# Patient Record
Sex: MALE | Race: BLACK OR AFRICAN AMERICAN | NOT HISPANIC OR LATINO | Employment: OTHER | URBAN - METROPOLITAN AREA
[De-identification: names, ages, dates, MRNs, and addresses within clinical notes are randomized per-mention and may not be internally consistent; named-entity substitution may affect disease eponyms.]

---

## 2014-05-28 LAB — CRC RECOMMENDATION EXT: NORMAL

## 2017-05-18 ENCOUNTER — HISTORICAL (OUTPATIENT)
Dept: INTERNAL MEDICINE | Facility: CLINIC | Age: 52
End: 2017-05-18

## 2017-05-18 LAB
ABS NEUT (OLG): 2.26 X10(3)/MCL (ref 2.1–9.2)
ALBUMIN SERPL-MCNC: 4.2 GM/DL (ref 3.4–5)
ALBUMIN/GLOB SERPL: 1 RATIO (ref 1–2)
ALP SERPL-CCNC: 83 UNIT/L (ref 20–120)
ALT SERPL-CCNC: 30 UNIT/L
APPEARANCE, UA: CLEAR
AST SERPL-CCNC: 20 UNIT/L
BACTERIA #/AREA URNS AUTO: ABNORMAL /[HPF]
BASOPHILS # BLD AUTO: 0.02 X10(3)/MCL
BASOPHILS NFR BLD AUTO: 0 % (ref 0–1)
BILIRUB SERPL-MCNC: 0.6 MG/DL
BILIRUB UR QL STRIP: NEGATIVE
BILIRUBIN DIRECT+TOT PNL SERPL-MCNC: <0.1 MG/DL
BILIRUBIN DIRECT+TOT PNL SERPL-MCNC: >0.5 MG/DL
BUN SERPL-MCNC: 16 MG/DL (ref 7–25)
CALCIUM SERPL-MCNC: 9.1 MG/DL (ref 8.4–10.3)
CHLORIDE SERPL-SCNC: 108 MMOL/L (ref 96–110)
CHOLEST SERPL-MCNC: 166 MG/DL
CHOLEST/HDLC SERPL: 5 {RATIO} (ref 0–5)
CO2 SERPL-SCNC: 27 MMOL/L (ref 24–32)
COLOR UR: ABNORMAL
CREAT SERPL-MCNC: 0.73 MG/DL (ref 0.7–1.4)
EOSINOPHIL # BLD AUTO: 0.04 10*3/UL
EOSINOPHIL NFR BLD AUTO: 1 % (ref 0–5)
ERYTHROCYTE [DISTWIDTH] IN BLOOD BY AUTOMATED COUNT: 14.7 % (ref 11.5–14.5)
EST. AVERAGE GLUCOSE BLD GHB EST-MCNC: 123 MG/DL
GLOBULIN SER-MCNC: 3.1 GM/ML (ref 2.3–3.5)
GLUCOSE (UA): NORMAL
GLUCOSE SERPL-MCNC: 107 MG/DL (ref 65–99)
HAV IGM SERPL QL IA: NONREACTIVE
HBA1C MFR BLD: 5.9 % (ref 4.7–5.6)
HBV CORE IGM SERPL QL IA: NONREACTIVE
HBV SURFACE AG SERPL QL IA: NEGATIVE
HCT VFR BLD AUTO: 41.4 % (ref 40–51)
HCV AB SERPL QL IA: NONREACTIVE
HDLC SERPL-MCNC: 33 MG/DL
HGB BLD-MCNC: 13 GM/DL (ref 13.5–17.5)
HGB UR QL STRIP: 0.06 MG/DL
HIV 1+2 AB+HIV1 P24 AG SERPL QL IA: NONREACTIVE
HYALINE CASTS #/AREA URNS LPF: ABNORMAL /[LPF]
KETONES UR QL STRIP: NEGATIVE
LDLC SERPL CALC-MCNC: 111 MG/DL (ref 0–130)
LEUKOCYTE ESTERASE UR QL STRIP: NEGATIVE
LYMPHOCYTES # BLD AUTO: 2.45 X10(3)/MCL
LYMPHOCYTES NFR BLD AUTO: 48 % (ref 15–40)
MCH RBC QN AUTO: 23.3 PG (ref 26–34)
MCHC RBC AUTO-ENTMCNC: 31.4 GM/DL (ref 31–37)
MCV RBC AUTO: 74.1 FL (ref 80–100)
MONOCYTES # BLD AUTO: 0.38 X10(3)/MCL
MONOCYTES NFR BLD AUTO: 7 % (ref 4–12)
NEUTROPHILS # BLD AUTO: 2.26 X10(3)/MCL
NEUTROPHILS NFR BLD AUTO: 44 X10(3)/MCL
NITRITE UR QL STRIP: NEGATIVE
PH UR STRIP: 5.5 [PH] (ref 4.5–8)
PLATELET # BLD AUTO: 269 X10(3)/MCL (ref 130–400)
PMV BLD AUTO: 10.8 FL (ref 7.4–10.4)
POTASSIUM SERPL-SCNC: 3.8 MMOL/L (ref 3.6–5.2)
PROT SERPL-MCNC: 7.3 GM/DL (ref 6–8)
PROT UR QL STRIP: NEGATIVE
PSA SERPL-MCNC: 1.5 NG/ML (ref 0–4)
RBC # BLD AUTO: 5.59 X10(6)/MCL (ref 4.5–5.9)
RBC #/AREA URNS AUTO: ABNORMAL /[HPF]
SODIUM SERPL-SCNC: 143 MMOL/L (ref 135–146)
SP GR UR STRIP: 1.02 (ref 1–1.03)
SQUAMOUS #/AREA URNS LPF: ABNORMAL /[LPF]
TRIGL SERPL-MCNC: 108 MG/DL
TSH SERPL-ACNC: 2.76 MIU/L (ref 0.5–5)
UROBILINOGEN UR STRIP-ACNC: NORMAL
VLDLC SERPL CALC-MCNC: 22 MG/DL
WBC # SPEC AUTO: 5.2 X10(3)/MCL (ref 4.5–11)
WBC #/AREA URNS AUTO: ABNORMAL /HPF

## 2017-11-06 ENCOUNTER — HISTORICAL (OUTPATIENT)
Dept: INTERNAL MEDICINE | Facility: CLINIC | Age: 52
End: 2017-11-06

## 2017-11-06 LAB
EST. AVERAGE GLUCOSE BLD GHB EST-MCNC: 128 MG/DL
HBA1C MFR BLD: 6.1 % (ref 4.2–6.3)

## 2018-06-18 ENCOUNTER — HISTORICAL (OUTPATIENT)
Dept: RADIOLOGY | Facility: HOSPITAL | Age: 53
End: 2018-06-18

## 2018-06-18 LAB
ABS NEUT (OLG): 2.12 X10(3)/MCL (ref 2.1–9.2)
ALBUMIN SERPL-MCNC: 3.7 GM/DL (ref 3.4–5)
ALBUMIN/GLOB SERPL: 1 RATIO (ref 1–2)
ALP SERPL-CCNC: 87 UNIT/L (ref 45–117)
ALT SERPL-CCNC: 42 UNIT/L (ref 12–78)
APPEARANCE, UA: CLEAR
AST SERPL-CCNC: 21 UNIT/L (ref 15–37)
BACTERIA #/AREA URNS AUTO: ABNORMAL /[HPF]
BASOPHILS # BLD AUTO: 0.02 X10(3)/MCL
BASOPHILS NFR BLD AUTO: 0 %
BILIRUB SERPL-MCNC: 0.3 MG/DL (ref 0.2–1)
BILIRUB UR QL STRIP: NEGATIVE
BILIRUBIN DIRECT+TOT PNL SERPL-MCNC: <0.1 MG/DL
BILIRUBIN DIRECT+TOT PNL SERPL-MCNC: >0.2 MG/DL
BUN SERPL-MCNC: 19 MG/DL (ref 7–18)
CALCIUM SERPL-MCNC: 8.4 MG/DL (ref 8.5–10.1)
CHLORIDE SERPL-SCNC: 110 MMOL/L (ref 98–107)
CHOLEST SERPL-MCNC: 134 MG/DL
CHOLEST/HDLC SERPL: 3.7 {RATIO} (ref 0–5)
CO2 SERPL-SCNC: 26 MMOL/L (ref 21–32)
COLOR UR: ABNORMAL
CREAT SERPL-MCNC: 0.8 MG/DL (ref 0.6–1.3)
EOSINOPHIL # BLD AUTO: 0.05 10*3/UL
EOSINOPHIL NFR BLD AUTO: 1 %
ERYTHROCYTE [DISTWIDTH] IN BLOOD BY AUTOMATED COUNT: 14.8 % (ref 11.5–14.5)
EST. AVERAGE GLUCOSE BLD GHB EST-MCNC: 128 MG/DL
GLOBULIN SER-MCNC: 3.6 GM/ML (ref 2.3–3.5)
GLUCOSE (UA): NORMAL
GLUCOSE SERPL-MCNC: 86 MG/DL (ref 74–106)
HAV IGM SERPL QL IA: NONREACTIVE
HBA1C MFR BLD: 6.1 % (ref 4.2–6.3)
HBV CORE IGM SERPL QL IA: NONREACTIVE
HBV SURFACE AG SERPL QL IA: NEGATIVE
HCT VFR BLD AUTO: 39.2 % (ref 40–51)
HCV AB SERPL QL IA: NONREACTIVE
HDLC SERPL-MCNC: 36 MG/DL
HGB BLD-MCNC: 12.2 GM/DL (ref 13.5–17.5)
HGB UR QL STRIP: 0.03 MG/DL
HIV 1+2 AB+HIV1 P24 AG SERPL QL IA: NONREACTIVE
HYALINE CASTS #/AREA URNS LPF: ABNORMAL /[LPF]
IMM GRANULOCYTES # BLD AUTO: 0.01 10*3/UL
IMM GRANULOCYTES NFR BLD AUTO: 0 %
KETONES UR QL STRIP: NEGATIVE
LDLC SERPL CALC-MCNC: 81 MG/DL (ref 0–130)
LEUKOCYTE ESTERASE UR QL STRIP: NEGATIVE
LYMPHOCYTES # BLD AUTO: 2.24 X10(3)/MCL
LYMPHOCYTES NFR BLD AUTO: 46 % (ref 13–40)
MCH RBC QN AUTO: 23.8 PG (ref 26–34)
MCHC RBC AUTO-ENTMCNC: 31.1 GM/DL (ref 31–37)
MCV RBC AUTO: 76.6 FL (ref 80–100)
MONOCYTES # BLD AUTO: 0.42 X10(3)/MCL
MONOCYTES NFR BLD AUTO: 9 % (ref 4–12)
NEUTROPHILS # BLD AUTO: 2.12 X10(3)/MCL
NEUTROPHILS NFR BLD AUTO: 44 X10(3)/MCL
NITRITE UR QL STRIP: NEGATIVE
PH UR STRIP: 6.5 [PH] (ref 4.5–8)
PLATELET # BLD AUTO: 237 X10(3)/MCL (ref 130–400)
PMV BLD AUTO: 10.4 FL (ref 7.4–10.4)
POTASSIUM SERPL-SCNC: 4.2 MMOL/L (ref 3.5–5.1)
PROT SERPL-MCNC: 7.3 GM/DL (ref 6.4–8.2)
PROT UR QL STRIP: NEGATIVE
PSA SERPL-MCNC: 1.8 NG/ML
RBC # BLD AUTO: 5.12 X10(6)/MCL (ref 4.5–5.9)
RBC #/AREA URNS AUTO: ABNORMAL /[HPF]
SODIUM SERPL-SCNC: 144 MMOL/L (ref 136–145)
SP GR UR STRIP: 1.02 (ref 1–1.03)
SQUAMOUS #/AREA URNS LPF: ABNORMAL /[LPF]
TRIGL SERPL-MCNC: 84 MG/DL
TSH SERPL-ACNC: 1.03 MIU/L (ref 0.36–3.74)
UROBILINOGEN UR STRIP-ACNC: NORMAL
VLDLC SERPL CALC-MCNC: 17 MG/DL
WBC # SPEC AUTO: 4.9 X10(3)/MCL (ref 4.5–11)
WBC #/AREA URNS AUTO: ABNORMAL /HPF

## 2018-06-20 LAB — FINAL CULTURE: NO GROWTH

## 2018-09-14 ENCOUNTER — HISTORICAL (OUTPATIENT)
Dept: INTERNAL MEDICINE | Facility: CLINIC | Age: 53
End: 2018-09-14

## 2018-09-14 LAB
ABS NEUT (OLG): 2.58 X10(3)/MCL (ref 2.1–9.2)
BASOPHILS # BLD AUTO: 0.02 X10(3)/MCL
BASOPHILS NFR BLD AUTO: 0 %
EOSINOPHIL # BLD AUTO: 0.04 X10(3)/MCL
EOSINOPHIL NFR BLD AUTO: 1 %
ERYTHROCYTE [DISTWIDTH] IN BLOOD BY AUTOMATED COUNT: 14.9 % (ref 11.5–14.5)
HCT VFR BLD AUTO: 40.4 % (ref 40–51)
HGB BLD-MCNC: 12.6 GM/DL (ref 13.5–17.5)
IMM GRANULOCYTES # BLD AUTO: 0.01 10*3/UL
IMM GRANULOCYTES NFR BLD AUTO: 0 %
LYMPHOCYTES # BLD AUTO: 2.55 X10(3)/MCL
LYMPHOCYTES NFR BLD AUTO: 45 % (ref 13–40)
MCH RBC QN AUTO: 23.5 PG (ref 26–34)
MCHC RBC AUTO-ENTMCNC: 31.2 GM/DL (ref 31–37)
MCV RBC AUTO: 75.2 FL (ref 80–100)
MONOCYTES # BLD AUTO: 0.44 X10(3)/MCL
MONOCYTES NFR BLD AUTO: 8 % (ref 4–12)
NEUTROPHILS # BLD AUTO: 2.58 X10(3)/MCL
NEUTROPHILS NFR BLD AUTO: 46 X10(3)/MCL
PLATELET # BLD AUTO: 246 X10(3)/MCL (ref 130–400)
PMV BLD AUTO: 10.5 FL (ref 7.4–10.4)
RBC # BLD AUTO: 5.37 X10(6)/MCL (ref 4.5–5.9)
WBC # SPEC AUTO: 5.6 X10(3)/MCL (ref 4.5–11)

## 2019-01-21 ENCOUNTER — HISTORICAL (OUTPATIENT)
Dept: INTERNAL MEDICINE | Facility: CLINIC | Age: 54
End: 2019-01-21

## 2019-01-21 LAB
ABS NEUT (OLG): 1.98 X10(3)/MCL (ref 2.1–9.2)
APPEARANCE, UA: CLEAR
BACTERIA #/AREA URNS AUTO: ABNORMAL /[HPF]
BASOPHILS # BLD AUTO: 0.02 X10(3)/MCL
BASOPHILS NFR BLD AUTO: 0 %
BILIRUB UR QL STRIP: NEGATIVE
COLOR UR: ABNORMAL
EOSINOPHIL # BLD AUTO: 0.05 10*3/UL
EOSINOPHIL NFR BLD AUTO: 1 %
ERYTHROCYTE [DISTWIDTH] IN BLOOD BY AUTOMATED COUNT: 14.6 % (ref 11.5–14.5)
EST. AVERAGE GLUCOSE BLD GHB EST-MCNC: 128 MG/DL
GLUCOSE (UA): NORMAL
HBA1C MFR BLD: 6.1 % (ref 4.2–6.3)
HCT VFR BLD AUTO: 41.7 % (ref 40–51)
HGB BLD-MCNC: 12.8 GM/DL (ref 13.5–17.5)
HGB UR QL STRIP: 0.06 MG/DL
HYALINE CASTS #/AREA URNS LPF: ABNORMAL /[LPF]
IMM GRANULOCYTES # BLD AUTO: 0.01 10*3/UL
IMM GRANULOCYTES NFR BLD AUTO: 0 %
KETONES UR QL STRIP: NEGATIVE
LEUKOCYTE ESTERASE UR QL STRIP: NEGATIVE
LYMPHOCYTES # BLD AUTO: 2.44 X10(3)/MCL
LYMPHOCYTES NFR BLD AUTO: 49 % (ref 13–40)
MCH RBC QN AUTO: 23.3 PG (ref 26–34)
MCHC RBC AUTO-ENTMCNC: 30.7 GM/DL (ref 31–37)
MCV RBC AUTO: 76 FL (ref 80–100)
MONOCYTES # BLD AUTO: 0.48 X10(3)/MCL
MONOCYTES NFR BLD AUTO: 10 % (ref 4–12)
NEUTROPHILS # BLD AUTO: 1.98 X10(3)/MCL
NEUTROPHILS NFR BLD AUTO: 40 X10(3)/MCL
NITRITE UR QL STRIP: NEGATIVE
PH UR STRIP: 5.5 [PH] (ref 4.5–8)
PLATELET # BLD AUTO: 238 X10(3)/MCL (ref 130–400)
PMV BLD AUTO: 10.5 FL (ref 7.4–10.4)
PROT UR QL STRIP: NEGATIVE
RBC # BLD AUTO: 5.49 X10(6)/MCL (ref 4.5–5.9)
RBC #/AREA URNS AUTO: ABNORMAL /[HPF]
SP GR UR STRIP: 1.02 (ref 1–1.03)
SQUAMOUS #/AREA URNS LPF: ABNORMAL /[LPF]
UROBILINOGEN UR STRIP-ACNC: NORMAL
WBC # SPEC AUTO: 5 X10(3)/MCL (ref 4.5–11)
WBC #/AREA URNS AUTO: ABNORMAL /HPF

## 2019-07-17 ENCOUNTER — HISTORICAL (OUTPATIENT)
Dept: INTERNAL MEDICINE | Facility: CLINIC | Age: 54
End: 2019-07-17

## 2019-07-17 LAB
ABS NEUT (OLG): 2.67 X10(3)/MCL (ref 2.1–9.2)
ALBUMIN SERPL-MCNC: 3.7 GM/DL (ref 3.4–5)
ALBUMIN/GLOB SERPL: 1.1 RATIO (ref 1.1–2)
ALP SERPL-CCNC: 88 UNIT/L (ref 45–117)
ALT SERPL-CCNC: 39 UNIT/L (ref 12–78)
AST SERPL-CCNC: 16 UNIT/L (ref 15–37)
BASOPHILS # BLD AUTO: 0.02 X10(3)/MCL
BASOPHILS NFR BLD AUTO: 0 %
BILIRUB SERPL-MCNC: 0.3 MG/DL (ref 0.2–1)
BILIRUBIN DIRECT+TOT PNL SERPL-MCNC: <0.1 MG/DL
BILIRUBIN DIRECT+TOT PNL SERPL-MCNC: ABNORMAL MG/DL
BUN SERPL-MCNC: 19 MG/DL (ref 7–18)
CALCIUM SERPL-MCNC: 8.7 MG/DL (ref 8.5–10.1)
CHLORIDE SERPL-SCNC: 110 MMOL/L (ref 98–107)
CHOLEST SERPL-MCNC: 145 MG/DL
CHOLEST/HDLC SERPL: 3.6 {RATIO} (ref 0–5)
CO2 SERPL-SCNC: 27 MMOL/L (ref 21–32)
CREAT SERPL-MCNC: 0.8 MG/DL (ref 0.6–1.3)
EOSINOPHIL # BLD AUTO: 0.05 10*3/UL
EOSINOPHIL NFR BLD AUTO: 1 %
ERYTHROCYTE [DISTWIDTH] IN BLOOD BY AUTOMATED COUNT: 14.9 % (ref 11.5–14.5)
EST. AVERAGE GLUCOSE BLD GHB EST-MCNC: 131 MG/DL
GLOBULIN SER-MCNC: 3.5 GM/ML (ref 2.3–3.5)
GLUCOSE SERPL-MCNC: 93 MG/DL (ref 74–106)
HBA1C MFR BLD: 6.2 % (ref 4.2–6.3)
HCT VFR BLD AUTO: 40.5 % (ref 40–51)
HDLC SERPL-MCNC: 40 MG/DL
HGB BLD-MCNC: 12.7 GM/DL (ref 13.5–17.5)
IMM GRANULOCYTES # BLD AUTO: 0.01 10*3/UL
IMM GRANULOCYTES NFR BLD AUTO: 0 %
LDLC SERPL CALC-MCNC: 89 MG/DL (ref 0–130)
LYMPHOCYTES # BLD AUTO: 2.38 X10(3)/MCL
LYMPHOCYTES NFR BLD AUTO: 43 % (ref 13–40)
MCH RBC QN AUTO: 23.7 PG (ref 26–34)
MCHC RBC AUTO-ENTMCNC: 31.4 GM/DL (ref 31–37)
MCV RBC AUTO: 75.6 FL (ref 80–100)
MONOCYTES # BLD AUTO: 0.44 X10(3)/MCL
MONOCYTES NFR BLD AUTO: 8 % (ref 4–12)
NEUTROPHILS # BLD AUTO: 2.67 X10(3)/MCL
NEUTROPHILS NFR BLD AUTO: 48 X10(3)/MCL
PLATELET # BLD AUTO: 241 X10(3)/MCL (ref 130–400)
PMV BLD AUTO: 10.6 FL (ref 7.4–10.4)
POTASSIUM SERPL-SCNC: 3.6 MMOL/L (ref 3.5–5.1)
PROT SERPL-MCNC: 7.2 GM/DL (ref 6.4–8.2)
PSA SERPL-MCNC: 2.3 NG/ML
RBC # BLD AUTO: 5.36 X10(6)/MCL (ref 4.5–5.9)
SODIUM SERPL-SCNC: 142 MMOL/L (ref 136–145)
TRIGL SERPL-MCNC: 80 MG/DL
TSH SERPL-ACNC: 2.77 MIU/L (ref 0.36–3.74)
VLDLC SERPL CALC-MCNC: 16 MG/DL
WBC # SPEC AUTO: 5.6 X10(3)/MCL (ref 4.5–11)

## 2020-01-14 ENCOUNTER — HISTORICAL (OUTPATIENT)
Dept: INTERNAL MEDICINE | Facility: CLINIC | Age: 55
End: 2020-01-14

## 2020-01-14 LAB
ABS NEUT (OLG): 1.91 X10(3)/MCL (ref 2.1–9.2)
BASOPHILS # BLD AUTO: 0 X10(3)/MCL (ref 0–0.2)
BASOPHILS NFR BLD AUTO: 0 %
EOSINOPHIL # BLD AUTO: 0 X10(3)/MCL (ref 0–0.9)
EOSINOPHIL NFR BLD AUTO: 1 %
ERYTHROCYTE [DISTWIDTH] IN BLOOD BY AUTOMATED COUNT: 15.1 % (ref 11.5–14.5)
EST. AVERAGE GLUCOSE BLD GHB EST-MCNC: 131 MG/DL
FERRITIN SERPL-MCNC: 146.9 NG/ML (ref 26–388)
FOLATE SERPL-MCNC: 9.4 NG/ML (ref 3.1–17.5)
HBA1C MFR BLD: 6.2 % (ref 4.2–6.3)
HCT VFR BLD AUTO: 40 % (ref 40–51)
HGB BLD-MCNC: 12 GM/DL (ref 13.5–17.5)
IRON SATN MFR SERPL: 30.4 % (ref 15–50)
IRON SERPL-MCNC: 77 MCG/DL (ref 65–175)
LYMPHOCYTES # BLD AUTO: 2.1 X10(3)/MCL (ref 0.6–4.6)
LYMPHOCYTES NFR BLD AUTO: 48 %
MCH RBC QN AUTO: 23.1 PG (ref 26–34)
MCHC RBC AUTO-ENTMCNC: 30 GM/DL (ref 31–37)
MCV RBC AUTO: 76.9 FL (ref 80–100)
MONOCYTES # BLD AUTO: 0.4 X10(3)/MCL (ref 0.1–1.3)
MONOCYTES NFR BLD AUTO: 8 %
NEUTROPHILS # BLD AUTO: 1.91 X10(3)/MCL (ref 2.1–9.2)
NEUTROPHILS NFR BLD AUTO: 43 %
PLATELET # BLD AUTO: 245 X10(3)/MCL (ref 130–400)
PMV BLD AUTO: 10.5 FL (ref 7.4–10.4)
RBC # BLD AUTO: 5.2 X10(6)/MCL (ref 4.5–5.9)
TIBC SERPL-MCNC: 253 MCG/DL (ref 250–450)
TRANSFERRIN SERPL-MCNC: 209 MG/DL (ref 200–360)
WBC # SPEC AUTO: 4.4 X10(3)/MCL (ref 4.5–11)

## 2020-07-15 ENCOUNTER — HISTORICAL (OUTPATIENT)
Dept: INTERNAL MEDICINE | Facility: CLINIC | Age: 55
End: 2020-07-15

## 2020-07-15 LAB
ABS NEUT (OLG): 2.3 X10(3)/MCL (ref 2.1–9.2)
ALBUMIN SERPL-MCNC: 3.6 GM/DL (ref 3.4–5)
ALBUMIN/GLOB SERPL: 1 RATIO (ref 1.1–2)
ALP SERPL-CCNC: 89 UNIT/L (ref 45–117)
ALT SERPL-CCNC: 51 UNIT/L (ref 12–78)
APPEARANCE, UA: CLEAR
AST SERPL-CCNC: 21 UNIT/L (ref 15–37)
BACTERIA #/AREA URNS AUTO: ABNORMAL /HPF
BASOPHILS # BLD AUTO: 0 X10(3)/MCL (ref 0–0.2)
BASOPHILS NFR BLD AUTO: 0 %
BILIRUB SERPL-MCNC: 0.2 MG/DL (ref 0.2–1)
BILIRUB UR QL STRIP: NEGATIVE
BILIRUBIN DIRECT+TOT PNL SERPL-MCNC: <0.1 MG/DL (ref 0–0.2)
BILIRUBIN DIRECT+TOT PNL SERPL-MCNC: ABNORMAL MG/DL
BUN SERPL-MCNC: 21 MG/DL (ref 7–18)
CALCIUM SERPL-MCNC: 8.6 MG/DL (ref 8.5–10.1)
CHLORIDE SERPL-SCNC: 111 MMOL/L (ref 98–107)
CHOLEST SERPL-MCNC: 140 MG/DL
CHOLEST/HDLC SERPL: 3.8 {RATIO} (ref 0–5)
CO2 SERPL-SCNC: 27 MMOL/L (ref 21–32)
COLOR UR: ABNORMAL
CREAT SERPL-MCNC: 0.8 MG/DL (ref 0.6–1.3)
EOSINOPHIL # BLD AUTO: 0 X10(3)/MCL (ref 0–0.9)
EOSINOPHIL NFR BLD AUTO: 1 %
ERYTHROCYTE [DISTWIDTH] IN BLOOD BY AUTOMATED COUNT: 14.9 % (ref 11.5–14.5)
EST. AVERAGE GLUCOSE BLD GHB EST-MCNC: 126 MG/DL
GLOBULIN SER-MCNC: 3.7 GM/ML (ref 2.3–3.5)
GLUCOSE (UA): NEGATIVE
GLUCOSE SERPL-MCNC: 99 MG/DL (ref 74–106)
HBA1C MFR BLD: 6 % (ref 4.2–6.3)
HCT VFR BLD AUTO: 39.3 % (ref 40–51)
HDLC SERPL-MCNC: 37 MG/DL (ref 40–59)
HGB BLD-MCNC: 12 GM/DL (ref 13.5–17.5)
HGB UR QL STRIP: 0.06 MG/DL
HYALINE CASTS #/AREA URNS LPF: ABNORMAL /LPF
KETONES UR QL STRIP: NEGATIVE
LDLC SERPL CALC-MCNC: 89 MG/DL
LEUKOCYTE ESTERASE UR QL STRIP: NEGATIVE
LYMPHOCYTES # BLD AUTO: 2 X10(3)/MCL (ref 0.6–4.6)
LYMPHOCYTES NFR BLD AUTO: 42 %
MCH RBC QN AUTO: 23.4 PG (ref 26–34)
MCHC RBC AUTO-ENTMCNC: 30.5 GM/DL (ref 31–37)
MCV RBC AUTO: 76.6 FL (ref 80–100)
MONOCYTES # BLD AUTO: 0.4 X10(3)/MCL (ref 0.1–1.3)
MONOCYTES NFR BLD AUTO: 9 %
NEUTROPHILS # BLD AUTO: 2.3 X10(3)/MCL (ref 2.1–9.2)
NEUTROPHILS NFR BLD AUTO: 48 %
NITRITE UR QL STRIP: NEGATIVE
PH UR STRIP: 5.5 [PH] (ref 4.5–8)
PLATELET # BLD AUTO: 227 X10(3)/MCL (ref 130–400)
PMV BLD AUTO: 10.5 FL (ref 7.4–10.4)
POTASSIUM SERPL-SCNC: 3.9 MMOL/L (ref 3.5–5.1)
PROT SERPL-MCNC: 7.3 GM/DL (ref 6.4–8.2)
PROT UR QL STRIP: NEGATIVE
PSA SERPL-MCNC: 2.9 NG/ML
RBC # BLD AUTO: 5.13 X10(6)/MCL (ref 4.5–5.9)
RBC #/AREA URNS AUTO: ABNORMAL /HPF
SODIUM SERPL-SCNC: 141 MMOL/L (ref 136–145)
SP GR UR STRIP: 1.02 (ref 1–1.03)
SQUAMOUS #/AREA URNS LPF: ABNORMAL /LPF
TRIGL SERPL-MCNC: 71 MG/DL
TSH SERPL-ACNC: 2.1 MIU/L (ref 0.36–3.74)
UROBILINOGEN UR STRIP-ACNC: NORMAL
VLDLC SERPL CALC-MCNC: 14 MG/DL
WBC # SPEC AUTO: 4.8 X10(3)/MCL (ref 4.5–11)
WBC #/AREA URNS AUTO: ABNORMAL /HPF

## 2020-08-05 ENCOUNTER — HISTORICAL (OUTPATIENT)
Dept: RADIOLOGY | Facility: HOSPITAL | Age: 55
End: 2020-08-05

## 2020-10-13 ENCOUNTER — HISTORICAL (OUTPATIENT)
Dept: INTERNAL MEDICINE | Facility: CLINIC | Age: 55
End: 2020-10-13

## 2020-10-13 LAB
ABS NEUT (OLG): 2.91 X10(3)/MCL (ref 2.1–9.2)
BASOPHILS # BLD AUTO: 0 X10(3)/MCL (ref 0–0.2)
BASOPHILS NFR BLD AUTO: 0 %
BUN SERPL-MCNC: 17 MG/DL (ref 7–18)
CALCIUM SERPL-MCNC: 8.9 MG/DL (ref 8.5–10.1)
CHLORIDE SERPL-SCNC: 111 MMOL/L (ref 98–107)
CO2 SERPL-SCNC: 27 MMOL/L (ref 21–32)
CREAT SERPL-MCNC: 0.8 MG/DL (ref 0.6–1.3)
CREAT/UREA NIT SERPL: 21 MG/DL (ref 12–14)
EOSINOPHIL # BLD AUTO: 0 X10(3)/MCL (ref 0–0.9)
EOSINOPHIL NFR BLD AUTO: 1 %
ERYTHROCYTE [DISTWIDTH] IN BLOOD BY AUTOMATED COUNT: 15.3 % (ref 11.5–14.5)
EST. AVERAGE GLUCOSE BLD GHB EST-MCNC: 137 MG/DL
GLUCOSE SERPL-MCNC: 100 MG/DL (ref 74–106)
HBA1C MFR BLD: 6.4 % (ref 4.2–6.3)
HCT VFR BLD AUTO: 40.2 % (ref 40–51)
HGB BLD-MCNC: 12.3 GM/DL (ref 13.5–17.5)
IMM GRANULOCYTES # BLD AUTO: 0.01 10*3/UL
IMM GRANULOCYTES NFR BLD AUTO: 0 %
LYMPHOCYTES # BLD AUTO: 2.1 X10(3)/MCL (ref 0.6–4.6)
LYMPHOCYTES NFR BLD AUTO: 38 %
MCH RBC QN AUTO: 23.3 PG (ref 26–34)
MCHC RBC AUTO-ENTMCNC: 30.6 GM/DL (ref 31–37)
MCV RBC AUTO: 76 FL (ref 80–100)
MONOCYTES # BLD AUTO: 0.4 X10(3)/MCL (ref 0.1–1.3)
MONOCYTES NFR BLD AUTO: 8 %
NEUTROPHILS # BLD AUTO: 2.91 X10(3)/MCL (ref 2.1–9.2)
NEUTROPHILS NFR BLD AUTO: 53 %
PLATELET # BLD AUTO: 228 X10(3)/MCL (ref 130–400)
PMV BLD AUTO: 11.2 FL (ref 7.4–10.4)
POTASSIUM SERPL-SCNC: 3.9 MMOL/L (ref 3.5–5.1)
RBC # BLD AUTO: 5.29 X10(6)/MCL (ref 4.5–5.9)
SODIUM SERPL-SCNC: 142 MMOL/L (ref 136–145)
WBC # SPEC AUTO: 5.5 X10(3)/MCL (ref 4.5–11)

## 2021-01-18 ENCOUNTER — HISTORICAL (OUTPATIENT)
Dept: INTERNAL MEDICINE | Facility: CLINIC | Age: 56
End: 2021-01-18

## 2021-01-18 LAB
ABS NEUT (OLG): 2.16 X10(3)/MCL (ref 2.1–9.2)
APPEARANCE, UA: CLEAR
BACTERIA #/AREA URNS AUTO: ABNORMAL /HPF
BASOPHILS # BLD AUTO: 0 X10(3)/MCL (ref 0–0.2)
BASOPHILS NFR BLD AUTO: 0 %
BILIRUB UR QL STRIP: NEGATIVE
COLOR UR: ABNORMAL
EOSINOPHIL # BLD AUTO: 0 X10(3)/MCL (ref 0–0.9)
EOSINOPHIL NFR BLD AUTO: 1 %
ERYTHROCYTE [DISTWIDTH] IN BLOOD BY AUTOMATED COUNT: 14.9 % (ref 11.5–14.5)
EST. AVERAGE GLUCOSE BLD GHB EST-MCNC: 125.5 MG/DL
GLUCOSE (UA): NEGATIVE
HBA1C MFR BLD: 6 %
HCT VFR BLD AUTO: 39.5 % (ref 40–51)
HGB BLD-MCNC: 12.1 GM/DL (ref 13.5–17.5)
HGB UR QL STRIP: 0.06 MG/DL
HYALINE CASTS #/AREA URNS LPF: ABNORMAL /LPF
IMM GRANULOCYTES # BLD AUTO: 0.01 10*3/UL
IMM GRANULOCYTES NFR BLD AUTO: 0 %
KETONES UR QL STRIP: ABNORMAL
LEUKOCYTE ESTERASE UR QL STRIP: NEGATIVE
LYMPHOCYTES # BLD AUTO: 2 X10(3)/MCL (ref 0.6–4.6)
LYMPHOCYTES NFR BLD AUTO: 44 %
MCH RBC QN AUTO: 23.4 PG (ref 26–34)
MCHC RBC AUTO-ENTMCNC: 30.6 GM/DL (ref 31–37)
MCV RBC AUTO: 76.4 FL (ref 80–100)
MONOCYTES # BLD AUTO: 0.4 X10(3)/MCL (ref 0.1–1.3)
MONOCYTES NFR BLD AUTO: 8 %
NEUTROPHILS # BLD AUTO: 2.16 X10(3)/MCL (ref 2.1–9.2)
NEUTROPHILS NFR BLD AUTO: 47 %
NITRITE UR QL STRIP: NEGATIVE
PH UR STRIP: 5.5 [PH] (ref 4.5–8)
PLATELET # BLD AUTO: 248 X10(3)/MCL (ref 130–400)
PMV BLD AUTO: 10.7 FL (ref 7.4–10.4)
PROT UR QL STRIP: NEGATIVE
RBC # BLD AUTO: 5.17 X10(6)/MCL (ref 4.5–5.9)
RBC #/AREA URNS AUTO: ABNORMAL /HPF
SP GR UR STRIP: 1.03 (ref 1–1.03)
SQUAMOUS #/AREA URNS LPF: ABNORMAL /LPF
UROBILINOGEN UR STRIP-ACNC: NORMAL
WBC # SPEC AUTO: 4.6 X10(3)/MCL (ref 4.5–11)
WBC #/AREA URNS AUTO: ABNORMAL /HPF

## 2021-04-19 ENCOUNTER — HISTORICAL (OUTPATIENT)
Dept: INTERNAL MEDICINE | Facility: CLINIC | Age: 56
End: 2021-04-19

## 2021-04-19 LAB
ABS NEUT (OLG): 2.1 X10(3)/MCL (ref 2.1–9.2)
ALBUMIN SERPL-MCNC: 4.1 GM/DL (ref 3.5–5)
ALBUMIN/GLOB SERPL: 1.2 RATIO (ref 1.1–2)
ALP SERPL-CCNC: 103 UNIT/L (ref 40–150)
ALT SERPL-CCNC: 23 UNIT/L (ref 0–55)
AST SERPL-CCNC: 15 UNIT/L (ref 5–34)
BASOPHILS # BLD AUTO: 0 X10(3)/MCL (ref 0–0.2)
BASOPHILS NFR BLD AUTO: 0 %
BILIRUB SERPL-MCNC: 0.5 MG/DL
BILIRUBIN DIRECT+TOT PNL SERPL-MCNC: 0.2 MG/DL (ref 0–0.5)
BILIRUBIN DIRECT+TOT PNL SERPL-MCNC: 0.3 MG/DL (ref 0–0.8)
BUN SERPL-MCNC: 20.2 MG/DL (ref 8.4–25.7)
CALCIUM SERPL-MCNC: 9.5 MG/DL (ref 8.4–10.2)
CHLORIDE SERPL-SCNC: 106 MMOL/L (ref 98–107)
CHOLEST SERPL-MCNC: 164 MG/DL
CHOLEST/HDLC SERPL: 4 {RATIO} (ref 0–5)
CO2 SERPL-SCNC: 28 MMOL/L (ref 22–29)
CREAT SERPL-MCNC: 0.76 MG/DL (ref 0.73–1.18)
EOSINOPHIL # BLD AUTO: 0.1 X10(3)/MCL (ref 0–0.9)
EOSINOPHIL NFR BLD AUTO: 1 %
ERYTHROCYTE [DISTWIDTH] IN BLOOD BY AUTOMATED COUNT: 14.7 % (ref 11.5–14.5)
EST. AVERAGE GLUCOSE BLD GHB EST-MCNC: 119.8 MG/DL
GLOBULIN SER-MCNC: 3.5 GM/DL (ref 2.4–3.5)
GLUCOSE SERPL-MCNC: 99 MG/DL (ref 74–100)
HBA1C MFR BLD: 5.8 %
HCT VFR BLD AUTO: 40.6 % (ref 40–51)
HDLC SERPL-MCNC: 38 MG/DL (ref 35–60)
HGB BLD-MCNC: 12.5 GM/DL (ref 13.5–17.5)
IMM GRANULOCYTES # BLD AUTO: 0.01 10*3/UL
IMM GRANULOCYTES NFR BLD AUTO: 0 %
LDLC SERPL CALC-MCNC: 111 MG/DL (ref 50–140)
LYMPHOCYTES # BLD AUTO: 2.5 X10(3)/MCL (ref 0.6–4.6)
LYMPHOCYTES NFR BLD AUTO: 49 %
MCH RBC QN AUTO: 23.4 PG (ref 26–34)
MCHC RBC AUTO-ENTMCNC: 30.8 GM/DL (ref 31–37)
MCV RBC AUTO: 75.9 FL (ref 80–100)
MONOCYTES # BLD AUTO: 0.5 X10(3)/MCL (ref 0.1–1.3)
MONOCYTES NFR BLD AUTO: 9 %
NEUTROPHILS # BLD AUTO: 2.1 X10(3)/MCL (ref 2.1–9.2)
NEUTROPHILS NFR BLD AUTO: 40 %
PLATELET # BLD AUTO: 244 X10(3)/MCL (ref 130–400)
PMV BLD AUTO: 10.8 FL (ref 7.4–10.4)
POTASSIUM SERPL-SCNC: 3.6 MMOL/L (ref 3.5–5.1)
PROT SERPL-MCNC: 7.6 GM/DL (ref 6.4–8.3)
PSA SERPL-MCNC: 2.54 NG/ML
RBC # BLD AUTO: 5.35 X10(6)/MCL (ref 4.5–5.9)
SODIUM SERPL-SCNC: 140 MMOL/L (ref 136–145)
TRIGL SERPL-MCNC: 74 MG/DL (ref 34–140)
TSH SERPL-ACNC: 3.48 UIU/ML (ref 0.35–4.94)
VLDLC SERPL CALC-MCNC: 15 MG/DL
WBC # SPEC AUTO: 5.2 X10(3)/MCL (ref 4.5–11)

## 2021-10-14 ENCOUNTER — HISTORICAL (OUTPATIENT)
Dept: INTERNAL MEDICINE | Facility: CLINIC | Age: 56
End: 2021-10-14

## 2021-10-14 LAB
ABS NEUT (OLG): 2.19 X10(3)/MCL (ref 2.1–9.2)
BASOPHILS # BLD AUTO: 0 X10(3)/MCL (ref 0–0.2)
BASOPHILS NFR BLD AUTO: 0 %
CREAT UR-MCNC: 215.8 MG/DL (ref 58–161)
EOSINOPHIL # BLD AUTO: 0 X10(3)/MCL (ref 0–0.9)
EOSINOPHIL NFR BLD AUTO: 1 %
ERYTHROCYTE [DISTWIDTH] IN BLOOD BY AUTOMATED COUNT: 14.6 % (ref 11.5–14.5)
EST. AVERAGE GLUCOSE BLD GHB EST-MCNC: 122.6 MG/DL
HBA1C MFR BLD: 5.9 %
HCT VFR BLD AUTO: 38.8 % (ref 40–51)
HGB BLD-MCNC: 12.1 GM/DL (ref 13.5–17.5)
LYMPHOCYTES # BLD AUTO: 2.6 X10(3)/MCL (ref 0.6–4.6)
LYMPHOCYTES NFR BLD AUTO: 49 %
MCH RBC QN AUTO: 23.6 PG (ref 26–34)
MCHC RBC AUTO-ENTMCNC: 31.2 GM/DL (ref 31–37)
MCV RBC AUTO: 75.8 FL (ref 80–100)
MICROALBUMIN UR-MCNC: 11.6 MG/L
MICROALBUMIN/CREAT RATIO PNL UR: 5.4 MG/GM CR (ref 0–30)
MONOCYTES # BLD AUTO: 0.5 X10(3)/MCL (ref 0.1–1.3)
MONOCYTES NFR BLD AUTO: 9 %
NEUTROPHILS # BLD AUTO: 2.19 X10(3)/MCL (ref 2.1–9.2)
NEUTROPHILS NFR BLD AUTO: 41 %
NRBC BLD AUTO-RTO: 0 % (ref 0–0.2)
PLATELET # BLD AUTO: 235 X10(3)/MCL (ref 130–400)
PMV BLD AUTO: 10 FL (ref 7.4–10.4)
RBC # BLD AUTO: 5.12 X10(6)/MCL (ref 4.5–5.9)
WBC # SPEC AUTO: 5.3 X10(3)/MCL (ref 4.5–11)

## 2022-01-25 ENCOUNTER — HISTORICAL (OUTPATIENT)
Dept: INTERNAL MEDICINE | Facility: CLINIC | Age: 57
End: 2022-01-25

## 2022-01-25 LAB
ABS NEUT (OLG): 1.73 X10(3)/MCL (ref 2.1–9.2)
BASOPHILS # BLD AUTO: 0 X10(3)/MCL (ref 0–0.2)
BASOPHILS NFR BLD AUTO: 0 %
BUN SERPL-MCNC: 16.2 MG/DL (ref 8.4–25.7)
CALCIUM SERPL-MCNC: 8.9 MG/DL (ref 8.7–10.5)
CHLORIDE SERPL-SCNC: 113 MMOL/L (ref 98–107)
CO2 SERPL-SCNC: 26 MMOL/L (ref 22–29)
CREAT SERPL-MCNC: 0.78 MG/DL (ref 0.73–1.18)
CREAT/UREA NIT SERPL: 21
EOSINOPHIL # BLD AUTO: 0 X10(3)/MCL (ref 0–0.9)
EOSINOPHIL NFR BLD AUTO: 1 %
ERYTHROCYTE [DISTWIDTH] IN BLOOD BY AUTOMATED COUNT: 15.1 % (ref 11.5–14.5)
GLUCOSE SERPL-MCNC: 99 MG/DL (ref 74–100)
HCT VFR BLD AUTO: 39.1 % (ref 40–51)
HGB BLD-MCNC: 11.9 GM/DL (ref 13.5–17.5)
IMM GRANULOCYTES # BLD AUTO: 0.01 10*3/UL
IMM GRANULOCYTES NFR BLD AUTO: 0 %
LYMPHOCYTES # BLD AUTO: 2.1 X10(3)/MCL (ref 0.6–4.6)
LYMPHOCYTES NFR BLD AUTO: 49 %
MCH RBC QN AUTO: 23.3 PG (ref 26–34)
MCHC RBC AUTO-ENTMCNC: 30.4 GM/DL (ref 31–37)
MCV RBC AUTO: 76.7 FL (ref 80–100)
MONOCYTES # BLD AUTO: 0.4 X10(3)/MCL (ref 0.1–1.3)
MONOCYTES NFR BLD AUTO: 9 %
NEUTROPHILS # BLD AUTO: 1.73 X10(3)/MCL (ref 2.1–9.2)
NEUTROPHILS NFR BLD AUTO: 40 %
NRBC BLD AUTO-RTO: 0 % (ref 0–0.2)
PLATELET # BLD AUTO: 235 X10(3)/MCL (ref 130–400)
PMV BLD AUTO: 10.5 FL (ref 7.4–10.4)
POTASSIUM SERPL-SCNC: 4 MMOL/L (ref 3.5–5.1)
RBC # BLD AUTO: 5.1 X10(6)/MCL (ref 4.5–5.9)
SODIUM SERPL-SCNC: 142 MMOL/L (ref 136–145)
WBC # SPEC AUTO: 4.3 X10(3)/MCL (ref 4.5–11)

## 2022-03-31 ENCOUNTER — HISTORICAL (OUTPATIENT)
Dept: ADMINISTRATIVE | Facility: HOSPITAL | Age: 57
End: 2022-03-31

## 2022-04-09 ENCOUNTER — HISTORICAL (OUTPATIENT)
Dept: ADMINISTRATIVE | Facility: HOSPITAL | Age: 57
End: 2022-04-09
Payer: MEDICARE

## 2022-04-20 ENCOUNTER — HISTORICAL (OUTPATIENT)
Dept: INTERNAL MEDICINE | Facility: CLINIC | Age: 57
End: 2022-04-20
Payer: MEDICARE

## 2022-04-20 LAB
ABS NEUT (OLG): 2.1 (ref 2.1–9.2)
BASOPHILS # BLD AUTO: 0 10*3/UL (ref 0–0.2)
BASOPHILS NFR BLD AUTO: 0 %
BUN SERPL-MCNC: 17.3 MG/DL (ref 8.4–25.7)
CALCIUM SERPL-MCNC: 9.3 MG/DL (ref 8.7–10.5)
CHLORIDE SERPL-SCNC: 110 MMOL/L (ref 98–107)
CHOLEST SERPL-MCNC: 147 MG/DL
CHOLEST/HDLC SERPL: 4 {RATIO} (ref 0–5)
CO2 SERPL-SCNC: 26 MMOL/L (ref 22–29)
CREAT SERPL-MCNC: 0.74 MG/DL (ref 0.73–1.18)
CREAT/UREA NIT SERPL: 23
EOSINOPHIL # BLD AUTO: 0 10*3/UL (ref 0–0.9)
EOSINOPHIL NFR BLD AUTO: 1 %
ERYTHROCYTE [DISTWIDTH] IN BLOOD BY AUTOMATED COUNT: 14.8 % (ref 11.5–14.5)
EST. AVERAGE GLUCOSE BLD GHB EST-MCNC: 116.9 MG/DL
FLAG2 (OHS): 50
FLAG3 (OHS): 80
FLAGS (OHS): 100
GLUCOSE SERPL-MCNC: 99 MG/DL (ref 74–100)
HBA1C MFR BLD: 5.7 %
HCT VFR BLD AUTO: 40.3 % (ref 40–51)
HDLC SERPL-MCNC: 34 MG/DL (ref 35–60)
HGB BLD-MCNC: 12.2 G/DL (ref 13.5–17.5)
ICTERIC INTERF INDEX SERPL-ACNC: 0
IMM GRANULOCYTES # BLD AUTO: 0.01 10*3/UL
IMM GRANULOCYTES NFR BLD AUTO: 0 %
LDLC SERPL CALC-MCNC: 97 MG/DL (ref 50–140)
LIPEMIC INTERF INDEX SERPL-ACNC: 2
LOW EVENT # SUSPECT FLAG (OHS): 90
LYMPHOCYTES # BLD AUTO: 2.3 10*3/UL (ref 0.6–4.6)
LYMPHOCYTES NFR BLD AUTO: 47 %
MANUAL DIFF? (OHS): NO
MCH RBC QN AUTO: 22.5 PG (ref 26–34)
MCHC RBC AUTO-ENTMCNC: 30.3 G/DL (ref 31–37)
MCV RBC AUTO: 74.4 FL (ref 80–100)
MO BLASTS SUSPECT FLAG (OHS): 60
MONOCYTES # BLD AUTO: 0.4 10*3/UL (ref 0.1–1.3)
MONOCYTES NFR BLD AUTO: 8 %
NEUTROPHILS # BLD AUTO: 2.1 10*3/UL (ref 2.1–9.2)
NEUTROPHILS NFR BLD AUTO: 43 %
NRBC BLD AUTO-RTO: 0 % (ref 0–0.2)
PLATELET # BLD AUTO: 264 10*3/UL (ref 130–400)
PLATELET CLUMPS SUSPECT FLAG (OHS): 60
PMV BLD AUTO: 11.1 FL (ref 7.4–10.4)
POTASSIUM SERPL-SCNC: 4 MMOL/L (ref 3.5–5.1)
RBC # BLD AUTO: 5.42 10*6/UL (ref 4.5–5.9)
SODIUM SERPL-SCNC: 141 MMOL/L (ref 136–145)
TRIGL SERPL-MCNC: 81 MG/DL (ref 34–140)
TSH SERPL-ACNC: 1.44 M[IU]/L (ref 0.35–4.94)
VLDLC SERPL CALC-MCNC: 16 MG/DL
WBC # SPEC AUTO: 4.9 10*3/UL (ref 4.5–11)
ZZGIANT PLATELETS (OHS): 20

## 2022-04-27 VITALS
BODY MASS INDEX: 30.71 KG/M2 | WEIGHT: 184.31 LBS | DIASTOLIC BLOOD PRESSURE: 73 MMHG | HEIGHT: 65 IN | SYSTOLIC BLOOD PRESSURE: 113 MMHG

## 2022-04-27 LAB
LEFT EYE DM RETINOPATHY: NEGATIVE
RIGHT EYE DM RETINOPATHY: NEGATIVE

## 2022-08-17 ENCOUNTER — DOCUMENTATION ONLY (OUTPATIENT)
Dept: ADMINISTRATIVE | Facility: HOSPITAL | Age: 57
End: 2022-08-17
Payer: MEDICARE

## 2022-10-17 ENCOUNTER — LAB VISIT (OUTPATIENT)
Dept: LAB | Facility: HOSPITAL | Age: 57
End: 2022-10-17
Attending: NURSE PRACTITIONER
Payer: MEDICARE

## 2022-10-17 DIAGNOSIS — D64.9 ANEMIA, UNSPECIFIED TYPE: Primary | ICD-10-CM

## 2022-10-17 DIAGNOSIS — E11.9 DM (DIABETES MELLITUS): ICD-10-CM

## 2022-10-17 DIAGNOSIS — Z00.00 WELLNESS EXAMINATION: ICD-10-CM

## 2022-10-17 DIAGNOSIS — Z12.5 ENCOUNTER FOR PROSTATE CANCER SCREENING: ICD-10-CM

## 2022-10-17 DIAGNOSIS — Z12.5 SCREENING PSA (PROSTATE SPECIFIC ANTIGEN): ICD-10-CM

## 2022-10-17 DIAGNOSIS — Z12.11 SCREENING FOR COLON CANCER: ICD-10-CM

## 2022-10-17 LAB
ANION GAP SERPL CALC-SCNC: 10 MEQ/L
BASOPHILS # BLD AUTO: 0.02 X10(3)/MCL (ref 0–0.2)
BASOPHILS NFR BLD AUTO: 0.4 %
BUN SERPL-MCNC: 17.1 MG/DL (ref 8.4–25.7)
CALCIUM SERPL-MCNC: 9.2 MG/DL (ref 8.4–10.2)
CHLORIDE SERPL-SCNC: 109 MMOL/L (ref 98–107)
CO2 SERPL-SCNC: 23 MMOL/L (ref 22–29)
CREAT SERPL-MCNC: 0.78 MG/DL (ref 0.73–1.18)
CREAT/UREA NIT SERPL: 22
EOSINOPHIL # BLD AUTO: 0.04 X10(3)/MCL (ref 0–0.9)
EOSINOPHIL NFR BLD AUTO: 0.9 %
ERYTHROCYTE [DISTWIDTH] IN BLOOD BY AUTOMATED COUNT: 14.6 % (ref 11.5–17)
EST. AVERAGE GLUCOSE BLD GHB EST-MCNC: 119.8 MG/DL
GFR SERPLBLD CREATININE-BSD FMLA CKD-EPI: >60 MLS/MIN/1.73/M2
GLUCOSE SERPL-MCNC: 99 MG/DL (ref 74–100)
HBA1C MFR BLD: 5.8 %
HCT VFR BLD AUTO: 41.2 % (ref 42–52)
HGB BLD-MCNC: 12.8 GM/DL (ref 14–18)
IMM GRANULOCYTES # BLD AUTO: 0.01 X10(3)/MCL (ref 0–0.04)
IMM GRANULOCYTES NFR BLD AUTO: 0.2 %
LYMPHOCYTES # BLD AUTO: 2.18 X10(3)/MCL (ref 0.6–4.6)
LYMPHOCYTES NFR BLD AUTO: 47.1 %
MCH RBC QN AUTO: 23.5 PG (ref 27–31)
MCHC RBC AUTO-ENTMCNC: 31.1 MG/DL (ref 33–36)
MCV RBC AUTO: 75.6 FL (ref 80–94)
MONOCYTES # BLD AUTO: 0.34 X10(3)/MCL (ref 0.1–1.3)
MONOCYTES NFR BLD AUTO: 7.3 %
NEUTROPHILS # BLD AUTO: 2 X10(3)/MCL (ref 2.1–9.2)
NEUTROPHILS NFR BLD AUTO: 44.1 %
NRBC BLD AUTO-RTO: 0 %
PLATELET # BLD AUTO: 239 X10(3)/MCL (ref 130–400)
PMV BLD AUTO: 10.7 FL (ref 7.4–10.4)
POTASSIUM SERPL-SCNC: 4.2 MMOL/L (ref 3.5–5.1)
PSA SERPL-MCNC: 3.03 NG/ML
RBC # BLD AUTO: 5.45 X10(6)/MCL (ref 4.7–6.1)
SODIUM SERPL-SCNC: 142 MMOL/L (ref 136–145)
WBC # SPEC AUTO: 4.6 X10(3)/MCL (ref 4.5–11.5)

## 2022-10-17 PROCEDURE — 82985 ASSAY OF GLYCATED PROTEIN: CPT

## 2022-10-17 PROCEDURE — 84153 ASSAY OF PSA TOTAL: CPT

## 2022-10-17 PROCEDURE — 83036 HEMOGLOBIN GLYCOSYLATED A1C: CPT

## 2022-10-17 PROCEDURE — 85025 COMPLETE CBC W/AUTO DIFF WBC: CPT

## 2022-10-17 PROCEDURE — 36415 COLL VENOUS BLD VENIPUNCTURE: CPT

## 2022-10-17 PROCEDURE — 30000890 MAYO GENERIC ORDERABLE

## 2022-10-17 PROCEDURE — 80048 BASIC METABOLIC PNL TOTAL CA: CPT

## 2022-10-18 LAB — MAYO GENERIC ORDERABLE RESULT: NORMAL

## 2022-10-26 ENCOUNTER — OFFICE VISIT (OUTPATIENT)
Dept: INTERNAL MEDICINE | Facility: CLINIC | Age: 57
End: 2022-10-26
Payer: MEDICARE

## 2022-10-26 VITALS
RESPIRATION RATE: 18 BRPM | WEIGHT: 186 LBS | HEIGHT: 64 IN | HEART RATE: 54 BPM | TEMPERATURE: 98 F | BODY MASS INDEX: 31.76 KG/M2 | DIASTOLIC BLOOD PRESSURE: 74 MMHG | SYSTOLIC BLOOD PRESSURE: 115 MMHG

## 2022-10-26 DIAGNOSIS — Z98.2 VP (VENTRICULOPERITONEAL) SHUNT STATUS: ICD-10-CM

## 2022-10-26 DIAGNOSIS — Z13.6 SCREENING FOR HYPERTENSION: ICD-10-CM

## 2022-10-26 DIAGNOSIS — D64.9 ANEMIA, UNSPECIFIED TYPE: ICD-10-CM

## 2022-10-26 DIAGNOSIS — G91.9 HYDROCEPHALUS, UNSPECIFIED TYPE: ICD-10-CM

## 2022-10-26 DIAGNOSIS — E11.9 CONTROLLED TYPE 2 DIABETES MELLITUS WITHOUT COMPLICATION, WITHOUT LONG-TERM CURRENT USE OF INSULIN: ICD-10-CM

## 2022-10-26 DIAGNOSIS — Z91.81 HISTORY OF FALL: ICD-10-CM

## 2022-10-26 DIAGNOSIS — Z00.00 WELL ADULT EXAM: ICD-10-CM

## 2022-10-26 DIAGNOSIS — R31.9 HEMATURIA, UNSPECIFIED TYPE: ICD-10-CM

## 2022-10-26 PROBLEM — E66.9 OBESITY: Status: ACTIVE | Noted: 2022-10-26

## 2022-10-26 PROBLEM — R51.9 PAIN IN HEAD: Status: ACTIVE | Noted: 2022-10-26

## 2022-10-26 PROBLEM — R26.89 LOSS OF BALANCE: Status: ACTIVE | Noted: 2022-10-26

## 2022-10-26 PROCEDURE — 99213 OFFICE O/P EST LOW 20 MIN: CPT | Mod: S$PBB,,, | Performed by: NURSE PRACTITIONER

## 2022-10-26 PROCEDURE — 99213 OFFICE O/P EST LOW 20 MIN: CPT | Mod: PBBFAC | Performed by: NURSE PRACTITIONER

## 2022-10-26 PROCEDURE — 99213 PR OFFICE/OUTPT VISIT, EST, LEVL III, 20-29 MIN: ICD-10-PCS | Mod: S$PBB,,, | Performed by: NURSE PRACTITIONER

## 2022-10-26 RX ORDER — TAMSULOSIN HYDROCHLORIDE 0.4 MG/1
1 CAPSULE ORAL DAILY
COMMUNITY
Start: 2022-10-03 | End: 2023-03-01 | Stop reason: SDUPTHER

## 2022-10-26 NOTE — PROGRESS NOTES
[facility]  [facility]ULSH[facility] Butcher     Patient Name: Jude Kaur Jr.   : 1965  MRN:[id number]     Review of patient's allergies indicates:  No Known Allergies   Medication List with Changes/Refills   Current Medications    TAMSULOSIN (FLOMAX) 0.4 MG CAP    Take 1 capsule by mouth once daily.        CC:  Chief Complaint   Patient presents with    lab results        HPI  57 y/o male for f/u lab results. Pt has hx Anemia, PreDM,  shunt for hydrocephalus, BPH. Pt followed by Dr Branden López Urologist. Pt also is followed by Dr Chavez Neuro surgery for  shunt. Pt states he has pain on right side of head where shunt is when lying down on that side. Denies HA.          ROS  Review of Systems   Constitutional: Negative.    HENT: Negative.     Eyes: Negative.    Respiratory: Negative.     Cardiovascular: Negative.    Gastrointestinal: Negative.    Endocrine: Negative.    Genitourinary: Negative.    Musculoskeletal: Negative.    Integumentary:  Negative.   Allergic/Immunologic: Negative.    Neurological: Negative.    Hematological: Negative.    Psychiatric/Behavioral: Negative.        Physical Examination:  Vitals:    10/26/22 0756   BP: 115/74   Pulse: (!) 54   Resp: 18   Temp: 97.8 °F (36.6 °C)          Physical Exam  Vitals reviewed.   Constitutional:       Appearance: Normal appearance. He is normal weight.   HENT:      Head: Normocephalic.   Cardiovascular:      Rate and Rhythm: Normal rate and regular rhythm.      Pulses: Normal pulses.      Heart sounds: Normal heart sounds.   Pulmonary:      Effort: Pulmonary effort is normal.      Breath sounds: Normal breath sounds.   Abdominal:      General: Abdomen is flat.      Palpations: Abdomen is soft.   Musculoskeletal:         General: Normal range of motion.      Cervical back: Normal range of motion.   Skin:     General: Skin is warm and dry.   Neurological:      Mental Status: He is alert.   Psychiatric:         Mood and Affect: Mood  normal.          Labs/Imaging:  Reviewed    Assessment/Plan:  1. Anemia, unspecified type  CBC in 3 months.     2. Controlled type 2 diabetes mellitus without complication, without long-term current use of insulin  A1c 5.8. Fructosamine level 224 indicating A1c between 5-6. Cont ADA diet and exercise. Encouraged stricter diet and exercise and if still elevated will start on DM meds. Pt verbalized understanding. Will get A1c in 6 months. DM eye exam done 4-27-22. DM foot exam done 4-27-22. Urine microalbumin done today  .  3. Hematuria, unspecified type  UA in 3 months     4. History of fall  Encouraged fall precautions.  Education provided.     5. Hydrocephalus, unspecified type  Pt has  shunt and followed by Dr Chavez. Keep appts. CT head without contrast 8-5-20 stable compared to 2018.    6. Screening for hypertension  BP controlled. Low fat low salt diet and exercise.     7.  (ventriculoperitoneal) shunt status  Pt has  shunt and followed by Dr Chavez. Keep appts. CT head without contrast 8-5-20.    8. Well adult exam   Labs in 3 months. UTD PSA. Pt was referred to [facility] for colonoscopy and received message pt not due till May 2024.       RTC in 3 months with labs 1 week prior to appt.

## 2022-11-20 ENCOUNTER — HOSPITAL ENCOUNTER (EMERGENCY)
Facility: HOSPITAL | Age: 57
Discharge: HOME | End: 2022-11-20
Attending: STUDENT IN AN ORGANIZED HEALTH CARE EDUCATION/TRAINING PROGRAM
Payer: MEDICARE

## 2022-11-20 VITALS
HEART RATE: 62 BPM | WEIGHT: 188.19 LBS | SYSTOLIC BLOOD PRESSURE: 120 MMHG | HEIGHT: 65 IN | BODY MASS INDEX: 31.35 KG/M2 | DIASTOLIC BLOOD PRESSURE: 74 MMHG | OXYGEN SATURATION: 99 % | RESPIRATION RATE: 16 BRPM | TEMPERATURE: 97 F

## 2022-11-20 DIAGNOSIS — R30.0 DYSURIA: ICD-10-CM

## 2022-11-20 DIAGNOSIS — R35.0 URINARY FREQUENCY: Primary | ICD-10-CM

## 2022-11-20 DIAGNOSIS — R39.15 URINARY URGENCY: ICD-10-CM

## 2022-11-20 LAB
APPEARANCE UR: CLEAR
BACTERIA #/AREA URNS AUTO: ABNORMAL /HPF
BILIRUB UR QL STRIP.AUTO: NEGATIVE MG/DL
COLOR UR AUTO: ABNORMAL
GLUCOSE UR QL STRIP.AUTO: NORMAL MG/DL
HYALINE CASTS #/AREA URNS LPF: ABNORMAL /LPF
KETONES UR QL STRIP.AUTO: NEGATIVE MG/DL
LEUKOCYTE ESTERASE UR QL STRIP.AUTO: NEGATIVE UNIT/L
MUCOUS THREADS URNS QL MICRO: ABNORMAL /LPF
NITRITE UR QL STRIP.AUTO: NEGATIVE
PH UR STRIP.AUTO: 6 [PH]
PROT UR QL STRIP.AUTO: NEGATIVE MG/DL
RBC #/AREA URNS AUTO: ABNORMAL /HPF
RBC UR QL AUTO: ABNORMAL UNIT/L
SP GR UR STRIP.AUTO: 1.02
SQUAMOUS #/AREA URNS LPF: ABNORMAL /HPF
UROBILINOGEN UR STRIP-ACNC: NORMAL MG/DL
WBC #/AREA URNS AUTO: ABNORMAL /HPF

## 2022-11-20 PROCEDURE — 99282 EMERGENCY DEPT VISIT SF MDM: CPT

## 2022-11-20 PROCEDURE — 81001 URINALYSIS AUTO W/SCOPE: CPT | Performed by: PHYSICIAN ASSISTANT

## 2022-11-20 NOTE — ED PROVIDER NOTES
Encounter Date: 11/20/2022       History     Chief Complaint   Patient presents with    Urinary Frequency     Patient reports urinary frequency, dysuria, and hesitancy x 6 days. Denies hematuria.     Patient with pmhx of hydrocephalus presents today c/o increased urinary frequency, dysuria, urgency, and hesitancy for 1 week. He is concerned he is having issues with his prostate. No exacerbating or relieving factors. Denies fever, chills, abdominal pain, back/flank pain, hematuria, nausea, vomiting, weakness.     The history is provided by the patient. No  was used.   Review of patient's allergies indicates:  No Known Allergies  No past medical history on file.  Past Surgical History:   Procedure Laterality Date    COLONOSCOPY  05/28/2014    Gonsalo Gonzalez MD     No family history on file.  Social History     Tobacco Use    Smoking status: Never    Smokeless tobacco: Never     Review of Systems   Constitutional:  Negative for chills and fever.   Respiratory:  Negative for cough, chest tightness and shortness of breath.    Cardiovascular:  Negative for chest pain, palpitations and leg swelling.   Gastrointestinal:  Negative for abdominal pain, constipation, diarrhea and vomiting.   Genitourinary:  Positive for dysuria, frequency and urgency. Negative for decreased urine volume, difficulty urinating, enuresis, flank pain, genital sores, hematuria, penile discharge, penile pain, penile swelling, scrotal swelling and testicular pain.   Musculoskeletal:  Negative for arthralgias and myalgias.   Skin:  Negative for rash.   Neurological:  Negative for syncope, light-headedness and headaches.     Physical Exam     Initial Vitals [11/20/22 1110]   BP Pulse Resp Temp SpO2   124/70 65 18 97.3 °F (36.3 °C) 98 %      MAP       --         Physical Exam    Vitals reviewed.  Constitutional: He appears well-developed and well-nourished. He is not diaphoretic. No distress.   HENT:   Head: Normocephalic and  atraumatic.   Mouth/Throat: Oropharynx is clear and moist. No oropharyngeal exudate.   Eyes: Conjunctivae and EOM are normal.   Neck: Neck supple.   Normal range of motion.  Cardiovascular:  Normal rate, regular rhythm, normal heart sounds and intact distal pulses.           Pulmonary/Chest: Breath sounds normal. No respiratory distress.   Abdominal: Abdomen is soft. Bowel sounds are normal. He exhibits no distension. There is no abdominal tenderness. There is no rebound and no guarding.   Musculoskeletal:         General: No edema.      Cervical back: Normal range of motion and neck supple.     Neurological: He is alert and oriented to person, place, and time. GCS score is 15. GCS eye subscore is 4. GCS verbal subscore is 5. GCS motor subscore is 6.   Skin: Skin is warm and dry. Capillary refill takes less than 2 seconds.   Psychiatric: He has a normal mood and affect.       ED Course   Procedures  Labs Reviewed   URINALYSIS, REFLEX TO URINE CULTURE - Abnormal; Notable for the following components:       Result Value    Blood, UA Trace (*)     Squamous Epithelial Cells, UA Occ (*)     Mucous, UA Trace (*)     All other components within normal limits          Imaging Results    None          Medications - No data to display        APC / Resident Notes:   I was not physically present during the history, exam and disposition of this patient.  I was available at times for consultation. (Zmora)                Clinical Impression:   Final diagnoses:  [R35.0] Urinary frequency (Primary)  [R30.0] Dysuria  [R39.15] Urinary urgency      ED Disposition Condition    Discharge Stable          ED Prescriptions    None       Follow-up Information       Follow up With Specialties Details Why Contact Info    [facility] - Emergency Dept Emergency Medicine  If symptoms worsen return to ED immediately [address] [facility] [address] [phone]    [facility] In 2 days  [address]  [facility] [address]  [phone]               EDYTA Pollack  11/20/22 1326       Tristen Schrader MD  11/20/22 5652

## 2022-12-19 ENCOUNTER — OFFICE VISIT (OUTPATIENT)
Dept: UROLOGY | Facility: CLINIC | Age: 57
End: 2022-12-19
Payer: MEDICARE

## 2022-12-19 VITALS
BODY MASS INDEX: 31.81 KG/M2 | RESPIRATION RATE: 20 BRPM | SYSTOLIC BLOOD PRESSURE: 119 MMHG | WEIGHT: 190.94 LBS | OXYGEN SATURATION: 99 % | DIASTOLIC BLOOD PRESSURE: 78 MMHG | HEART RATE: 62 BPM | TEMPERATURE: 98 F | HEIGHT: 65 IN

## 2022-12-19 DIAGNOSIS — R35.0 URINARY FREQUENCY: ICD-10-CM

## 2022-12-19 DIAGNOSIS — R39.15 URINARY URGENCY: ICD-10-CM

## 2022-12-19 DIAGNOSIS — N13.8 BPH WITH OBSTRUCTION/LOWER URINARY TRACT SYMPTOMS: Primary | ICD-10-CM

## 2022-12-19 DIAGNOSIS — N40.1 BPH WITH OBSTRUCTION/LOWER URINARY TRACT SYMPTOMS: Primary | ICD-10-CM

## 2022-12-19 LAB — POC RESIDUAL URINE VOLUME: 83 ML (ref 0–100)

## 2022-12-19 PROCEDURE — 99204 OFFICE O/P NEW MOD 45 MIN: CPT | Mod: S$PBB,,, | Performed by: UROLOGY

## 2022-12-19 PROCEDURE — 51798 US URINE CAPACITY MEASURE: CPT | Mod: PBBFAC | Performed by: UROLOGY

## 2022-12-19 PROCEDURE — 99204 PR OFFICE/OUTPT VISIT, NEW, LEVL IV, 45-59 MIN: ICD-10-PCS | Mod: S$PBB,,, | Performed by: UROLOGY

## 2022-12-19 PROCEDURE — 99214 OFFICE O/P EST MOD 30 MIN: CPT | Mod: PBBFAC | Performed by: UROLOGY

## 2022-12-19 NOTE — PROGRESS NOTES
CC:  Urinary frequency    HPI:  Jude Kaur Jr. is a 57 y.o. male being seen in consultation for urinary frequency.  He complains of urinary frequency and nocturia three to four times.  He will urinate and then have to return a few minutes later for only a few drops of urine.  He also has urgency but no urge incontinence.   He does drink a lot of coffee throughout the day.  He saw a urologist who gave him Flomax and that is helping but the frequency and urgency are persisting.      Bladder scan residual:  83 ml    Lab Results:  PSA - 17 October 2022:  3.03.    Data Review:  Note from referring provider, Tristen Schrader MD, dated 20 November 2022. PSA.    ROS:  All systems reviewed and are negative except as documented in HPI and/or Assessment and Plan.     Patient Active Problem List:     Patient Active Problem List   Diagnosis    Anemia    Controlled type 2 diabetes mellitus without complication, without long-term current use of insulin    Hematuria    History of fall    Hydrocephalus    Loss of balance    Obesity    Pain in head    Screening for hypertension     (ventriculoperitoneal) shunt status    Well adult exam        Past Medical History:  History reviewed. No pertinent past medical history.     Past Surgical History:  Past Surgical History:   Procedure Laterality Date    COLONOSCOPY  05/28/2014    Gonsalo Gonzalez MD        Family History:  History reviewed. No pertinent family history.     Social History:  Social History     Socioeconomic History    Marital status:    Tobacco Use    Smoking status: Never     Passive exposure: Past    Smokeless tobacco: Never        Allergies:  Review of patient's allergies indicates:  No Known Allergies     Objective:  Vitals:    12/19/22 1111   BP: 119/78   Pulse: 62   Resp: 20   Temp: 97.7 °F (36.5 °C)     General:  Well developed, well nourished adult male in no acute distress  Abdomen: Soft, nontender, no masses  Extremities:  No clubbing, cyanosis, or  edema  Neurologic:  Grossly intact  Musculoskeletal:  Normal tone  Penis:  Circumcised, no lesions  Scrotum:  No hydrocele  Testicles:  Nontender, no masses  Epididymis:  Normal without masses  Prostate exam:  Done by Dr. López earlier this year.     Assessment:  1. Urinary frequency  - Ambulatory referral/consult to Urology  - mirabegron (MYRBETRIQ) 50 mg Tb24; Take 1 tablet (50 mg total) by mouth once daily.  Dispense: 90 tablet; Refill: 3    2. Urinary urgency  - Ambulatory referral/consult to Urology  - mirabegron (MYRBETRIQ) 50 mg Tb24; Take 1 tablet (50 mg total) by mouth once daily.  Dispense: 90 tablet; Refill: 3    3. BPH with obstruction/lower urinary tract symptoms     Plan:  1 and 2:  Myrbetriq 50 mg given.  I hesitate to give Oxybutynin secondary to his history of hydrocephalus.  3.  Continue Flomax.     Follow-up:  Six weeks with bladder scan.

## 2022-12-19 NOTE — PROGRESS NOTES
Placed in room. Seen by Dr. Tatum. Spoke with patient. Bladder scan done at 83 ml. RTC in 6 weeks with bladder scan.

## 2023-01-17 ENCOUNTER — LAB VISIT (OUTPATIENT)
Dept: LAB | Facility: HOSPITAL | Age: 58
End: 2023-01-17
Attending: NURSE PRACTITIONER
Payer: MEDICARE

## 2023-01-17 DIAGNOSIS — D64.9 ANEMIA, UNSPECIFIED TYPE: ICD-10-CM

## 2023-01-17 DIAGNOSIS — E11.9 CONTROLLED TYPE 2 DIABETES MELLITUS WITHOUT COMPLICATION, WITHOUT LONG-TERM CURRENT USE OF INSULIN: ICD-10-CM

## 2023-01-17 DIAGNOSIS — R31.9 HEMATURIA, UNSPECIFIED TYPE: ICD-10-CM

## 2023-01-17 LAB
ALBUMIN SERPL-MCNC: 3.8 G/DL (ref 3.5–5)
ALBUMIN/GLOB SERPL: 1.2 RATIO (ref 1.1–2)
ALP SERPL-CCNC: 92 UNIT/L (ref 40–150)
ALT SERPL-CCNC: 36 UNIT/L (ref 0–55)
APPEARANCE UR: CLEAR
AST SERPL-CCNC: 22 UNIT/L (ref 5–34)
BACTERIA #/AREA URNS AUTO: ABNORMAL /HPF
BASOPHILS # BLD AUTO: 0.02 X10(3)/MCL (ref 0–0.2)
BASOPHILS NFR BLD AUTO: 0.4 %
BILIRUB UR QL STRIP.AUTO: NEGATIVE MG/DL
BILIRUBIN DIRECT+TOT PNL SERPL-MCNC: 0.4 MG/DL
BUN SERPL-MCNC: 16 MG/DL (ref 8.4–25.7)
CALCIUM SERPL-MCNC: 9.1 MG/DL (ref 8.4–10.2)
CHLORIDE SERPL-SCNC: 111 MMOL/L (ref 98–107)
CO2 SERPL-SCNC: 27 MMOL/L (ref 22–29)
COLOR UR AUTO: COLORLESS
CREAT SERPL-MCNC: 0.8 MG/DL (ref 0.73–1.18)
EOSINOPHIL # BLD AUTO: 0.05 X10(3)/MCL (ref 0–0.9)
EOSINOPHIL NFR BLD AUTO: 1.1 %
ERYTHROCYTE [DISTWIDTH] IN BLOOD BY AUTOMATED COUNT: 14.8 % (ref 11.5–17)
GFR SERPLBLD CREATININE-BSD FMLA CKD-EPI: >60 MLS/MIN/1.73/M2
GLOBULIN SER-MCNC: 3.3 GM/DL (ref 2.4–3.5)
GLUCOSE SERPL-MCNC: 96 MG/DL (ref 74–100)
GLUCOSE UR QL STRIP.AUTO: NORMAL MG/DL
HCT VFR BLD AUTO: 40 % (ref 42–52)
HGB BLD-MCNC: 12.3 GM/DL (ref 14–18)
HYALINE CASTS #/AREA URNS LPF: ABNORMAL /LPF
IMM GRANULOCYTES # BLD AUTO: 0.02 X10(3)/MCL (ref 0–0.04)
IMM GRANULOCYTES NFR BLD AUTO: 0.4 %
KETONES UR QL STRIP.AUTO: NEGATIVE MG/DL
LEUKOCYTE ESTERASE UR QL STRIP.AUTO: NEGATIVE UNIT/L
LYMPHOCYTES # BLD AUTO: 2.54 X10(3)/MCL (ref 0.6–4.6)
LYMPHOCYTES NFR BLD AUTO: 53.8 %
MCH RBC QN AUTO: 23.3 PG
MCHC RBC AUTO-ENTMCNC: 30.8 MG/DL (ref 33–36)
MCV RBC AUTO: 75.6 FL (ref 80–94)
MONOCYTES # BLD AUTO: 0.36 X10(3)/MCL (ref 0.1–1.3)
MONOCYTES NFR BLD AUTO: 7.6 %
MUCOUS THREADS URNS QL MICRO: ABNORMAL /LPF
NEUTROPHILS # BLD AUTO: 1.73 X10(3)/MCL (ref 2.1–9.2)
NEUTROPHILS NFR BLD AUTO: 36.7 %
NITRITE UR QL STRIP.AUTO: NEGATIVE
NRBC BLD AUTO-RTO: 0 %
PH UR STRIP.AUTO: 5.5 [PH]
PLATELET # BLD AUTO: 235 X10(3)/MCL (ref 130–400)
PMV BLD AUTO: 10.6 FL (ref 7.4–10.4)
POTASSIUM SERPL-SCNC: 4 MMOL/L (ref 3.5–5.1)
PROT SERPL-MCNC: 7.1 GM/DL (ref 6.4–8.3)
PROT UR QL STRIP.AUTO: NEGATIVE MG/DL
RBC # BLD AUTO: 5.29 X10(6)/MCL (ref 4.7–6.1)
RBC #/AREA URNS AUTO: ABNORMAL /HPF
RBC UR QL AUTO: ABNORMAL UNIT/L
SODIUM SERPL-SCNC: 142 MMOL/L (ref 136–145)
SP GR UR STRIP.AUTO: 1.01
SQUAMOUS #/AREA URNS LPF: ABNORMAL /HPF
UROBILINOGEN UR STRIP-ACNC: NORMAL MG/DL
WBC # SPEC AUTO: 4.7 X10(3)/MCL (ref 4.5–11.5)
WBC #/AREA URNS AUTO: ABNORMAL /HPF

## 2023-01-17 PROCEDURE — 36415 COLL VENOUS BLD VENIPUNCTURE: CPT

## 2023-01-17 PROCEDURE — 80053 COMPREHEN METABOLIC PANEL: CPT

## 2023-01-17 PROCEDURE — 85025 COMPLETE CBC W/AUTO DIFF WBC: CPT

## 2023-01-17 PROCEDURE — 81001 URINALYSIS AUTO W/SCOPE: CPT

## 2023-01-26 ENCOUNTER — OFFICE VISIT (OUTPATIENT)
Dept: INTERNAL MEDICINE | Facility: CLINIC | Age: 58
End: 2023-01-26
Payer: MEDICARE

## 2023-01-26 VITALS
HEIGHT: 65 IN | SYSTOLIC BLOOD PRESSURE: 117 MMHG | RESPIRATION RATE: 18 BRPM | TEMPERATURE: 98 F | DIASTOLIC BLOOD PRESSURE: 76 MMHG | HEART RATE: 60 BPM | BODY MASS INDEX: 32.32 KG/M2 | WEIGHT: 194 LBS

## 2023-01-26 DIAGNOSIS — Z98.2 VP (VENTRICULOPERITONEAL) SHUNT STATUS: ICD-10-CM

## 2023-01-26 DIAGNOSIS — R42 DIZZINESS: ICD-10-CM

## 2023-01-26 DIAGNOSIS — D64.9 ANEMIA, UNSPECIFIED TYPE: Primary | ICD-10-CM

## 2023-01-26 DIAGNOSIS — Z13.6 SCREENING FOR HYPERTENSION: ICD-10-CM

## 2023-01-26 DIAGNOSIS — Z91.81 HISTORY OF FALL: ICD-10-CM

## 2023-01-26 DIAGNOSIS — G91.9 HYDROCEPHALUS, UNSPECIFIED TYPE: ICD-10-CM

## 2023-01-26 DIAGNOSIS — Z00.00 WELL ADULT EXAM: ICD-10-CM

## 2023-01-26 DIAGNOSIS — E11.9 CONTROLLED TYPE 2 DIABETES MELLITUS WITHOUT COMPLICATION, WITHOUT LONG-TERM CURRENT USE OF INSULIN: ICD-10-CM

## 2023-01-26 PROCEDURE — 99214 PR OFFICE/OUTPT VISIT, EST, LEVL IV, 30-39 MIN: ICD-10-PCS | Mod: S$PBB,,, | Performed by: NURSE PRACTITIONER

## 2023-01-26 PROCEDURE — 99214 OFFICE O/P EST MOD 30 MIN: CPT | Mod: PBBFAC | Performed by: NURSE PRACTITIONER

## 2023-01-26 PROCEDURE — 99214 OFFICE O/P EST MOD 30 MIN: CPT | Mod: S$PBB,,, | Performed by: NURSE PRACTITIONER

## 2023-01-26 NOTE — PROGRESS NOTES
Patient ID: [id number]     Chief Complaint: LAB RESULTS        HPI:     HPI      Jude Kaur Jr. is a 57 y.o. male here today for a follow up.       No past medical history on file.     Past Surgical History:   Procedure Laterality Date    COLONOSCOPY  05/28/2014    Gonsalo Gonzalez MD       Review of patient's allergies indicates:  No Known Allergies    Current Outpatient Medications   Medication Instructions    mirabegron (MYRBETRIQ) 50 mg, Oral, Daily    tamsulosin (FLOMAX) 0.4 mg Cap 1 capsule, Oral, Daily       Social History     Socioeconomic History    Marital status:    Tobacco Use    Smoking status: Never     Passive exposure: Past    Smokeless tobacco: Never        History reviewed. No pertinent family history.     Patient Care Team:  GULSHAN Canchola as PCP - General (Family Medicine)     Subjective:     Review of Systems     See HPI for details    Constitutional: Denies Change in appetite. Denies Chills. Denies Fever. Denies Night sweats.  Eye: Denies Blurred vision. Denies Discharge. Denies Eye pain.  ENT: Denies Decreased hearing. Denies Sore throat. Denies Swollen glands.  Respiratory: Denies Cough. Denies Shortness of breath. Denies Shortness of breath with exertion. Denies Wheezing.  Cardiovascular: DeniesChest pain at rest. Denies Chest pain with exertion. Denies Irregular heartbeat. Denies Palpitations. Denies Edema.  Gastrointestinal: Denies Abdominal pain. DeniesDiarrhea. Denies Nausea. Denies Vomiting. Denies Hematemesis or Hematochezia.  Genitourinary: Denies Dysuria. Denies Urinary frequency. Denies Urinary urgency. Denies Blood in urine.  Endocrine: Denies Cold intolerance. Denies Excessive thirst. Denies Heat intolerance. Denies Weight loss. Denies Weight gain.  Musculoskeletal: Denies Painful joints. Denies Weakness.  Integumentary: Denies Rash. Denies Itching. Denies Dry skin.  Neurologic: AdmitsDizziness. Denies Fainting. Denies Headache.  Psychiatric: Denies  "Depression. Denies Anxiety. Denies Suicidal/Homicidal ideations.    All Other ROS: Negative except as stated in HPI.       Objective:     Visit Vitals  /76 (BP Location: Left arm, Patient Position: Sitting, BP Method: Medium (Automatic))   Pulse 60   Temp 97.8 °F (36.6 °C) (Oral)   Resp 18   Ht 5' 5" (1.651 m)   Wt 88 kg (194 lb 0.1 oz)   BMI 32.28 kg/m²       Physical Exam    General: Alert and oriented, No acute distress.  Head: Normocephalic, Atraumatic.  Eye: Pupils are equal, round and reactive to light, Extraocular movements are intact, Sclera non-icteric.  Ears/Nose/Throat: Normal, Mucosa moist,Clear.  Neck/Thyroid: Supple, Non-tender, No carotid bruit, No lymphadenopathy, No JVD, Full range of motion.  Respiratory: Clear to auscultation bilaterally; No wheezes, rales or rhonchi,Non-labored respirations, Symmetrical chest wall expansion.  Cardiovascular: Regular rate and rhythm, S1/S2 normal, No murmurs, rubs or gallops.  Gastrointestinal: Soft, Non-tender, Non-distended, Normal bowel sounds, No palpable organomegaly.  Musculoskeletal: Normal range of motion.  Integumentary: Warm, Dry, Intact, No suspicious lesions or rashes.  Extremities: No clubbing, cyanosis or edema  Neurologic: No focal deficits, Cranial Nerves II-XII are grossly intact, Motor strength normal upper and lower extremities, Sensory exam intact.  Psychiatric: Normal interaction, Coherent speech, Euthymic mood, Appropriate affect       Labs Reviewed:     Chemistry:  Lab Results   Component Value Date     01/17/2023    K 4.0 01/17/2023    CHLORIDE 111 (H) 01/17/2023    BUN 16.0 01/17/2023    CREATININE 0.80 01/17/2023    EGFRNORACEVR >60 01/17/2023    GLUCOSE 96 01/17/2023    CALCIUM 9.1 01/17/2023    ALKPHOS 92 01/17/2023    LABPROT 7.1 01/17/2023    ALBUMIN 3.8 01/17/2023    BILIDIR 0.2 03/31/2022    IBILI 0.20 03/31/2022    AST 22 01/17/2023    ALT 36 01/17/2023    MG 2.00 03/31/2022        Lab Results   Component Value Date    " HGBA1C 5.8 10/17/2022        Hematology:  Lab Results   Component Value Date    WBC 4.7 01/17/2023    HGB 12.3 (L) 01/17/2023    HCT 40.0 (L) 01/17/2023     01/17/2023       Lipid Panel:  Lab Results   Component Value Date    CHOL 147 04/20/2022    HDL 34 04/20/2022    LDL 97.00 04/20/2022    TRIG 81 04/20/2022    TOTALCHOLEST 4 04/20/2022        Urine:  Lab Results   Component Value Date    COLORUA Colorless (A) 01/17/2023    APPEARANCEUA Clear 01/17/2023    SGUA 1.012 01/17/2023    PHUA 5.5 01/17/2023    PROTEINUA Negative 01/17/2023    GLUCOSEUA Normal 01/17/2023    KETONESUA Negative 01/17/2023    BLOODUA Trace (A) 01/17/2023    NITRITESUA Negative 01/17/2023    LEUKOCYTESUR Negative 01/17/2023    RBCUA 0-5 01/17/2023    WBCUA 0-5 01/17/2023    BACTERIA None Seen 01/17/2023    SQEPUA Trace (A) 01/17/2023    HYALINECASTS None Seen 01/17/2023    CREATRANDUR 203.3 (H) 10/26/2022        Assessment:       ICD-10-CM ICD-9-CM   1. Anemia, unspecified type  D64.9 285.9   2. Controlled type 2 diabetes mellitus without complication, without long-term current use of insulin  E11.9 250.00   3. History of fall  Z91.81 V15.88   4. Hydrocephalus, unspecified type  G91.9 331.4   5. Screening for hypertension  Z13.6 V81.1   6.  (ventriculoperitoneal) shunt status  Z98.2 V45.2   7. Well adult exam  Z00.00 V70.0        Plan:     1. Anemia, unspecified type  CBC in 3 months.     2. Controlled type 2 diabetes mellitus without complication, without long-term current use of insulin  A1c 5.8. Fructosamine level 224 indicating A1c between 5-6. Cont ADA diet and exercise. Encouraged stricter diet and exercise and if still elevated will start on DM meds. Pt verbalized understanding. Will get A1c in 3 months. DM eye exam done 4-27-22. DM foot exam done 4-27-22. Urine microalbumin done 10-26-22.    3. History of fall  Encouraged fall precautions.  Education provided.     4. Hydrocephalus, unspecified type  Pt has  shunt and  followed by Dr Chavez. Keep appts. CT head without contrast 8-5-20 stable compared to 2018.    5. Screening for hypertension  BP controlled. Low fat low salt diet and exercise. Labs in 3 months.     6.  (ventriculoperitoneal) shunt status  Pt has  shunt and followed by Dr Chavez. Keep appts. CT head without contrast 8-5-20.    7. Well adult exam   Labs in 3 months. UTD PSA. Pt was referred to [facility] for colonoscopy and received message pt not due till May 2024.          Follow up in about 3 months (around 4/26/2023) for [facility] labs 1 week prior to appt. In addition to their scheduled follow up, the patient has also been instructed to follow up on as needed basis.     Future Appointments   Date Time Provider Department Center   2/1/2023 11:00 AM Andreea Flores DO [facility]y

## 2023-01-30 ENCOUNTER — TELEPHONE (OUTPATIENT)
Dept: UROLOGY | Facility: CLINIC | Age: 58
End: 2023-01-30
Payer: MEDICARE

## 2023-01-30 PROBLEM — Z13.6 SCREENING FOR HYPERTENSION: Status: RESOLVED | Noted: 2022-10-26 | Resolved: 2023-01-30

## 2023-01-30 PROBLEM — Z00.00 WELL ADULT EXAM: Status: RESOLVED | Noted: 2022-10-26 | Resolved: 2023-01-30

## 2023-02-01 ENCOUNTER — OFFICE VISIT (OUTPATIENT)
Dept: UROLOGY | Facility: CLINIC | Age: 58
End: 2023-02-01
Payer: MEDICARE

## 2023-02-01 VITALS — TEMPERATURE: 98 F | OXYGEN SATURATION: 98 %

## 2023-02-01 DIAGNOSIS — R39.15 URINARY URGENCY: ICD-10-CM

## 2023-02-01 DIAGNOSIS — R35.0 URINARY FREQUENCY: Primary | ICD-10-CM

## 2023-02-01 DIAGNOSIS — N13.8 BPH WITH OBSTRUCTION/LOWER URINARY TRACT SYMPTOMS: ICD-10-CM

## 2023-02-01 DIAGNOSIS — N40.1 BPH WITH OBSTRUCTION/LOWER URINARY TRACT SYMPTOMS: ICD-10-CM

## 2023-02-01 LAB — POC RESIDUAL URINE VOLUME: 166 ML (ref 0–100)

## 2023-02-01 PROCEDURE — 99213 OFFICE O/P EST LOW 20 MIN: CPT | Mod: S$PBB,,, | Performed by: UROLOGY

## 2023-02-01 PROCEDURE — 99213 PR OFFICE/OUTPT VISIT, EST, LEVL III, 20-29 MIN: ICD-10-PCS | Mod: S$PBB,,, | Performed by: UROLOGY

## 2023-02-01 PROCEDURE — 51798 US URINE CAPACITY MEASURE: CPT | Mod: PBBFAC | Performed by: UROLOGY

## 2023-02-01 PROCEDURE — 99214 OFFICE O/P EST MOD 30 MIN: CPT | Mod: PBBFAC | Performed by: UROLOGY

## 2023-02-01 RX ORDER — OXYBUTYNIN CHLORIDE 10 MG/1
10 TABLET, EXTENDED RELEASE ORAL DAILY
Qty: 90 TABLET | Refills: 3 | Status: SHIPPED | OUTPATIENT
Start: 2023-02-01 | End: 2024-02-20 | Stop reason: SDUPTHER

## 2023-02-01 NOTE — PROGRESS NOTES
Patient sent by Dr. Flores. Patient instructed to RTC in 4 weeks with bladder scan. Bladder scan was done on patient today, which came out to be 166mL.

## 2023-02-01 NOTE — PROGRESS NOTES
CC:  Medication follow-up    HPI:  Jude Kaur Jr. is a 57 y.o. male seen for follow-up of medication trial.  He has complaints of urinary frequency during the day and nocturia up to five times.  He also has urgency.  I gave him Myrbetriq at the last visit but he was unable to afford the medication so he hasn't been on it.  The symptoms are persisting.  He has a history of hydrocephalus.    He is on tamsulosin for BPH and has no obstructive symptoms.      Bladder scan residual:  116 ml but it was not done immediately post void and he has urinated since.    Lab Results:  Recent Labs     01/17/23  1036   CREATININE 0.80       ROS:  All systems reviewed and are negative except as documented in HPI and/or Assessment and Plan.     Patient Active Problem List:     Patient Active Problem List   Diagnosis    Anemia    Controlled type 2 diabetes mellitus without complication, without long-term current use of insulin    Hematuria    History of fall    Hydrocephalus    Loss of balance    Obesity    Pain in head     (ventriculoperitoneal) shunt status        Past Medical History:  History reviewed. No pertinent past medical history.     Past Surgical History:  Past Surgical History:   Procedure Laterality Date    COLONOSCOPY  05/28/2014    Gonsalo Gonzalez MD        Family History:  Family History   Problem Relation Age of Onset    Diabetes Mother     Heart disease Mother     Hypertension Mother     Diabetes Father         Social History:  Social History     Socioeconomic History    Marital status:    Tobacco Use    Smoking status: Never     Passive exposure: Past    Smokeless tobacco: Never   Substance and Sexual Activity    Alcohol use: Never    Drug use: Never    Sexual activity: Yes     Birth control/protection: None        Allergies:  Review of patient's allergies indicates:  No Known Allergies     Objective:  Vitals:    02/01/23 1053   BP: (P) 123/66   Pulse: (P) 66   Resp: (P) 19   Temp: 97.9 °F (36.6 °C)      General:  Well developed, well nourished adult male in no acute distress  Abdomen: Soft, nontender, no masses  Extremities:  No clubbing, cyanosis, or edema  Neurologic:  Grossly intact  Musculoskeletal:  Normal tone    Assessment:  1. Urinary frequency  - oxybutynin (DITROPAN-XL) 10 MG 24 hr tablet; Take 1 tablet (10 mg total) by mouth once daily.  Dispense: 90 tablet; Refill: 3  - POCT Bladder Scan    2. Urinary urgency  - oxybutynin (DITROPAN-XL) 10 MG 24 hr tablet; Take 1 tablet (10 mg total) by mouth once daily.  Dispense: 90 tablet; Refill: 3  - POCT Bladder Scan    3. BPH with obstruction/lower urinary tract symptoms  - POCT Bladder Scan     Plan:  1 and 2.  Will give him Oxybutynin 10 mg.    3.  Continue tamsulosin.      Follow-up:  Four weeks for bladder scan.

## 2023-02-27 ENCOUNTER — OFFICE VISIT (OUTPATIENT)
Dept: INTERNAL MEDICINE | Facility: CLINIC | Age: 58
End: 2023-02-27
Payer: MEDICARE

## 2023-02-27 DIAGNOSIS — R73.03 PREDIABETES: ICD-10-CM

## 2023-02-27 DIAGNOSIS — G91.9 HYDROCEPHALUS, UNSPECIFIED TYPE: Primary | ICD-10-CM

## 2023-02-27 PROCEDURE — 99441 PR PHYSICIAN TELEPHONE EVALUATION 5-10 MIN: ICD-10-PCS | Mod: 95,,, | Performed by: NURSE PRACTITIONER

## 2023-02-27 PROCEDURE — 99441 PR PHYSICIAN TELEPHONE EVALUATION 5-10 MIN: CPT | Mod: 95,,, | Performed by: NURSE PRACTITIONER

## 2023-02-27 NOTE — PROGRESS NOTES
Patient ID: [id number]     Chief Complaint: CAT SCAN RESULTS      HPI:     This is a telemedicine note. Patient was treated using telemedicine, real time audio and video, according to [facility] protocols. I, Pretty CANTU, conducted the visit from the [facility]. The patient participated in the visit at a non-[facility] location selected by the patient, identified below. I am licensed in the state where the patient stated they are located. The patient stated that they understood and accepted the privacy and security risks to their information at their location. This visit is not recorded.    Patient was located at the patient's home.       Jude Kaur Jr. is a 57 y.o. male here today for a telemedicine visit.       No past medical history on file.     Past Surgical History:   Procedure Laterality Date    COLONOSCOPY  05/28/2014    Gonsalo Gonzalez MD       Review of patient's allergies indicates:  No Known Allergies    Outpatient Medications Marked as Taking for the 2/27/23 encounter (Office Visit) with GULSHAN Canchola   Medication Sig Dispense Refill    mirabegron (MYRBETRIQ) 50 mg Tb24 Take 1 tablet (50 mg total) by mouth once daily. 90 tablet 3    oxybutynin (DITROPAN-XL) 10 MG 24 hr tablet Take 1 tablet (10 mg total) by mouth once daily. 90 tablet 3    tamsulosin (FLOMAX) 0.4 mg Cap Take 1 capsule by mouth once daily.         Social History     Socioeconomic History    Marital status:    Tobacco Use    Smoking status: Never     Passive exposure: Past    Smokeless tobacco: Never   Substance and Sexual Activity    Alcohol use: Never    Drug use: Never    Sexual activity: Yes     Birth control/protection: None        Family History   Problem Relation Age of Onset    Diabetes Mother     Heart disease Mother     Hypertension Mother     Diabetes Father         Patient Care Team:  GULSHAN Canchola as PCP - General (Family Medicine)      Subjective:       Review of Systems        See HPI for details    Constitutional: Denies Change in appetite. Denies Chills. Denies Fever. Denies Night sweats.  Eye: Denies Blurred vision. Denies Discharge. Denies Eye pain.  ENT: Denies Decreased hearing. Denies Sore throat. Denies Swollen glands.  Respiratory: Denies Cough. Denies Shortness of breath. Denies Shortness of breath with exertion. Denies Wheezing.  Cardiovascular: DeniesChest pain at rest. Denies Chest pain with exertion. Denies Irregular heartbeat. Denies Palpitations. Denies Edema.  Gastrointestinal: Denies Abdominal pain. DeniesDiarrhea. Denies Nausea. Denies Vomiting. Denies Hematemesis or Hematochezia.  Genitourinary: Denies Dysuria. Denies Urinary frequency. Denies Urinary urgency. Denies Blood in urine.  Endocrine: Denies Cold intolerance. Denies Excessive thirst. Denies Heat intolerance. Denies Weight loss. Denies Weight gain.  Musculoskeletal: Denies Painful joints. Denies Weakness.  Integumentary: Denies Rash. Denies Itching. Denies Dry skin.  Neurologic: Denies Dizziness. Denies Fainting. Denies Headache.  Psychiatric: Denies Depression. Denies Anxiety. Denies Suicidal/Homicidal ideations.    All Other ROS: Negative except as stated in HPI.       Objective:     There were no vitals taken for this visit.    Physical Exam    Physical Exam: LIMITED DUE TO TELEMEDICINE RESTRICTIONS.  General: Alert and oriented, No acute distress.  Respiratory: Non-labored respirations  Psychiatric: Normal interaction, Coherent speech, Euthymic mood, Appropriate affect       Assessment:       ICD-10-CM ICD-9-CM   1. Hydrocephalus, unspecified type  G91.9 331.4   2. Prediabetes  R73.03 790.29        Plan:     1. Hydrocephalus, unspecified type   Pt had CT head done 1-30-23 showing  CT Head Without Contrast  Order: [id number]  Status: Final result     Visible to patient: Yes (not seen)     Next appt: 03/01/2023 at 11:30 AM in Urology (Andreea Villatoro, )     Dx: Dizziness; Hydrocephalus, unspecified...      0 Result Notes  Details    Reading Physician Reading Date Result Priority   Luis Blanco MD  [phone] 1/30/2023 Routine     Narrative & Impression  EXAMINATION:  CT HEAD WITHOUT CONTRAST     CLINICAL HISTORY:  57-year-old man for follow-up of hydrocephalus.     TECHNIQUE:  Thin slice noncontrast CT imaging was performed from below the skull base through the skull vertex per routine protocol and with coronal and sagittal reformatted images generated from the axial data set.  Automatic dose modulation and/or weight based mA/kv utilized to achieve as low as reasonable radiation dose.     COMPARISON:  Head CT without contrast 08/05/2020.     FINDINGS:  There are bilateral parietal approach ventriculoperitoneal shunt catheters remaining unchanged in position.  The ventricles are similar in morphology to the prior CT exam with diffusely dilated lateral ventricles and normal size 3rd and 4th ventricles.  No acute parenchymal abnormality is identified.  There is no intracranial hemorrhage or abnormal fluid collection.  No posterior fossa pathology is identified.  There is no significant extracranial soft tissue abnormality.  There is no acute osseous abnormality or osseous lesion.  Chronic thick periosteal reaction along the inner table of the left calvarium is unchanged in the interval.  The paranasal sinuses are well developed with mild, thin mucosal thickening at the bilateral ethmoid sinuses and several mucous retention cysts in the bilateral maxillary sinuses.  No paranasal sinus fluid.  The well pneumatized bilateral mastoids and middle ears are clear.     Impression:     Chronic dilation of the lateral ventricles which is unchanged since 08/05/2020 with bilateral ventriculoperitoneal shunts remaining in place and with no acute pathology at the head or significant change since 08/05/2020.     Mild chronic sinusitis at the ethmoids and maxillary sinuses.        Electronically signed by: Luis Blanco  Date:                                             01/30/2023  Time:                                           10:34           Exam Ended: 01/30/23 10:24 Last Resulted: 01/30/23 10:34           2. Prediabetes  Pt requesting to have his A1c checked again at next visit due to him being concerned concerning his sugar level was elevated on last labs. Will add A1c to be done prior to next appt in April. Pt verbalized understanding.        Orders Placed This Encounter   Procedures    Hemoglobin A1C     Standing Status:   Future     Standing Expiration Date:   4/26/2024          Follow up for Keep f/u appt 4-26-23 as scheduled with labs 1 week prior to appt.. In addition to their scheduled follow up, the patient has also been instructed to follow up on as needed basis.       Video Time Documentation:  Spent 10 minutes with patient face to face discussed health concerns. More than 50% of this time was spent in counseling and coordination of care.Established Patient - Audio Only Telehealth Visit     The patient location is: home  The chief complaint leading to consultation is: CT results  Visit type: Virtual visit with audio only (telephone)  Total time spent with patient: 10 minutes       The reason for the audio only service rather than synchronous audio and video virtual visit was related to technical difficulties or patient preference/necessity.     Each patient to whom I provide medical services by telemedicine is:  (1) informed of the relationship between the physician and patient and the respective role of any other health care provider with respect to management of the patient; and (2) notified that they may decline to receive medical services by telemedicine and may withdraw from such care at any time. Patient verbally consented to receive this service via voice-only telephone call.       HPI: see above     Assessment and plan:  see above                        This service was not originating from a related E/M service  provided within the previous 7 days nor will  to an E/M service or procedure within the next 24 hours or my soonest available appointment.  Prevailing standard of care was able to be met in this audio-only visit.

## 2023-02-28 ENCOUNTER — TELEPHONE (OUTPATIENT)
Dept: UROLOGY | Facility: CLINIC | Age: 58
End: 2023-02-28
Payer: MEDICARE

## 2023-03-01 ENCOUNTER — OFFICE VISIT (OUTPATIENT)
Dept: UROLOGY | Facility: CLINIC | Age: 58
End: 2023-03-01
Payer: MEDICARE

## 2023-03-01 VITALS
TEMPERATURE: 98 F | DIASTOLIC BLOOD PRESSURE: 78 MMHG | SYSTOLIC BLOOD PRESSURE: 120 MMHG | WEIGHT: 189 LBS | RESPIRATION RATE: 19 BRPM | OXYGEN SATURATION: 99 % | BODY MASS INDEX: 31.49 KG/M2 | HEIGHT: 65 IN | HEART RATE: 63 BPM

## 2023-03-01 DIAGNOSIS — N13.8 BPH WITH OBSTRUCTION/LOWER URINARY TRACT SYMPTOMS: Primary | ICD-10-CM

## 2023-03-01 DIAGNOSIS — R35.0 URINARY FREQUENCY: ICD-10-CM

## 2023-03-01 DIAGNOSIS — N40.1 BPH WITH OBSTRUCTION/LOWER URINARY TRACT SYMPTOMS: Primary | ICD-10-CM

## 2023-03-01 PROCEDURE — 99213 OFFICE O/P EST LOW 20 MIN: CPT | Mod: S$PBB,,, | Performed by: UROLOGY

## 2023-03-01 PROCEDURE — 99213 OFFICE O/P EST LOW 20 MIN: CPT | Mod: PBBFAC | Performed by: UROLOGY

## 2023-03-01 PROCEDURE — 99213 PR OFFICE/OUTPT VISIT, EST, LEVL III, 20-29 MIN: ICD-10-PCS | Mod: S$PBB,,, | Performed by: UROLOGY

## 2023-03-01 RX ORDER — TAMSULOSIN HYDROCHLORIDE 0.4 MG/1
1 CAPSULE ORAL DAILY
Qty: 90 CAPSULE | Refills: 3 | Status: SHIPPED | OUTPATIENT
Start: 2023-03-01 | End: 2024-03-27 | Stop reason: SDUPTHER

## 2023-03-01 NOTE — PROGRESS NOTES
Patient sent by Dr. Flores. Patient instructed to RTC in 6 months with bladder scan. Bladder scan was done on patient today, which came out to be 134 mL.

## 2023-03-01 NOTE — PROGRESS NOTES
CC:  Follow-up    HPI:  Jude Kaur Jr. is a 57 y.o. male seen for follow-up of medication trial.  He was having urinary frequency and nocturia up to five times.  I placed him on oxybutynin in since then he has had decreased daytime urinary frequency and nocturia has been one to two times usually and occasionally three times.  He is pleased with these results.  He is on tamsulosin for BPH and requests a refill today.    Bladder scan residual:  134 ml  ROS:  All systems reviewed and are negative except as documented in HPI and/or Assessment and Plan.     Patient Active Problem List:     Patient Active Problem List   Diagnosis    Anemia    Controlled type 2 diabetes mellitus without complication, without long-term current use of insulin    Hematuria    History of fall    Hydrocephalus    Loss of balance    Obesity    Pain in head     (ventriculoperitoneal) shunt status        Past Medical History:  History reviewed. No pertinent past medical history.     Past Surgical History:  Past Surgical History:   Procedure Laterality Date    COLONOSCOPY  05/28/2014    Gonsalo Gonzalez MD        Family History:  Family History   Problem Relation Age of Onset    Diabetes Mother     Heart disease Mother     Hypertension Mother     Diabetes Father         Social History:  Social History     Socioeconomic History    Marital status:    Tobacco Use    Smoking status: Never     Passive exposure: Past    Smokeless tobacco: Never   Substance and Sexual Activity    Alcohol use: Never    Drug use: Never    Sexual activity: Yes     Birth control/protection: None        Allergies:  Review of patient's allergies indicates:  No Known Allergies     Objective:  Vitals:    03/01/23 1109   BP: 120/78   Pulse: 63   Resp: 19   Temp: 97.9 °F (36.6 °C)     General:  Well developed, well nourished adult male in no acute distress  Abdomen: Soft, nontender, no masses  Extremities:  No clubbing, cyanosis, or edema  Neurologic:  Grossly  intact  Musculoskeletal:  Normal tone  ymmetrical, no nodules  Rectal:  Normal rectal tone    Assessment:  1. BPH with obstruction/lower urinary tract symptoms  - tamsulosin (FLOMAX) 0.4 mg Cap; Take 1 capsule (0.4 mg total) by mouth once daily.  Dispense: 90 capsule; Refill: 3    2. Urinary frequency     Plan:  Continue tamsulosin.  Medication was refilled today.  Continue oxybutynin.    Follow-up:  Six months for bladder scan.

## 2023-03-24 ENCOUNTER — LAB VISIT (OUTPATIENT)
Dept: LAB | Facility: HOSPITAL | Age: 58
End: 2023-03-24
Attending: NURSE PRACTITIONER
Payer: MEDICARE

## 2023-03-24 DIAGNOSIS — E11.9 CONTROLLED TYPE 2 DIABETES MELLITUS WITHOUT COMPLICATION, WITHOUT LONG-TERM CURRENT USE OF INSULIN: ICD-10-CM

## 2023-03-24 DIAGNOSIS — D64.9 ANEMIA, UNSPECIFIED TYPE: ICD-10-CM

## 2023-03-24 DIAGNOSIS — R73.03 PREDIABETES: ICD-10-CM

## 2023-03-24 LAB
ANION GAP SERPL CALC-SCNC: 5 MEQ/L
BASOPHILS # BLD AUTO: 0.03 X10(3)/MCL (ref 0–0.2)
BASOPHILS NFR BLD AUTO: 0.6 %
BUN SERPL-MCNC: 21.3 MG/DL (ref 8.4–25.7)
CALCIUM SERPL-MCNC: 8.9 MG/DL (ref 8.4–10.2)
CHLORIDE SERPL-SCNC: 114 MMOL/L (ref 98–107)
CHOLEST SERPL-MCNC: 144 MG/DL
CHOLEST/HDLC SERPL: 5 {RATIO} (ref 0–5)
CO2 SERPL-SCNC: 25 MMOL/L (ref 22–29)
CREAT SERPL-MCNC: 0.91 MG/DL (ref 0.73–1.18)
CREAT/UREA NIT SERPL: 23
EOSINOPHIL # BLD AUTO: 0.04 X10(3)/MCL (ref 0–0.9)
EOSINOPHIL NFR BLD AUTO: 0.8 %
ERYTHROCYTE [DISTWIDTH] IN BLOOD BY AUTOMATED COUNT: 14.6 % (ref 11.5–17)
EST. AVERAGE GLUCOSE BLD GHB EST-MCNC: 119.8 MG/DL
GFR SERPLBLD CREATININE-BSD FMLA CKD-EPI: >60 MLS/MIN/1.73/M2
GLUCOSE SERPL-MCNC: 110 MG/DL (ref 74–100)
HBA1C MFR BLD: 5.8 %
HCT VFR BLD AUTO: 38.8 % (ref 42–52)
HDLC SERPL-MCNC: 32 MG/DL (ref 35–60)
HGB BLD-MCNC: 11.9 G/DL (ref 14–18)
IMM GRANULOCYTES # BLD AUTO: 0.01 X10(3)/MCL (ref 0–0.04)
IMM GRANULOCYTES NFR BLD AUTO: 0.2 %
IRON SATN MFR SERPL: 34 % (ref 20–50)
IRON SERPL-MCNC: 81 UG/DL (ref 65–175)
LDLC SERPL CALC-MCNC: 92 MG/DL (ref 50–140)
LYMPHOCYTES # BLD AUTO: 2.46 X10(3)/MCL (ref 0.6–4.6)
LYMPHOCYTES NFR BLD AUTO: 48.2 %
MCH RBC QN AUTO: 23.4 PG (ref 27–31)
MCHC RBC AUTO-ENTMCNC: 30.7 G/DL (ref 33–36)
MCV RBC AUTO: 76.2 FL (ref 80–94)
MONOCYTES # BLD AUTO: 0.51 X10(3)/MCL (ref 0.1–1.3)
MONOCYTES NFR BLD AUTO: 10 %
NEUTROPHILS # BLD AUTO: 2.05 X10(3)/MCL (ref 2.1–9.2)
NEUTROPHILS NFR BLD AUTO: 40.2 %
NRBC BLD AUTO-RTO: 0 %
PLATELET # BLD AUTO: 227 X10(3)/MCL (ref 130–400)
PMV BLD AUTO: 10.3 FL (ref 7.4–10.4)
POTASSIUM SERPL-SCNC: 3.9 MMOL/L (ref 3.5–5.1)
RBC # BLD AUTO: 5.09 X10(6)/MCL (ref 4.7–6.1)
SODIUM SERPL-SCNC: 144 MMOL/L (ref 136–145)
TIBC SERPL-MCNC: 160 UG/DL (ref 69–240)
TIBC SERPL-MCNC: 241 UG/DL (ref 250–450)
TRANSFERRIN SERPL-MCNC: 196 MG/DL (ref 174–364)
TRIGL SERPL-MCNC: 99 MG/DL (ref 34–140)
TSH SERPL-ACNC: 3.25 UIU/ML (ref 0.35–4.94)
VLDLC SERPL CALC-MCNC: 20 MG/DL
WBC # SPEC AUTO: 5.1 X10(3)/MCL (ref 4.5–11.5)

## 2023-03-24 PROCEDURE — 84443 ASSAY THYROID STIM HORMONE: CPT

## 2023-03-24 PROCEDURE — 83550 IRON BINDING TEST: CPT

## 2023-03-24 PROCEDURE — 80048 BASIC METABOLIC PNL TOTAL CA: CPT

## 2023-03-24 PROCEDURE — 36415 COLL VENOUS BLD VENIPUNCTURE: CPT

## 2023-03-24 PROCEDURE — 80061 LIPID PANEL: CPT

## 2023-03-24 PROCEDURE — 85025 COMPLETE CBC W/AUTO DIFF WBC: CPT

## 2023-03-24 PROCEDURE — 83036 HEMOGLOBIN GLYCOSYLATED A1C: CPT

## 2023-04-26 ENCOUNTER — OFFICE VISIT (OUTPATIENT)
Dept: INTERNAL MEDICINE | Facility: CLINIC | Age: 58
End: 2023-04-26
Payer: MEDICARE

## 2023-04-26 VITALS
BODY MASS INDEX: 31.82 KG/M2 | HEART RATE: 62 BPM | DIASTOLIC BLOOD PRESSURE: 67 MMHG | RESPIRATION RATE: 18 BRPM | HEIGHT: 65 IN | WEIGHT: 191 LBS | TEMPERATURE: 98 F | SYSTOLIC BLOOD PRESSURE: 100 MMHG

## 2023-04-26 DIAGNOSIS — D64.9 ANEMIA, UNSPECIFIED TYPE: Primary | ICD-10-CM

## 2023-04-26 DIAGNOSIS — G91.9 HYDROCEPHALUS, UNSPECIFIED TYPE: ICD-10-CM

## 2023-04-26 DIAGNOSIS — Z91.81 HISTORY OF FALL: ICD-10-CM

## 2023-04-26 DIAGNOSIS — E11.9 CONTROLLED TYPE 2 DIABETES MELLITUS WITHOUT COMPLICATION, WITHOUT LONG-TERM CURRENT USE OF INSULIN: ICD-10-CM

## 2023-04-26 PROCEDURE — 99214 PR OFFICE/OUTPT VISIT, EST, LEVL IV, 30-39 MIN: ICD-10-PCS | Mod: S$PBB,,, | Performed by: NURSE PRACTITIONER

## 2023-04-26 PROCEDURE — 99213 OFFICE O/P EST LOW 20 MIN: CPT | Mod: PBBFAC | Performed by: NURSE PRACTITIONER

## 2023-04-26 PROCEDURE — 99214 OFFICE O/P EST MOD 30 MIN: CPT | Mod: S$PBB,,, | Performed by: NURSE PRACTITIONER

## 2023-04-26 NOTE — PROGRESS NOTES
Patient ID: [id number]     Chief Complaint: LAB RESULTS        HPI:     HPI      Jude Kaur Jr. is a 57 y.o. male here today for a follow up.         No past medical history on file.     Past Surgical History:   Procedure Laterality Date    COLONOSCOPY  05/28/2014    Gonsalo Gonzalez MD       Review of patient's allergies indicates:  No Known Allergies    Current Outpatient Medications   Medication Instructions    mirabegron (MYRBETRIQ) 50 mg, Oral, Daily    oxybutynin (DITROPAN-XL) 10 mg, Oral, Daily    tamsulosin (FLOMAX) 0.4 mg, Oral, Daily       Social History     Socioeconomic History    Marital status:    Tobacco Use    Smoking status: Never     Passive exposure: Past    Smokeless tobacco: Never   Substance and Sexual Activity    Alcohol use: Never    Drug use: Never    Sexual activity: Yes     Birth control/protection: None     Social Determinants of Health     Financial Resource Strain: Low Risk     Difficulty of Paying Living Expenses: Not hard at all   Food Insecurity: No Food Insecurity    Worried About Running Out of Food in the Last Year: Never true    Ran Out of Food in the Last Year: Never true   Transportation Needs: No Transportation Needs    Lack of Transportation (Medical): No    Lack of Transportation (Non-Medical): No   Physical Activity: Unknown    Days of Exercise per Week: 0 days   Stress: No Stress Concern Present    Feeling of Stress : Not at all   Social Connections: Socially Isolated    Frequency of Communication with Friends and Family: Once a week    Frequency of Social Gatherings with Friends and Family: Never    Attends Congregational Services: Never    Active Member of Clubs or Organizations: No    Attends Club or Organization Meetings: Never    Marital Status:    Housing Stability: Unknown    Unable to Pay for Housing in the Last Year: No    Unstable Housing in the Last Year: No        Family History   Problem Relation Age of Onset    Diabetes Mother     Heart  "disease Mother     Hypertension Mother     Diabetes Father         Patient Care Team:  GULSHAN Canchola as PCP - General (Family Medicine)     Subjective:     Review of Systems     See HPI for details    Constitutional: Denies Change in appetite. Denies Chills. Denies Fever. Denies Night sweats.  Eye: Denies Blurred vision. Denies Discharge. Denies Eye pain.  ENT: Denies Decreased hearing. Denies Sore throat. Denies Swollen glands.  Respiratory: Denies Cough. Denies Shortness of breath. Denies Shortness of breath with exertion. Denies Wheezing.  Cardiovascular: DeniesChest pain at rest. Denies Chest pain with exertion. Denies Irregular heartbeat. Denies Palpitations. Denies Edema.  Gastrointestinal: Denies Abdominal pain. DeniesDiarrhea. Denies Nausea. Denies Vomiting. Denies Hematemesis or Hematochezia.  Genitourinary: Denies Dysuria. Denies Urinary frequency. Denies Urinary urgency. Denies Blood in urine.  Endocrine: Denies Cold intolerance. Denies Excessive thirst. Denies Heat intolerance. Denies Weight loss. Denies Weight gain.  Musculoskeletal: Denies Painful joints. Denies Weakness.  Integumentary: Denies Rash. Denies Itching. Denies Dry skin.  Neurologic: Denies Dizziness. Denies Fainting. Denies Headache.  Psychiatric: Denies Depression. Denies Anxiety. Denies Suicidal/Homicidal ideations.    All Other ROS: Negative except as stated in HPI.       Objective:     Visit Vitals  /67 (BP Location: Right arm, Patient Position: Sitting, BP Method: Large (Automatic))   Pulse 62   Temp 97.7 °F (36.5 °C) (Oral)   Resp 18   Ht 5' 5" (1.651 m)   Wt 86.6 kg (191 lb)   BMI 31.78 kg/m²       Physical Exam    General: Alert and oriented, No acute distress.  Head: Normocephalic, Atraumatic.  Eye: Pupils are equal, round and reactive to light, Extraocular movements are intact, Sclera non-icteric.  Ears/Nose/Throat: Normal, Mucosa moist,Clear.  Neck/Thyroid: Supple, Non-tender, No carotid bruit, No lymphadenopathy, " No JVD, Full range of motion.  Respiratory: Clear to auscultation bilaterally; No wheezes, rales or rhonchi,Non-labored respirations, Symmetrical chest wall expansion.  Cardiovascular: Regular rate and rhythm, S1/S2 normal, No murmurs, rubs or gallops.  Gastrointestinal: Soft, Non-tender, Non-distended, Normal bowel sounds, No palpable organomegaly.  Musculoskeletal: Normal range of motion.  Integumentary: Warm, Dry, Intact, No suspicious lesions or rashes.  Extremities: No clubbing, cyanosis or edema  Neurologic: No focal deficits, Cranial Nerves II-XII are grossly intact, Motor strength normal upper and lower extremities, Sensory exam intact.  Psychiatric: Normal interaction, Coherent speech, Euthymic mood, Appropriate affect       Labs Reviewed:     Chemistry:  Lab Results   Component Value Date     03/24/2023    K 3.9 03/24/2023    CHLORIDE 114 (H) 03/24/2023    BUN 21.3 03/24/2023    CREATININE 0.91 03/24/2023    EGFRNORACEVR >60 03/24/2023    GLUCOSE 110 (H) 03/24/2023    CALCIUM 8.9 03/24/2023    ALKPHOS 92 01/17/2023    LABPROT 7.1 01/17/2023    ALBUMIN 3.8 01/17/2023    BILIDIR 0.2 03/31/2022    IBILI 0.20 03/31/2022    AST 22 01/17/2023    ALT 36 01/17/2023    MG 2.00 03/31/2022        Lab Results   Component Value Date    HGBA1C 5.8 03/24/2023        Hematology:  Lab Results   Component Value Date    WBC 5.1 03/24/2023    HGB 11.9 (L) 03/24/2023    HCT 38.8 (L) 03/24/2023     03/24/2023       Lipid Panel:  Lab Results   Component Value Date    CHOL 144 03/24/2023    HDL 32 (L) 03/24/2023    LDL 92.00 03/24/2023    TRIG 99 03/24/2023    TOTALCHOLEST 5 03/24/2023        Urine:  Lab Results   Component Value Date    COLORUA Colorless (A) 01/17/2023    APPEARANCEUA Clear 01/17/2023    SGUA 1.012 01/17/2023    PHUA 5.5 01/17/2023    PROTEINUA Negative 01/17/2023    GLUCOSEUA Normal 01/17/2023    KETONESUA Negative 01/17/2023    BLOODUA Trace (A) 01/17/2023    NITRITESUA Negative 01/17/2023     LEUKOCYTESUR Negative 01/17/2023    RBCUA 0-5 01/17/2023    WBCUA 0-5 01/17/2023    BACTERIA None Seen 01/17/2023    SQEPUA Trace (A) 01/17/2023    HYALINECASTS None Seen 01/17/2023    CREATRANDUR 203.3 (H) 10/26/2022        Assessment:       ICD-10-CM ICD-9-CM   1. Anemia, unspecified type  D64.9 285.9   2. Controlled type 2 diabetes mellitus without complication, without long-term current use of insulin  E11.9 250.00   3. History of fall  Z91.81 V15.88   4. Hydrocephalus, unspecified type  G91.9 331.4        Plan:     1. Anemia, unspecified type  CBC in 3 months.    2. Controlled type 2 diabetes mellitus without complication, without long-term current use of insulin  A1c 5.8. ADA diet and exercise. Controlled with diet and exercise. Urine microalbumin 10-26-22. DM foot done today. DM eye done today. Pt refused statin therapy.   Protective Sensation (w/ 10 gram monofilament):  Right: Intact  Left: Intact    Visual Inspection:  Normal -  Bilateral    Pedal Pulses:   Right: Present  Left: Present    Posterior Tibialis Pulses:   Right:Present  Left: Present     3. History of fall  Encouraged fall precautions. Education provided.     4. Hydrocephalus, unspecified type   Pt had CT head done 1-30-23 showing  CT Head Without Contrast  Order: [id number]  Status: Final result     Visible to patient: Yes (not seen)     Next appt: 03/01/2023 at 11:30 AM in Urology (Andreea Villatoro DO)     Dx: Dizziness; Hydrocephalus, unspecified...     0 Result Notes  Details     Reading Physician Reading Date Result Priority   Luis Blanco MD  [phone] 1/30/2023 Routine      Narrative & Impression  EXAMINATION:  CT HEAD WITHOUT CONTRAST     CLINICAL HISTORY:  57-year-old man for follow-up of hydrocephalus.     TECHNIQUE:  Thin slice noncontrast CT imaging was performed from below the skull base through the skull vertex per routine protocol and with coronal and sagittal reformatted images generated from the axial data set.  Automatic  dose modulation and/or weight based mA/kv utilized to achieve as low as reasonable radiation dose.     COMPARISON:  Head CT without contrast 08/05/2020.     FINDINGS:  There are bilateral parietal approach ventriculoperitoneal shunt catheters remaining unchanged in position.  The ventricles are similar in morphology to the prior CT exam with diffusely dilated lateral ventricles and normal size 3rd and 4th ventricles.  No acute parenchymal abnormality is identified.  There is no intracranial hemorrhage or abnormal fluid collection.  No posterior fossa pathology is identified.  There is no significant extracranial soft tissue abnormality.  There is no acute osseous abnormality or osseous lesion.  Chronic thick periosteal reaction along the inner table of the left calvarium is unchanged in the interval.  The paranasal sinuses are well developed with mild, thin mucosal thickening at the bilateral ethmoid sinuses and several mucous retention cysts in the bilateral maxillary sinuses.  No paranasal sinus fluid.  The well pneumatized bilateral mastoids and middle ears are clear.     Impression:     Chronic dilation of the lateral ventricles which is unchanged since 08/05/2020 with bilateral ventriculoperitoneal shunts remaining in place and with no acute pathology at the head or significant change since 08/05/2020.     Mild chronic sinusitis at the ethmoids and maxillary sinuses.        Electronically signed by: Luis Blanco  Date:                                            01/30/2023  Time:                                           10:34             Exam Ended: 01/30/23 10:24 Last Resulted: 01/30/23 10:34                  5. Well adult  Labs in 3 months. UTD PSA. Pt was referred to [facility] for colonoscopy and received message pt not due till May 2024.     Follow up in about 3 months (around 7/26/2023) for with labs 1 week prior to appt. . In addition to their scheduled follow up, the patient has also been  instructed to follow up on as needed basis.     Future Appointments   Date Time Provider Department Center   9/1/2023 11:45 AM Andreea Florse DO [facility] GULSHAN Silva

## 2023-06-01 DIAGNOSIS — R39.15 URINARY URGENCY: Primary | ICD-10-CM

## 2023-06-05 ENCOUNTER — LAB VISIT (OUTPATIENT)
Dept: LAB | Facility: HOSPITAL | Age: 58
End: 2023-06-05
Attending: UROLOGY
Payer: MEDICARE

## 2023-06-05 DIAGNOSIS — R39.15 URINARY URGENCY: ICD-10-CM

## 2023-06-05 PROCEDURE — 87088 URINE BACTERIA CULTURE: CPT

## 2023-06-07 ENCOUNTER — TELEPHONE (OUTPATIENT)
Dept: UROLOGY | Facility: CLINIC | Age: 58
End: 2023-06-07
Payer: MEDICARE

## 2023-06-07 LAB — BACTERIA UR CULT: NO GROWTH

## 2023-06-07 NOTE — TELEPHONE ENCOUNTER
Informed pt of results of culture. Voiced understanding. Shared with pt that if he has any additonal questions to let staff at clinic know.

## 2023-06-19 ENCOUNTER — PATIENT MESSAGE (OUTPATIENT)
Dept: ADMINISTRATIVE | Facility: HOSPITAL | Age: 58
End: 2023-06-19
Payer: MEDICARE

## 2023-10-11 ENCOUNTER — HOSPITAL ENCOUNTER (EMERGENCY)
Facility: HOSPITAL | Age: 58
Discharge: HOME | End: 2023-10-11
Attending: STUDENT IN AN ORGANIZED HEALTH CARE EDUCATION/TRAINING PROGRAM
Payer: MEDICARE

## 2023-10-11 VITALS
WEIGHT: 160 LBS | HEART RATE: 45 BPM | OXYGEN SATURATION: 98 % | SYSTOLIC BLOOD PRESSURE: 125 MMHG | DIASTOLIC BLOOD PRESSURE: 73 MMHG | BODY MASS INDEX: 26.66 KG/M2 | HEIGHT: 65 IN | TEMPERATURE: 98 F | RESPIRATION RATE: 10 BRPM

## 2023-10-11 DIAGNOSIS — R07.89 CHEST WALL PAIN: ICD-10-CM

## 2023-10-11 DIAGNOSIS — R07.89 ATYPICAL CHEST PAIN: Primary | ICD-10-CM

## 2023-10-11 LAB
ALBUMIN SERPL-MCNC: 3.7 G/DL (ref 3.5–5)
ALBUMIN/GLOB SERPL: 1.2 RATIO (ref 1.1–2)
ALP SERPL-CCNC: 81 UNIT/L (ref 40–150)
ALT SERPL-CCNC: 43 UNIT/L (ref 0–55)
AST SERPL-CCNC: 27 UNIT/L (ref 5–34)
BASOPHILS # BLD AUTO: 0.02 X10(3)/MCL
BASOPHILS NFR BLD AUTO: 0.3 %
BILIRUB SERPL-MCNC: 0.2 MG/DL
BNP BLD-MCNC: 24.6 PG/ML
BUN SERPL-MCNC: 16.7 MG/DL (ref 8.4–25.7)
CALCIUM SERPL-MCNC: 8.8 MG/DL (ref 8.4–10.2)
CHLORIDE SERPL-SCNC: 113 MMOL/L (ref 98–107)
CO2 SERPL-SCNC: 24 MMOL/L (ref 22–29)
CREAT SERPL-MCNC: 0.96 MG/DL (ref 0.73–1.18)
D DIMER PPP IA.FEU-MCNC: 2.23 UG/ML FEU (ref 0–0.5)
EOSINOPHIL # BLD AUTO: 0.06 X10(3)/MCL (ref 0–0.9)
EOSINOPHIL NFR BLD AUTO: 1 %
ERYTHROCYTE [DISTWIDTH] IN BLOOD BY AUTOMATED COUNT: 15 % (ref 11.5–17)
FLUAV AG UPPER RESP QL IA.RAPID: NOT DETECTED
FLUBV AG UPPER RESP QL IA.RAPID: NOT DETECTED
GFR SERPLBLD CREATININE-BSD FMLA CKD-EPI: >60 MLS/MIN/1.73/M2
GLOBULIN SER-MCNC: 3.1 GM/DL (ref 2.4–3.5)
GLUCOSE SERPL-MCNC: 154 MG/DL (ref 74–100)
HCT VFR BLD AUTO: 37.7 % (ref 42–52)
HGB BLD-MCNC: 11.7 G/DL (ref 14–18)
IMM GRANULOCYTES # BLD AUTO: 0.01 X10(3)/MCL (ref 0–0.04)
IMM GRANULOCYTES NFR BLD AUTO: 0.2 %
INR PPP: 1.1
LYMPHOCYTES # BLD AUTO: 3.15 X10(3)/MCL (ref 0.6–4.6)
LYMPHOCYTES NFR BLD AUTO: 52.5 %
MAGNESIUM SERPL-MCNC: 1.9 MG/DL (ref 1.6–2.6)
MCH RBC QN AUTO: 23.4 PG (ref 27–31)
MCHC RBC AUTO-ENTMCNC: 31 G/DL (ref 33–36)
MCV RBC AUTO: 75.6 FL (ref 80–94)
MONOCYTES # BLD AUTO: 0.59 X10(3)/MCL (ref 0.1–1.3)
MONOCYTES NFR BLD AUTO: 9.8 %
NEUTROPHILS # BLD AUTO: 2.17 X10(3)/MCL (ref 2.1–9.2)
NEUTROPHILS NFR BLD AUTO: 36.2 %
NRBC BLD AUTO-RTO: 0 %
PLATELET # BLD AUTO: 218 X10(3)/MCL (ref 130–400)
PMV BLD AUTO: 10.7 FL (ref 7.4–10.4)
POTASSIUM SERPL-SCNC: 3.5 MMOL/L (ref 3.5–5.1)
PROT SERPL-MCNC: 6.8 GM/DL (ref 6.4–8.3)
PROTHROMBIN TIME: 13.6 SECONDS (ref 12.5–14.5)
RBC # BLD AUTO: 4.99 X10(6)/MCL (ref 4.7–6.1)
SARS-COV-2 RNA RESP QL NAA+PROBE: NOT DETECTED
SODIUM SERPL-SCNC: 144 MMOL/L (ref 136–145)
TROPONIN I SERPL-MCNC: <0.01 NG/ML (ref 0–0.04)
TROPONIN I SERPL-MCNC: <0.01 NG/ML (ref 0–0.04)
WBC # SPEC AUTO: 6 X10(3)/MCL (ref 4.5–11.5)

## 2023-10-11 PROCEDURE — 0240U COVID/FLU A&B PCR: CPT | Performed by: STUDENT IN AN ORGANIZED HEALTH CARE EDUCATION/TRAINING PROGRAM

## 2023-10-11 PROCEDURE — 93010 EKG 12-LEAD: ICD-10-PCS | Mod: ,,, | Performed by: INTERNAL MEDICINE

## 2023-10-11 PROCEDURE — 99285 EMERGENCY DEPT VISIT HI MDM: CPT | Mod: 25

## 2023-10-11 PROCEDURE — 80053 COMPREHEN METABOLIC PANEL: CPT | Performed by: STUDENT IN AN ORGANIZED HEALTH CARE EDUCATION/TRAINING PROGRAM

## 2023-10-11 PROCEDURE — 96374 THER/PROPH/DIAG INJ IV PUSH: CPT

## 2023-10-11 PROCEDURE — 85025 COMPLETE CBC W/AUTO DIFF WBC: CPT | Performed by: STUDENT IN AN ORGANIZED HEALTH CARE EDUCATION/TRAINING PROGRAM

## 2023-10-11 PROCEDURE — 85379 FIBRIN DEGRADATION QUANT: CPT | Performed by: STUDENT IN AN ORGANIZED HEALTH CARE EDUCATION/TRAINING PROGRAM

## 2023-10-11 PROCEDURE — 93005 ELECTROCARDIOGRAM TRACING: CPT

## 2023-10-11 PROCEDURE — 83880 ASSAY OF NATRIURETIC PEPTIDE: CPT | Performed by: STUDENT IN AN ORGANIZED HEALTH CARE EDUCATION/TRAINING PROGRAM

## 2023-10-11 PROCEDURE — 84484 ASSAY OF TROPONIN QUANT: CPT | Mod: 91 | Performed by: STUDENT IN AN ORGANIZED HEALTH CARE EDUCATION/TRAINING PROGRAM

## 2023-10-11 PROCEDURE — 83735 ASSAY OF MAGNESIUM: CPT | Performed by: STUDENT IN AN ORGANIZED HEALTH CARE EDUCATION/TRAINING PROGRAM

## 2023-10-11 PROCEDURE — 63600175 PHARM REV CODE 636 W HCPCS: Performed by: STUDENT IN AN ORGANIZED HEALTH CARE EDUCATION/TRAINING PROGRAM

## 2023-10-11 PROCEDURE — 93010 ELECTROCARDIOGRAM REPORT: CPT | Mod: ,,, | Performed by: INTERNAL MEDICINE

## 2023-10-11 PROCEDURE — 85610 PROTHROMBIN TIME: CPT | Performed by: STUDENT IN AN ORGANIZED HEALTH CARE EDUCATION/TRAINING PROGRAM

## 2023-10-11 PROCEDURE — 25500020 PHARM REV CODE 255: Performed by: STUDENT IN AN ORGANIZED HEALTH CARE EDUCATION/TRAINING PROGRAM

## 2023-10-11 PROCEDURE — 84484 ASSAY OF TROPONIN QUANT: CPT | Performed by: STUDENT IN AN ORGANIZED HEALTH CARE EDUCATION/TRAINING PROGRAM

## 2023-10-11 RX ORDER — METHOCARBAMOL 500 MG/1
1000 TABLET, FILM COATED ORAL 3 TIMES DAILY
Qty: 30 TABLET | Refills: 0 | Status: SHIPPED | OUTPATIENT
Start: 2023-10-11 | End: 2023-10-16

## 2023-10-11 RX ORDER — KETOROLAC TROMETHAMINE 30 MG/ML
15 INJECTION, SOLUTION INTRAMUSCULAR; INTRAVENOUS
Status: COMPLETED | OUTPATIENT
Start: 2023-10-11 | End: 2023-10-11

## 2023-10-11 RX ORDER — ONDANSETRON 4 MG/1
4 TABLET, ORALLY DISINTEGRATING ORAL EVERY 6 HOURS PRN
Qty: 12 TABLET | Refills: 0 | Status: SHIPPED | OUTPATIENT
Start: 2023-10-11

## 2023-10-11 RX ORDER — IBUPROFEN 600 MG/1
600 TABLET ORAL EVERY 6 HOURS PRN
Qty: 20 TABLET | Refills: 0 | Status: SHIPPED | OUTPATIENT
Start: 2023-10-11

## 2023-10-11 RX ADMIN — IOPAMIDOL 100 ML: 755 INJECTION, SOLUTION INTRAVENOUS at 08:10

## 2023-10-11 RX ADMIN — SODIUM CHLORIDE, POTASSIUM CHLORIDE, SODIUM LACTATE AND CALCIUM CHLORIDE 1000 ML: 600; 310; 30; 20 INJECTION, SOLUTION INTRAVENOUS at 06:10

## 2023-10-11 RX ADMIN — KETOROLAC TROMETHAMINE 15 MG: 30 INJECTION, SOLUTION INTRAMUSCULAR; INTRAVENOUS at 06:10

## 2023-10-11 NOTE — DISCHARGE INSTRUCTIONS

## 2023-10-11 NOTE — ED PROVIDER NOTES
Encounter Date: 10/11/2023    SCRIBE #1 NOTE: I, Brandon Arenasdonaldo, am scribing for, and in the presence of,  Harsha Kohli MD. I have scribed the entire note.       History     Chief Complaint   Patient presents with    Chest Pain     C/o sudden onset sternal chest pain woke him up at midnight, c/o pain worse with breathing. But also pain just sitting in chair. Denies cardiac history. Denies sob, fever, nausea, dizziness.      57 year old male presents to the ED for chest pain. Pt states he woke up at 0000 tonight to a sternal chest pain. Pt states this pain worsened while lying flat. Pt says he felt SOB and had to go sit in his recliner to catch his breath. Pt denies any cardiac hx. Pt says he feels slightly better at this time. Pt denies any nausea or vomiting.     The history is provided by the patient. No  was used.     Review of patient's allergies indicates:  No Known Allergies  No past medical history on file.  Past Surgical History:   Procedure Laterality Date    COLONOSCOPY  05/28/2014    Gonsalo Gonzalez MD     Family History   Problem Relation Age of Onset    Diabetes Mother     Heart disease Mother     Hypertension Mother     Diabetes Father      Social History     Tobacco Use    Smoking status: Never     Passive exposure: Past    Smokeless tobacco: Never   Substance Use Topics    Alcohol use: Never    Drug use: Never     Review of Systems   Constitutional:  Negative for chills and fever.   HENT:  Negative for drooling and sore throat.    Eyes:  Negative for pain and redness.   Respiratory:  Positive for shortness of breath. Negative for wheezing and stridor.    Cardiovascular:  Positive for chest pain. Negative for palpitations and leg swelling.   Gastrointestinal:  Negative for abdominal pain, nausea and vomiting.   Genitourinary:  Negative for dysuria and hematuria.   Musculoskeletal:  Negative for back pain, neck pain and neck stiffness.   Skin:  Negative for rash and wound.    Neurological:  Negative for weakness and numbness.   Hematological:  Does not bruise/bleed easily.       Physical Exam     Initial Vitals [10/11/23 0305]   BP Pulse Resp Temp SpO2   126/80 (!) 58 13 97.8 °F (36.6 °C) 98 %      MAP       --         Physical Exam    Nursing note and vitals reviewed.  Constitutional: He appears well-developed. He is not diaphoretic. No distress.   HENT:   Head: Normocephalic and atraumatic.   Nose: Nose normal.   Mouth/Throat: Oropharynx is clear and moist.    shunt in place.    Eyes: Conjunctivae and EOM are normal. Pupils are equal, round, and reactive to light.   Strabismus    Cardiovascular:  Normal rate and regular rhythm.           No murmur heard.  Pulmonary/Chest: Breath sounds normal. No respiratory distress. He has no wheezes. He has no rales.   Reproducible sternal chest wall pain   Abdominal: Abdomen is soft. He exhibits no distension. There is no abdominal tenderness.     Neurological: He is alert and oriented to person, place, and time. He has normal strength. No cranial nerve deficit.   Skin: Skin is warm. Capillary refill takes less than 2 seconds. No rash noted.         ED Course   Procedures  Labs Reviewed   COMPREHENSIVE METABOLIC PANEL - Abnormal; Notable for the following components:       Result Value    Chloride 113 (*)     Glucose Level 154 (*)     All other components within normal limits   CBC WITH DIFFERENTIAL - Abnormal; Notable for the following components:    Hgb 11.7 (*)     Hct 37.7 (*)     MCV 75.6 (*)     MCH 23.4 (*)     MCHC 31.0 (*)     MPV 10.7 (*)     All other components within normal limits   D DIMER, QUANTITATIVE - Abnormal; Notable for the following components:    D-Dimer 2.23 (*)     All other components within normal limits   B-TYPE NATRIURETIC PEPTIDE - Normal   TROPONIN I - Normal   PROTIME-INR - Normal   MAGNESIUM - Normal   TROPONIN I - Normal   COVID/FLU A&B PCR - Normal    Narrative:     The Xpert Xpress SARS-CoV-2/FLU/RSV plus is  a rapid, multiplexed real-time PCR test intended for the simultaneous qualitative detection and differentiation of SARS-CoV-2, Influenza A, Influenza B, and respiratory syncytial virus (RSV) viral RNA in either nasopharyngeal swab or nasal swab specimens.         CBC W/ AUTO DIFFERENTIAL    Narrative:     The following orders were created for panel order CBC auto differential.  Procedure                               Abnormality         Status                     ---------                               -----------         ------                     CBC with Differential[[id number]]       Abnormal            Final result                 Please view results for these tests on the individual orders.        ECG Results              EKG 12-lead (Final result)  Result time 10/11/23 20:47:01      Final result by Interface, Lab In [facility] (10/11/23 20:47:01)                   Narrative:    Test Reason : R07.9,    Vent. Rate : 058 BPM     Atrial Rate : 058 BPM     P-R Int : 178 ms          QRS Dur : 100 ms      QT Int : 428 ms       P-R-T Axes : 066 026 032 degrees     QTc Int : 420 ms    Sinus bradycardia with Premature atrial complexes  Otherwise normal ECG  No previous ECGs available  Confirmed by Simon Lopez MD ([id number]) on 10/11/2023 8:46:57 PM    Referred By:             Confirmed By:Simon Lopez MD                                     EKG 12-LEAD (Final result)  Result time 10/19/23 12:22:29      Final result by Unknown User (10/19/23 12:22:29)                                      Imaging Results              CTA Chest Non-Coronary (PE Studies) (Final result)  Result time 10/11/23 08:57:51      Final result by Manuela Guevara MD (10/11/23 08:57:51)                   Impression:      No pulmonary embolism identified.      Electronically signed by: Manuela Guevara  Date:    10/11/2023  Time:    08:57               Narrative:    EXAMINATION:  CTA CHEST NON CORONARY (PE STUDIES)    CLINICAL HISTORY:  Pulmonary  embolism (PE) suspected, positive D-dimer;    TECHNIQUE:  Helical-acquisition CT images were obtained prior to and following the intravenous administration of iodine-based contrast media.    The post-contrast images were timed to coincide with arterial opacification for purpose of CT angiography.    Multiplanar reconstructions, to include MIP and volume-rendered (3D) images, were accomplished by the CT technologist at a separate workstation and pushed to PACS for physician review.    Total DLP (mGy-cm) 290 (value may include radiation due to concomitantly performed CT imaging)    Automated tube current modulation and/or weight-based exposure dosing is utilized, when appropriate, to reach lowest reasonably achievable exposure rate.    COMPARISON:  None    FINDINGS:  The heart is normal in size.  There is no pericardial effusion.  The RV to LV ratio is less than 1.  There is no pulmonary embolism identified.    There is no focal consolidation.  There is no pleural effusion or pneumothorax.    There are no acute findings in the upper abdomen.    There is no destructive bone lesion.                                       X-Ray Chest 1 View (Final result)  Result time 10/11/23 06:29:00      Final result by Manuela Guevara MD (10/11/23 06:29:00)                   Impression:      No acute abnormality of the chest.      Electronically signed by: Manuela Guevara  Date:    10/11/2023  Time:    06:29               Narrative:    EXAMINATION:  XR CHEST 1 VIEW    CLINICAL HISTORY:  chest pain;    TECHNIQUE:  AP chest    COMPARISON:  Chest x-ray dated 03/31/2022    FINDINGS:  Shunt catheters course over the right hemithorax.  The heart is normal in size.  There is no focal airspace consolidation.  There is no pleural effusion or visible pneumothorax.                                       Medications   ketorolac injection 15 mg (15 mg Intravenous Given 10/11/23 0616)   lactated ringers bolus 1,000 mL (1,000 mLs Intravenous  New Bag 10/11/23 0630)   iopamidoL (ISOVUE-370) injection 100 mL (100 mLs Intravenous Given 10/11/23 7320)     Medical Decision Making  Amount and/or Complexity of Data Reviewed  Radiology: ordered. Decision-making details documented in ED Course.    Risk  Prescription drug management.    Differential diagnosis (includes but is not limited to):   ACS, arrhythmia, pneumothorax, PE, musculoskeletal chest wall pain, atypical chest pain, gastritis, GERD, PUD    MDM Narrative  57-year-old male presents for evaluation some sternal chest pain that started earlier this morning.  He does have reproducible chest pain to palpation.  Labs are pending.  D-dimer pending to rule out PE, will consider CTA if elevated.  Pain and nausea control ordered.  IV fluid rehydration ordered.  Telemetry monitor independently reviewed and shows a sinus bradycardia with heart rate in the 50s.    Update:  Labs reviewed.  D-dimer elevated, CTA ordered is negative for PE or other intrathoracic pathology.  EKG reviewed.  Troponin negative x2.  Patient reports significant improvement in his symptoms with medications given in the emergency department.  He is reproducible chest wall pain has resolved.  Patient is low risk by heart score and by ED ACS score.  Patient states he feels ready to be discharged home and he will follow-up with his cardiologist and his primary care physician for further evaluation of his symptoms.  Strict return precautions discussed and patient has verbalized understanding.  Prescriptions written.    Dispo:  Discharge    My independent radiology interpretation: as above  Point of care US (independently performed and interpreted):   Decision rules/clinical scoring:     Sepsis Perfusion Assessment:     Amount and/or Complexity of Data Reviewed  Independent historian: none   Summary of history:   External data reviewed: notes from previous admissions, notes from previous ED visits, and notes from clinic visits  Summary of data  reviewed:   Risk and benefits of testing: discussed   Labs: ordered and reviewed  Radiology: ordered and independent interpretation performed (see above or ED course)  ECG/medicine tests: ordered and independent interpretation performed (see above or ED course)  Discussion of management or test interpretation with external provider(s): none   Summary of discussion:     Risk  OTC medications  Prescription drug management   Parenteral controlled substances   Drug therapy requiring intense monitoring for toxicity   Decision regarding hospitalization  Shared decision making     Critical Care  none    Data Reviewed/Counseling: I have personally reviewed the patient's vital signs, nursing notes, and other relevant tests, information, and imaging. I had a detailed discussion regarding the historical points, exam findings, and any diagnostic results supporting the discharge diagnosis. I personally performed the history, PE, MDM and procedures as documented above and agree with the scribe's documentation.    Portions of this note were dictated using voice recognition software. Although it was reviewed for accuracy, some inherent voice recognition errors may have occurred and may be present in this document.    Additional MDM:   Heart Score:    History:          Slightly suspicious.  ECG:             Normal  Age:               45-65 years  Risk factors: 1-2 risk factors  Troponin:       Less than or equal to normal limit  Heart Score = 2             Scribe Attestation:   Scribe #1: I performed the above scribed service and the documentation accurately describes the services I performed. I attest to the accuracy of the note.    Attending Attestation:           Physician Attestation for Scribe:  Physician Attestation Statement for Scribe #1: I, Harsha Kohli MD, reviewed documentation, as scribed by Brandon Hess in my presence, and it is both accurate and complete.             ED Course as of 10/23/23 1033   Wed Oct 11,     0330 EKG independently interpreted by me.  EKG: SB @ 58, no STEMI, QTc 420 []   0645 X-Ray Chest 1 View  Independently visualized/reviewed by me during the ED visit.  - No lobar consolidation, no PTX []      ED Course User Index  [] Harsha Kohli MD                    Clinical Impression:   Final diagnoses:  [R07.89] Atypical chest pain (Primary)  [R07.89] Chest wall pain        ED Disposition Condition    Discharge Stable          ED Prescriptions       Medication Sig Dispense Start Date End Date Auth. Provider    ibuprofen (ADVIL,MOTRIN) 600 MG tablet Take 1 tablet (600 mg total) by mouth every 6 (six) hours as needed for Pain. 20 tablet 10/11/2023 -- Harsha Kohli MD    ondansetron (ZOFRAN-ODT) 4 MG TbDL Take 1 tablet (4 mg total) by mouth every 6 (six) hours as needed (Nausea). 12 tablet 10/11/2023 -- Harsha Kohli MD    methocarbamoL (ROBAXIN) 500 MG Tab () Take 2 tablets (1,000 mg total) by mouth 3 (three) times daily. for 5 days 30 tablet 10/11/2023 10/16/2023 Harsha Kohli MD          Follow-up Information       Follow up With Specialties Details Why Contact Info    [facility] In 2 days  [address] [facility] [address]  [phone]      [facility] - Emergency Dept Emergency Medicine  If symptoms worsen [address] [address] [facility] [id number] [phone]             Harsha Kohli MD  10/23/23 5389

## 2023-10-23 ENCOUNTER — CLINICAL SUPPORT (OUTPATIENT)
Dept: INTERNAL MEDICINE | Facility: CLINIC | Age: 58
End: 2023-10-23
Attending: NURSE PRACTITIONER
Payer: MEDICARE

## 2023-10-23 ENCOUNTER — OFFICE VISIT (OUTPATIENT)
Dept: INTERNAL MEDICINE | Facility: CLINIC | Age: 58
End: 2023-10-23
Payer: MEDICARE

## 2023-10-23 VITALS
TEMPERATURE: 98 F | HEIGHT: 65 IN | RESPIRATION RATE: 18 BRPM | BODY MASS INDEX: 31.16 KG/M2 | SYSTOLIC BLOOD PRESSURE: 109 MMHG | WEIGHT: 187 LBS | HEART RATE: 61 BPM | DIASTOLIC BLOOD PRESSURE: 70 MMHG

## 2023-10-23 DIAGNOSIS — Z91.81 HISTORY OF FALL: ICD-10-CM

## 2023-10-23 DIAGNOSIS — D64.9 ANEMIA, UNSPECIFIED TYPE: Primary | ICD-10-CM

## 2023-10-23 DIAGNOSIS — Z12.5 PROSTATE CANCER SCREENING: ICD-10-CM

## 2023-10-23 DIAGNOSIS — E11.9 CONTROLLED TYPE 2 DIABETES MELLITUS WITHOUT COMPLICATION, WITHOUT LONG-TERM CURRENT USE OF INSULIN: ICD-10-CM

## 2023-10-23 DIAGNOSIS — Z98.2 VP (VENTRICULOPERITONEAL) SHUNT STATUS: ICD-10-CM

## 2023-10-23 DIAGNOSIS — G91.9 HYDROCEPHALUS, UNSPECIFIED TYPE: ICD-10-CM

## 2023-10-23 DIAGNOSIS — E11.9 CONTROLLED TYPE 2 DIABETES MELLITUS WITHOUT COMPLICATION, WITHOUT LONG-TERM CURRENT USE OF INSULIN: Primary | ICD-10-CM

## 2023-10-23 LAB
LEFT EYE DM RETINOPATHY: NEGATIVE
RIGHT EYE DM RETINOPATHY: NEGATIVE

## 2023-10-23 PROCEDURE — 99213 OFFICE O/P EST LOW 20 MIN: CPT | Mod: S$PBB,,, | Performed by: NURSE PRACTITIONER

## 2023-10-23 PROCEDURE — 92228 IMG RTA DETC/MNTR DS PHY/QHP: CPT | Mod: PBBFAC | Performed by: NURSE PRACTITIONER

## 2023-10-23 PROCEDURE — 99214 OFFICE O/P EST MOD 30 MIN: CPT | Mod: PBBFAC | Performed by: NURSE PRACTITIONER

## 2023-10-23 PROCEDURE — 99213 PR OFFICE/OUTPT VISIT, EST, LEVL III, 20-29 MIN: ICD-10-PCS | Mod: S$PBB,,, | Performed by: NURSE PRACTITIONER

## 2023-10-23 NOTE — PROGRESS NOTES
Patient ID: [id number]     Chief Complaint: LAB RESULTS        HPI:     HPI      Jude Kaur Jr. is a 57 y.o. male here today for a follow up.         Immunizations:   Immunization History   Administered Date(s) Administered    COVID-19, MRNA, LN-S, PF (Pfizer) (Purple Cap) 07/24/2021, 08/14/2021        No past medical history on file.     Past Surgical History:   Procedure Laterality Date    COLONOSCOPY  05/28/2014    Gonsalo Gonzalez MD       Review of patient's allergies indicates:  No Known Allergies    Current Outpatient Medications   Medication Instructions    ibuprofen (ADVIL,MOTRIN) 600 mg, Oral, Every 6 hours PRN    mirabegron (MYRBETRIQ) 50 mg, Oral, Daily    ondansetron (ZOFRAN-ODT) 4 mg, Oral, Every 6 hours PRN    oxybutynin (DITROPAN-XL) 10 mg, Oral, Daily    tamsulosin (FLOMAX) 0.4 mg, Oral, Daily       Social History     Socioeconomic History    Marital status:    Tobacco Use    Smoking status: Never     Passive exposure: Past    Smokeless tobacco: Never   Substance and Sexual Activity    Alcohol use: Never    Drug use: Never    Sexual activity: Yes     Birth control/protection: None     Social Determinants of Health     Financial Resource Strain: Low Risk  (4/26/2023)    Overall Financial Resource Strain (CARDIA)     Difficulty of Paying Living Expenses: Not hard at all   Food Insecurity: No Food Insecurity (4/26/2023)    Hunger Vital Sign     Worried About Running Out of Food in the Last Year: Never true     Ran Out of Food in the Last Year: Never true   Transportation Needs: No Transportation Needs (4/26/2023)    PRAPARE - Transportation     Lack of Transportation (Medical): No     Lack of Transportation (Non-Medical): No   Physical Activity: Inactive (10/23/2023)    Exercise Vital Sign     Days of Exercise per Week: 0 days     Minutes of Exercise per Session: 0 min   Stress: No Stress Concern Present (4/26/2023)    [place] [employer] - Occupational Stress  Questionnaire     Feeling of Stress : Not at all   Social Connections: Socially Isolated (4/26/2023)    Social Connection and Isolation Panel [NHANES]     Frequency of Communication with Friends and Family: Once a week     Frequency of Social Gatherings with Friends and Family: Never     Attends Adventism Services: Never     Active Member of Clubs or Organizations: No     Attends Club or Organization Meetings: Never     Marital Status:    Housing Stability: Low Risk  (10/23/2023)    Housing Stability Vital Sign     Unable to Pay for Housing in the Last Year: No     Number of Places Lived in the Last Year: 1     Unstable Housing in the Last Year: No        Family History   Problem Relation Age of Onset    Diabetes Mother     Heart disease Mother     Hypertension Mother     Diabetes Father         Patient Care Team:  Pretty Flores FNP as PCP - General (Family Medicine)     Subjective:     Review of Systems     See HPI for details    Constitutional: Denies Change in appetite. Denies Chills. Denies Fever. Denies Night sweats.  Eye: Denies Blurred vision. Denies Discharge. Denies Eye pain.  ENT: Denies Decreased hearing. Denies Sore throat. Denies Swollen glands.  Respiratory: Denies Cough. Denies Shortness of breath. Denies Shortness of breath with exertion. Denies Wheezing.  Cardiovascular: DeniesChest pain at rest. Denies Chest pain with exertion. Denies Irregular heartbeat. Denies Palpitations. Denies Edema.  Gastrointestinal: Denies Abdominal pain. DeniesDiarrhea. Denies Nausea. Denies Vomiting. Denies Hematemesis or Hematochezia.  Genitourinary: Denies Dysuria. Denies Urinary frequency. Denies Urinary urgency. Denies Blood in urine.  Endocrine: Denies Cold intolerance. Denies Excessive thirst. Denies Heat intolerance. Denies Weight loss. Denies Weight gain.  Musculoskeletal: Denies Painful joints. Denies Weakness.  Integumentary: Denies Rash. Denies Itching. Denies Dry skin.  Neurologic: Denies  "Dizziness. Denies Fainting. Denies Headache.  Psychiatric: Denies Depression. Denies Anxiety. Denies Suicidal/Homicidal ideations.    All Other ROS: Negative except as stated in HPI.       Objective:     Visit Vitals  /70 (BP Location: Right arm, Patient Position: Sitting, BP Method: Large (Automatic))   Pulse 61   Temp 97.7 °F (36.5 °C) (Oral)   Resp 18   Ht 5' 5" (1.651 m)   Wt 84.8 kg (187 lb)   BMI 31.12 kg/m²       Physical Exam    General: Alert and oriented, No acute distress.  Head: Normocephalic, Atraumatic.  Eye: Pupils are equal, round and reactive to light, Extraocular movements are intact, Sclera non-icteric.  Ears/Nose/Throat: Normal, Mucosa moist,Clear.  Neck/Thyroid: Supple, Non-tender, No carotid bruit, No lymphadenopathy, No JVD, Full range of motion.  Respiratory: Clear to auscultation bilaterally; No wheezes, rales or rhonchi,Non-labored respirations, Symmetrical chest wall expansion.  Cardiovascular: Regular rate and rhythm, S1/S2 normal, No murmurs, rubs or gallops.  Gastrointestinal: Soft, Non-tender, Non-distended, Normal bowel sounds, No palpable organomegaly.  Musculoskeletal: Normal range of motion.  Integumentary: Warm, Dry, Intact, No suspicious lesions or rashes.  Extremities: No clubbing, cyanosis or edema  Neurologic: No focal deficits, Cranial Nerves II-XII are grossly intact, Motor strength normal upper and lower extremities, Sensory exam intact.  Psychiatric: Normal interaction, Coherent speech, Euthymic mood, Appropriate affect       Labs Reviewed:     Chemistry:  Lab Results   Component Value Date     10/11/2023    K 3.5 10/11/2023    CHLORIDE 113 (H) 10/11/2023    BUN 16.7 10/11/2023    CREATININE 0.96 10/11/2023    EGFRNORACEVR >60 10/11/2023    GLUCOSE 154 (H) 10/11/2023    CALCIUM 8.8 10/11/2023    ALKPHOS 81 10/11/2023    LABPROT 6.8 10/11/2023    ALBUMIN 3.7 10/11/2023    BILIDIR 0.2 03/31/2022    IBILI 0.20 03/31/2022    AST 27 10/11/2023    ALT 43 10/11/2023 "    MG 1.90 10/11/2023        Lab Results   Component Value Date    HGBA1C 5.7 10/23/2023        Hematology:  Lab Results   Component Value Date    WBC 5.16 10/23/2023    HGB 11.6 (L) 10/23/2023    HCT 37.5 (L) 10/23/2023     10/23/2023       Lipid Panel:  Lab Results   Component Value Date    CHOL 144 03/24/2023    HDL 32 (L) 03/24/2023    LDL 92.00 03/24/2023    TRIG 99 03/24/2023    TOTALCHOLEST 5 03/24/2023        Urine:  Lab Results   Component Value Date    COLORUA Colorless (A) 01/17/2023    APPEARANCEUA Clear 01/17/2023    SGUA 1.012 01/17/2023    PHUA 5.5 01/17/2023    PROTEINUA Negative 01/17/2023    GLUCOSEUA Normal 01/17/2023    KETONESUA Negative 01/17/2023    BLOODUA Trace (A) 01/17/2023    NITRITESUA Negative 01/17/2023    LEUKOCYTESUR Negative 01/17/2023    RBCUA 0-5 01/17/2023    WBCUA 0-5 01/17/2023    BACTERIA None Seen 01/17/2023    SQEPUA Trace (A) 01/17/2023    HYALINECASTS None Seen 01/17/2023    CREATRANDUR 203.3 (H) 10/26/2022        Assessment:       ICD-10-CM ICD-9-CM   1. Anemia, unspecified type  D64.9 285.9   2. Controlled type 2 diabetes mellitus without complication, without long-term current use of insulin  E11.9 250.00   3. History of fall  Z91.81 V15.88   4.  (ventriculoperitoneal) shunt status  Z98.2 V45.2   5. Hydrocephalus, unspecified type  G91.9 331.4   6. Prostate cancer screening  Z12.5 V76.44        Plan:     1. Anemia, unspecified type  CBC in 3 months.    2. Controlled type 2 diabetes mellitus without complication, without long-term current use of insulin  A1c 5.7. ADA diet and exercise. Controlled with diet and exercise. Urine microalbumin 10-26-22. DM foot done today. DM eye done today. Pt refused statin therapy.     3. History of fall  Encouraged fall precautions. Education provided.     4.  (ventriculoperitoneal) shunt status   Pt had CT head done 1-30-23 showing  CT Head Without Contrast  Order: [id number]  Status: Final result     Visible to patient: Yes  (not seen)     Next appt: 03/01/2023 at 11:30 AM in Urology (Andreea Villatoro, )     Dx: Dizziness; Hydrocephalus, unspecified...     0 Result Notes  Details     Reading Physician Reading Date Result Priority   Luis Blanco MD  [phone] 1/30/2023 Routine      Narrative & Impression  EXAMINATION:  CT HEAD WITHOUT CONTRAST     CLINICAL HISTORY:  57-year-old man for follow-up of hydrocephalus.     TECHNIQUE:  Thin slice noncontrast CT imaging was performed from below the skull base through the skull vertex per routine protocol and with coronal and sagittal reformatted images generated from the axial data set.  Automatic dose modulation and/or weight based mA/kv utilized to achieve as low as reasonable radiation dose.     COMPARISON:  Head CT without contrast 08/05/2020.     FINDINGS:  There are bilateral parietal approach ventriculoperitoneal shunt catheters remaining unchanged in position.  The ventricles are similar in morphology to the prior CT exam with diffusely dilated lateral ventricles and normal size 3rd and 4th ventricles.  No acute parenchymal abnormality is identified.  There is no intracranial hemorrhage or abnormal fluid collection.  No posterior fossa pathology is identified.  There is no significant extracranial soft tissue abnormality.  There is no acute osseous abnormality or osseous lesion.  Chronic thick periosteal reaction along the inner table of the left calvarium is unchanged in the interval.  The paranasal sinuses are well developed with mild, thin mucosal thickening at the bilateral ethmoid sinuses and several mucous retention cysts in the bilateral maxillary sinuses.  No paranasal sinus fluid.  The well pneumatized bilateral mastoids and middle ears are clear.     Impression:     Chronic dilation of the lateral ventricles which is unchanged since 08/05/2020 with bilateral ventriculoperitoneal shunts remaining in place and with no acute pathology at the head or significant change since  08/05/2020.     Mild chronic sinusitis at the ethmoids and maxillary sinuses.        Electronically signed by: Luis Blanco  Date:                                            01/30/2023  Time:                                           10:34             Exam Ended: 01/30/23 10:24 Last Resulted: 01/30/23 10:34                5. Hydrocephalus, unspecified type   Pt had CT head done 1-30-23 showing  CT Head Without Contrast  Order: [id number]  Status: Final result     Visible to patient: Yes (not seen)     Next appt: 03/01/2023 at 11:30 AM in Urology (Andreea Villatoro, )     Dx: Dizziness; Hydrocephalus, unspecified...     0 Result Notes  Details     Reading Physician Reading Date Result Priority   Luis Blanco MD  [phone] 1/30/2023 Routine      Narrative & Impression  EXAMINATION:  CT HEAD WITHOUT CONTRAST     CLINICAL HISTORY:  57-year-old man for follow-up of hydrocephalus.     TECHNIQUE:  Thin slice noncontrast CT imaging was performed from below the skull base through the skull vertex per routine protocol and with coronal and sagittal reformatted images generated from the axial data set.  Automatic dose modulation and/or weight based mA/kv utilized to achieve as low as reasonable radiation dose.     COMPARISON:  Head CT without contrast 08/05/2020.     FINDINGS:  There are bilateral parietal approach ventriculoperitoneal shunt catheters remaining unchanged in position.  The ventricles are similar in morphology to the prior CT exam with diffusely dilated lateral ventricles and normal size 3rd and 4th ventricles.  No acute parenchymal abnormality is identified.  There is no intracranial hemorrhage or abnormal fluid collection.  No posterior fossa pathology is identified.  There is no significant extracranial soft tissue abnormality.  There is no acute osseous abnormality or osseous lesion.  Chronic thick periosteal reaction along the inner table of the left calvarium is unchanged in the interval.  The paranasal  sinuses are well developed with mild, thin mucosal thickening at the bilateral ethmoid sinuses and several mucous retention cysts in the bilateral maxillary sinuses.  No paranasal sinus fluid.  The well pneumatized bilateral mastoids and middle ears are clear.     Impression:     Chronic dilation of the lateral ventricles which is unchanged since 08/05/2020 with bilateral ventriculoperitoneal shunts remaining in place and with no acute pathology at the head or significant change since 08/05/2020.     Mild chronic sinusitis at the ethmoids and maxillary sinuses.        Electronically signed by: Luis Blanco  Date:                                            01/30/2023  Time:                                           10:34             Exam Ended: 01/30/23 10:24 Last Resulted: 01/30/23 10:34                6. Prostate cancer screening  PSA in 3 months.          Follow up in about 3 months (around 1/23/2024) for with labs 1 week prior to appt. . In addition to their scheduled follow up, the patient has also been instructed to follow up on as needed basis.     Future Appointments   Date Time Provider Department Center   11/2/2023  2:45 PM Andreea Flores DO [facility] GULSHAN Silva

## 2023-10-24 NOTE — PROGRESS NOTES
Jude Kaur JrMya is a 57 y.o. male here for a diabetic eye screening with non-dilated fundus photos per GULSHAN Sutherland.    Patient cooperative?: Yes  Small pupils?: No  Last eye exam: unknown    For exam results, see Encounter Report.

## 2023-11-02 ENCOUNTER — OFFICE VISIT (OUTPATIENT)
Dept: UROLOGY | Facility: CLINIC | Age: 58
End: 2023-11-02
Payer: MEDICARE

## 2023-11-02 ENCOUNTER — LAB VISIT (OUTPATIENT)
Dept: LAB | Facility: HOSPITAL | Age: 58
End: 2023-11-02
Attending: UROLOGY
Payer: MEDICARE

## 2023-11-02 VITALS
BODY MASS INDEX: 31.22 KG/M2 | SYSTOLIC BLOOD PRESSURE: 105 MMHG | WEIGHT: 187.38 LBS | OXYGEN SATURATION: 98 % | HEART RATE: 63 BPM | DIASTOLIC BLOOD PRESSURE: 69 MMHG | HEIGHT: 65 IN | TEMPERATURE: 98 F | RESPIRATION RATE: 20 BRPM

## 2023-11-02 DIAGNOSIS — N13.8 BPH WITH OBSTRUCTION/LOWER URINARY TRACT SYMPTOMS: Primary | ICD-10-CM

## 2023-11-02 DIAGNOSIS — R35.0 URINARY FREQUENCY: ICD-10-CM

## 2023-11-02 DIAGNOSIS — N13.8 BPH WITH OBSTRUCTION/LOWER URINARY TRACT SYMPTOMS: ICD-10-CM

## 2023-11-02 DIAGNOSIS — N40.1 BPH WITH OBSTRUCTION/LOWER URINARY TRACT SYMPTOMS: ICD-10-CM

## 2023-11-02 DIAGNOSIS — N40.1 BPH WITH OBSTRUCTION/LOWER URINARY TRACT SYMPTOMS: Primary | ICD-10-CM

## 2023-11-02 LAB — PSA SERPL-MCNC: 3.16 NG/ML

## 2023-11-02 PROCEDURE — 99214 PR OFFICE/OUTPT VISIT, EST, LEVL IV, 30-39 MIN: ICD-10-PCS | Mod: S$PBB,,, | Performed by: UROLOGY

## 2023-11-02 PROCEDURE — 84153 ASSAY OF PSA TOTAL: CPT

## 2023-11-02 PROCEDURE — 99214 OFFICE O/P EST MOD 30 MIN: CPT | Mod: PBBFAC | Performed by: UROLOGY

## 2023-11-02 PROCEDURE — 99214 OFFICE O/P EST MOD 30 MIN: CPT | Mod: S$PBB,,, | Performed by: UROLOGY

## 2023-11-02 PROCEDURE — 36415 COLL VENOUS BLD VENIPUNCTURE: CPT

## 2023-11-02 NOTE — PROGRESS NOTES
CC:  Follow-up    HPI:  Jude Kaur Jr. is a 57 y.o. male seen for follow-up.  He was having urinary frequency and nocturia up to five times.  I placed him on oxybutynin in since then he has had decreased daytime urinary frequency and nocturia has been one to two times usually and occasionally three times.  Recently the nocturia has increased up to five times on occasion but he states that he does drink a lot of water and drinks right up to bedtime.  He is on tamsulosin for BPH.    Bladder scan residual:  116 ml    Urinalysis:  Unable to give a sample for urinalysis today.    Lab Results:  PSA History:     Latest Reference Range & Units 05/18/17 10:50 06/18/18 09:25 07/17/19 10:05 07/15/20 09:39 04/19/21 08:28 10/17/22 09:36   Prostate Specific Antigen <=4.00 ng/mL 1.5 1.8 2.3 2.9 2.54 3.03     Recent Labs     10/11/23  0319   CREATININE 0.96      ROS:  All systems reviewed and are negative except as documented in HPI and/or Assessment and Plan.     Patient Active Problem List:     Patient Active Problem List   Diagnosis    Anemia    Controlled type 2 diabetes mellitus without complication, without long-term current use of insulin    Hematuria    History of fall    Hydrocephalus    Loss of balance    Obesity    Pain in head     (ventriculoperitoneal) shunt status        Past Medical History:  History reviewed. No pertinent past medical history.     Past Surgical History:  Past Surgical History:   Procedure Laterality Date    COLONOSCOPY  05/28/2014    Gonsalo Gonzalez MD        Family History:  Family History   Problem Relation Age of Onset    Diabetes Mother     Heart disease Mother     Hypertension Mother     Diabetes Father         Social History:  Social History     Socioeconomic History    Marital status:    Tobacco Use    Smoking status: Never     Passive exposure: Past    Smokeless tobacco: Never   Substance and Sexual Activity    Alcohol use: Never    Drug use: Never    Sexual activity: Yes      Partners: Female     Birth control/protection: None     Social Determinants of Health     Financial Resource Strain: Low Risk  (4/26/2023)    Overall Financial Resource Strain (CARDIA)     Difficulty of Paying Living Expenses: Not hard at all   Food Insecurity: No Food Insecurity (4/26/2023)    Hunger Vital Sign     Worried About Running Out of Food in the Last Year: Never true     Ran Out of Food in the Last Year: Never true   Transportation Needs: No Transportation Needs (4/26/2023)    PRAPARE - Transportation     Lack of Transportation (Medical): No     Lack of Transportation (Non-Medical): No   Physical Activity: Inactive (10/23/2023)    Exercise Vital Sign     Days of Exercise per Week: 0 days     Minutes of Exercise per Session: 0 min   Stress: No Stress Concern Present (4/26/2023)    [employer] [place] [employer] - Occupational Stress Questionnaire     Feeling of Stress : Not at all   Social Connections: Socially Isolated (4/26/2023)    Social Connection and Isolation Panel [NHANES]     Frequency of Communication with Friends and Family: Once a week     Frequency of Social Gatherings with Friends and Family: Never     Attends Tenriism Services: Never     Active Member of Clubs or Organizations: No     Attends Club or Organization Meetings: Never     Marital Status:    Housing Stability: Low Risk  (10/23/2023)    Housing Stability Vital Sign     Unable to Pay for Housing in the Last Year: No     Number of Places Lived in the Last Year: 1     Unstable Housing in the Last Year: No        Allergies:  Review of patient's allergies indicates:  No Known Allergies     Objective:  Vitals:    11/02/23 1454   BP: 105/69   Pulse: 63   Resp: 20   Temp: 97.6 °F (36.4 °C)     General:  Well developed, well nourished adult male in no acute distress  Abdomen: Soft, nontender, no masses  Extremities:  No clubbing, cyanosis, or edema  Neurologic:  Grossly intact  Musculoskeletal:  Normal tone  Penis:   Circumcised, no lesions  Scrotum:  No hydrocele  Testicles:  Nontender, no masses  Epididymis:  Normal without masses  Prostate exam:  Nontender, symmetrical, no nodules  Rectal:  Normal rectal tone      Assessment:  1. BPH with obstruction/lower urinary tract symptoms  - POCT Bladder Scan  - PSA, Total (Diagnostic); Future    2. Urinary frequency  - POCT Bladder Scan     Plan:  Continue tamsulosin.  PSA today.  Call with those results.  Continue oxybutynin.  Try to reduce fluid intake one to two hours prior to bedtime.    Follow-up:  Six months for bladder scan.

## 2023-11-03 ENCOUNTER — TELEPHONE (OUTPATIENT)
Dept: UROLOGY | Facility: CLINIC | Age: 58
End: 2023-11-03
Payer: MEDICARE

## 2024-01-19 ENCOUNTER — TELEPHONE (OUTPATIENT)
Dept: UROLOGY | Facility: CLINIC | Age: 59
End: 2024-01-19
Payer: MEDICARE

## 2024-01-19 NOTE — TELEPHONE ENCOUNTER
Called pt who stated he has been having some increased frequency since Tuesday & last night he starting having burning w/urination. Asked pt to walk in to clinic to submit a urine sample for testing & pt stated he will come in Monday morning.    ----- Message from [employer] sent at 1/19/2024  9:43 AM CST -----  Regarding: urinary frequency  Pt called and complained about urinary frequency . Please contact pt soon [phone]

## 2024-01-22 ENCOUNTER — CLINICAL SUPPORT (OUTPATIENT)
Dept: UROLOGY | Facility: CLINIC | Age: 59
End: 2024-01-22
Payer: MEDICARE

## 2024-01-22 DIAGNOSIS — N40.1 BPH WITH OBSTRUCTION/LOWER URINARY TRACT SYMPTOMS: Primary | ICD-10-CM

## 2024-01-22 DIAGNOSIS — N13.8 BPH WITH OBSTRUCTION/LOWER URINARY TRACT SYMPTOMS: Primary | ICD-10-CM

## 2024-01-22 LAB
BILIRUB SERPL-MCNC: NORMAL MG/DL
BLOOD URINE, POC: NORMAL
COLOR, POC UA: YELLOW
GLUCOSE UR QL STRIP: NORMAL
KETONES UR QL STRIP: NORMAL
LEUKOCYTE ESTERASE URINE, POC: NORMAL
NITRITE, POC UA: NORMAL
PH, POC UA: 6
PROTEIN, POC: NORMAL
SPECIFIC GRAVITY, POC UA: 1.02
UROBILINOGEN, POC UA: 0.2

## 2024-01-22 PROCEDURE — 81001 URINALYSIS AUTO W/SCOPE: CPT | Mod: PBBFAC

## 2024-01-22 NOTE — PROGRESS NOTES
Presented to [facility] - Urine collection for follow up UA  As per Andreea Flores MD  Urine collected for UA  Pt notified  he will receive a phone call w/ results.  Pt voiced understanding.

## 2024-01-29 ENCOUNTER — HOSPITAL ENCOUNTER (EMERGENCY)
Facility: HOSPITAL | Age: 59
Discharge: HOME | End: 2024-01-29
Attending: STUDENT IN AN ORGANIZED HEALTH CARE EDUCATION/TRAINING PROGRAM
Payer: MEDICARE

## 2024-01-29 VITALS
HEART RATE: 74 BPM | SYSTOLIC BLOOD PRESSURE: 118 MMHG | TEMPERATURE: 99 F | BODY MASS INDEX: 29.99 KG/M2 | HEIGHT: 65 IN | DIASTOLIC BLOOD PRESSURE: 79 MMHG | WEIGHT: 180 LBS | RESPIRATION RATE: 18 BRPM | OXYGEN SATURATION: 97 %

## 2024-01-29 DIAGNOSIS — H69.91 DYSFUNCTION OF RIGHT EUSTACHIAN TUBE: ICD-10-CM

## 2024-01-29 DIAGNOSIS — U07.1 COVID-19: Primary | ICD-10-CM

## 2024-01-29 LAB
ALBUMIN SERPL-MCNC: 3.8 G/DL (ref 3.4–4.8)
ALBUMIN/GLOB SERPL: 1.1 RATIO (ref 1.1–2)
ALP SERPL-CCNC: 93 UNIT/L (ref 40–150)
ALT SERPL-CCNC: 35 UNIT/L (ref 0–55)
APPEARANCE UR: CLEAR
AST SERPL-CCNC: 20 UNIT/L (ref 5–34)
BACTERIA #/AREA URNS AUTO: NORMAL /HPF
BASOPHILS # BLD AUTO: 0.01 X10(3)/MCL
BASOPHILS NFR BLD AUTO: 0.1 %
BILIRUB SERPL-MCNC: 0.3 MG/DL
BILIRUB UR QL STRIP.AUTO: NEGATIVE
BUN SERPL-MCNC: 15.5 MG/DL (ref 8.4–25.7)
CALCIUM SERPL-MCNC: 8.9 MG/DL (ref 8.8–10)
CHLORIDE SERPL-SCNC: 113 MMOL/L (ref 98–107)
CO2 SERPL-SCNC: 23 MMOL/L (ref 23–31)
COLOR UR AUTO: ABNORMAL
CREAT SERPL-MCNC: 0.82 MG/DL (ref 0.73–1.18)
EOSINOPHIL # BLD AUTO: 0.03 X10(3)/MCL (ref 0–0.9)
EOSINOPHIL NFR BLD AUTO: 0.4 %
ERYTHROCYTE [DISTWIDTH] IN BLOOD BY AUTOMATED COUNT: 15.1 % (ref 11.5–17)
FLUAV AG UPPER RESP QL IA.RAPID: NOT DETECTED
FLUBV AG UPPER RESP QL IA.RAPID: NOT DETECTED
GFR SERPLBLD CREATININE-BSD FMLA CKD-EPI: >60 MLS/MIN/1.73/M2
GLOBULIN SER-MCNC: 3.4 GM/DL (ref 2.4–3.5)
GLUCOSE SERPL-MCNC: 118 MG/DL (ref 82–115)
GLUCOSE UR QL STRIP.AUTO: NEGATIVE
HCT VFR BLD AUTO: 39.4 % (ref 42–52)
HGB BLD-MCNC: 12.2 G/DL (ref 14–18)
IMM GRANULOCYTES # BLD AUTO: 0.02 X10(3)/MCL (ref 0–0.04)
IMM GRANULOCYTES NFR BLD AUTO: 0.3 %
KETONES UR QL STRIP.AUTO: NEGATIVE
LEUKOCYTE ESTERASE UR QL STRIP.AUTO: NEGATIVE
LYMPHOCYTES # BLD AUTO: 0.85 X10(3)/MCL (ref 0.6–4.6)
LYMPHOCYTES NFR BLD AUTO: 11.5 %
MCH RBC QN AUTO: 23.8 PG (ref 27–31)
MCHC RBC AUTO-ENTMCNC: 31 G/DL (ref 33–36)
MCV RBC AUTO: 77 FL (ref 80–94)
MONOCYTES # BLD AUTO: 0.63 X10(3)/MCL (ref 0.1–1.3)
MONOCYTES NFR BLD AUTO: 8.5 %
NEUTROPHILS # BLD AUTO: 5.88 X10(3)/MCL (ref 2.1–9.2)
NEUTROPHILS NFR BLD AUTO: 79.2 %
NITRITE UR QL STRIP.AUTO: NEGATIVE
NRBC BLD AUTO-RTO: 0 %
PH UR STRIP.AUTO: 6 [PH]
PLATELET # BLD AUTO: 218 X10(3)/MCL (ref 130–400)
PMV BLD AUTO: 10.7 FL (ref 7.4–10.4)
POTASSIUM SERPL-SCNC: 3.8 MMOL/L (ref 3.5–5.1)
PROT SERPL-MCNC: 7.2 GM/DL (ref 5.8–7.6)
PROT UR QL STRIP.AUTO: NEGATIVE
RBC # BLD AUTO: 5.12 X10(6)/MCL (ref 4.7–6.1)
RBC #/AREA URNS AUTO: NORMAL /HPF
RBC UR QL AUTO: ABNORMAL
SARS-COV-2 RNA RESP QL NAA+PROBE: DETECTED
SODIUM SERPL-SCNC: 142 MMOL/L (ref 136–145)
SP GR UR STRIP.AUTO: 1.02 (ref 1–1.03)
SQUAMOUS #/AREA URNS AUTO: NORMAL /HPF
STREP A PCR (OHS): NOT DETECTED
UROBILINOGEN UR STRIP-ACNC: 0.2
WBC # SPEC AUTO: 7.42 X10(3)/MCL (ref 4.5–11.5)
WBC #/AREA URNS AUTO: NORMAL /HPF

## 2024-01-29 PROCEDURE — 0240U COVID/FLU A&B PCR: CPT | Performed by: STUDENT IN AN ORGANIZED HEALTH CARE EDUCATION/TRAINING PROGRAM

## 2024-01-29 PROCEDURE — 99284 EMERGENCY DEPT VISIT MOD MDM: CPT | Mod: 25

## 2024-01-29 PROCEDURE — 81001 URINALYSIS AUTO W/SCOPE: CPT | Performed by: STUDENT IN AN ORGANIZED HEALTH CARE EDUCATION/TRAINING PROGRAM

## 2024-01-29 PROCEDURE — 85025 COMPLETE CBC W/AUTO DIFF WBC: CPT | Performed by: STUDENT IN AN ORGANIZED HEALTH CARE EDUCATION/TRAINING PROGRAM

## 2024-01-29 PROCEDURE — 87651 STREP A DNA AMP PROBE: CPT | Performed by: STUDENT IN AN ORGANIZED HEALTH CARE EDUCATION/TRAINING PROGRAM

## 2024-01-29 PROCEDURE — 80053 COMPREHEN METABOLIC PANEL: CPT | Performed by: STUDENT IN AN ORGANIZED HEALTH CARE EDUCATION/TRAINING PROGRAM

## 2024-01-29 RX ORDER — FLUTICASONE PROPIONATE 50 MCG
1 SPRAY, SUSPENSION (ML) NASAL DAILY
Qty: 15 G | Refills: 0 | Status: SHIPPED | OUTPATIENT
Start: 2024-01-29

## 2024-01-29 NOTE — ED PROVIDER NOTES
"Encounter Date: 1/29/2024       History     Chief Complaint   Patient presents with    Otalgia     Pt complaint of "ringing" to ear x 6 days (2) sore throat (3) frequency of urination relating hx of prostate problems treated with Rx Tamsulin and oxybutynin with family relating that he has a " shunt"     HPI    58-year-old male with a past medical history of  shunt that he has had since he was a small child as well as BPH presents emergency department for multiple complaints today.    1. Ringing of his ear.  Patient states he has been having ringing in his ears for last 6 days.  States that he is also having some decreased hearing.  States that his ear feels full.  Denies any dizziness.  States that he is also having some nasal congestion.  No headache.  No changes in vision.    2. Sore throat.  States he has been having a sore throat for last 2-3 days.  Has not take any medication.  No fevers.  States he does have a dry cough.      3. Urinary frequency.  Patient states he has been having urinary frequency for a while now.  States that he sees a urologist in his on oxybutynin and Flomax.  States that he is persistent.  He wants to make sure he does not have urinary tract infection.      Review of patient's allergies indicates:  No Known Allergies  No past medical history on file.  Past Surgical History:   Procedure Laterality Date    COLONOSCOPY  05/28/2014    Gonsalo Gonzalez MD     Family History   Problem Relation Age of Onset    Diabetes Mother     Heart disease Mother     Hypertension Mother     Diabetes Father      Social History     Tobacco Use    Smoking status: Never     Passive exposure: Past    Smokeless tobacco: Never   Substance Use Topics    Alcohol use: Never    Drug use: Never     Review of Systems   Constitutional:  Negative for fever.   HENT:  Positive for congestion, ear pain, rhinorrhea, sinus pressure and sore throat.    Eyes:  Negative for photophobia and visual disturbance.   Respiratory:  " Positive for cough.    Cardiovascular:  Negative for chest pain.   Gastrointestinal:  Negative for abdominal pain, constipation, diarrhea, nausea and vomiting.   Genitourinary:  Positive for frequency.   Neurological:  Negative for dizziness and headaches.   All other systems reviewed and are negative.      Physical Exam     Initial Vitals [01/29/24 0844]   BP Pulse Resp Temp SpO2   112/80 72 18 99 °F (37.2 °C) 97 %      MAP       --         Physical Exam    Nursing note and vitals reviewed.  Constitutional: He appears well-developed and well-nourished. No distress.   HENT:   Mouth/Throat: No oropharyngeal exudate.   Right TM with bulging with no erythema   Cardiovascular:  Normal rate and regular rhythm.           Pulmonary/Chest: Breath sounds normal. No respiratory distress.   Abdominal: Abdomen is soft. There is no abdominal tenderness.   Musculoskeletal:         General: No tenderness. Normal range of motion.     Neurological: He is alert and oriented to person, place, and time.   Skin: Skin is warm. Capillary refill takes less than 2 seconds.         ED Course   Procedures  Labs Reviewed   COVID/FLU A&B PCR - Abnormal; Notable for the following components:       Result Value    SARS-CoV-2 PCR Detected (*)     All other components within normal limits    Narrative:     The Xpert Xpress SARS-CoV-2/FLU/RSV plus is a rapid, multiplexed real-time PCR test intended for the simultaneous qualitative detection and differentiation of SARS-CoV-2, Influenza A, Influenza B, and respiratory syncytial virus (RSV) viral RNA in either nasopharyngeal swab or nasal swab specimens.         COMPREHENSIVE METABOLIC PANEL - Abnormal; Notable for the following components:    Chloride 113 (*)     Glucose Level 118 (*)     All other components within normal limits   URINALYSIS, REFLEX TO URINE CULTURE - Abnormal; Notable for the following components:    Blood, UA Trace (*)     All other components within normal limits   CBC WITH  DIFFERENTIAL - Abnormal; Notable for the following components:    Hgb 12.2 (*)     Hct 39.4 (*)     MCV 77.0 (*)     MCH 23.8 (*)     MCHC 31.0 (*)     MPV 10.7 (*)     All other components within normal limits   URINALYSIS, MICROSCOPIC - Normal   CBC W/ AUTO DIFFERENTIAL    Narrative:     The following orders were created for panel order CBC auto differential.  Procedure                               Abnormality         Status                     ---------                               -----------         ------                     CBC with Differential[[id number]]       Abnormal            Final result                 Please view results for these tests on the individual orders.   STREP GROUP A BY PCR          Imaging Results              CT Head Without Contrast (Final result)  Result time 01/29/24 09:50:09      Final result by Jorje Rodriguez MD (01/29/24 09:50:09)                   Narrative:    EXAMINATION  CT HEAD WITHOUT CONTRAST    CLINICAL HISTORY  Dizziness, persistent/recurrent, cardiac or vascular cause suspected;History of  shunt;    TECHNIQUE  Axial non-contrast CT images of the head were acquired and multiplanar reconstructions accomplished by a CT technologist at a separate workstation, pushed to PACS for physician review.    COMPARISON  30 January 2023    FINDINGS  Images were reviewed in subdural, brain, soft tissue, and bone windows.    Exam quality: Motion/streak artifact limits assessment of the posterior fossa.    Hemorrhage: No evidence of acute hyperattenuating blood products.    Parenchyma: No discrete mass, localized mass-effect, or CT evidence of an acute territorial cortical-based ischemic insult. Gray-white differentiation is preserved.    Midline shift: None.    CSF spaces: Bilateral parietal approach  shunt tubing again appreciated, unremarkable positioning.  There is grossly similar appearance of diffuse ventricular system dilatation.  No extra-axial masses or expansile fluid  collections.    Vasculature: No hyperdense artery identified. No abnormal densities within the dural sinuses.    Other findings: No abnormalities of the scalp or subjacent osseous structures. Mastoids are well aerated. No focal abnormality of the sella. The included facial structures are unremarkable.    IMPRESSION  1. No convincing acute intracranial abnormality.  2. Additional secondary details discussed above, relatively unchanged from the 30 January 2023 appearance.    RADIATION DOSE  Automated tube current modulation, weight-based exposure dosing, and/or iterative reconstruction technique utilized to reach lowest reasonably achievable exposure rate.    DLP: 1191 mGy*cm      Electronically signed by: Jorje Rodriguez  Date:    01/29/2024  Time:    09:50                                     Medications - No data to display  Medical Decision Making  differential diagnosis  Viral syndrome, Eustachian tube dysfunction, strep,  shunt abnormality, electrolyte abnormality,  as well as multiple other possible etiologies      Secondary to  shunt with no follow-up we will obtain a CT to make sure there is no secondary causes although highly likely of viral syndrome versus eustachian tube dysfunction    Problems Addressed:  COVID-19: acute illness or injury  Dysfunction of right eustachian tube: self-limited or minor problem    Amount and/or Complexity of Data Reviewed  Labs: ordered. Decision-making details documented in ED Course.  Radiology: ordered.               ED Course as of 01/29/24 0956   Mon Jan 29, 2024   0920 Leukocytes, UA: Negative [BS]   0920 NITRITE UA: Negative [BS]   0920 WBC: 7.42 [BS]   0920 Hemoglobin(!): 12.2 [BS]   0920 Hematocrit(!): 39.4 [BS]   0920 Platelet Count: 218 [BS]   0941 Sodium: 142 [BS]   0941 Potassium: 3.8 [BS]   0941 Chloride(!): 113 [BS]   0941 CO2: 23 [BS]   0941 Glucose(!): 118 [BS]   0941 BUN: 15.5 [BS]   0941 Creatinine: 0.82 [BS]   0955 SARS-CoV2 (COVID-19) Qualitative PCR(!):  Detected [BS]   0955 Influenza B, Molecular: Not Detected [BS]   0955 Influenza A, Molecular: Not Detected [BS]      ED Course User Index  [BS] Ranjeet Thomas MD                           Clinical Impression:  Final diagnoses:  [U07.1] COVID-19 (Primary)  [H69.91] Dysfunction of right eustachian tube          ED Disposition Condition    Discharge Stable          ED Prescriptions       Medication Sig Dispense Start Date End Date Auth. Provider    fluticasone propionate (FLONASE) 50 mcg/actuation nasal spray 1 spray (50 mcg total) by Each Nostril route once daily. 15 g 1/29/2024 -- Ranjeet Thomas MD          Follow-up Information       Follow up With Specialties Details Why Contact Info    Pretty Flores, P Family Medicine Schedule an appointment as soon as possible for a visit   [address] [facility] [address]  [phone]      [facility] - Emergency Dept Emergency Medicine Go to  If symptoms worsen [id number] [facility] [id number] [phone]             Ranjeet Thomas MD  01/29/24 0964

## 2024-01-29 NOTE — ED TRIAGE NOTES
"  Pt complaint of "ringing" to ear x 6 days (2) sore throat (3) frequency of urination relating hx of prostate problems treated with Rx Tamsulin and oxybutynin with family relating that he has a " shunt"  "

## 2024-02-07 ENCOUNTER — OFFICE VISIT (OUTPATIENT)
Dept: INTERNAL MEDICINE | Facility: CLINIC | Age: 59
End: 2024-02-07
Payer: MEDICARE

## 2024-02-07 VITALS
WEIGHT: 190 LBS | BODY MASS INDEX: 31.65 KG/M2 | HEIGHT: 65 IN | TEMPERATURE: 98 F | RESPIRATION RATE: 18 BRPM | DIASTOLIC BLOOD PRESSURE: 72 MMHG | HEART RATE: 60 BPM | SYSTOLIC BLOOD PRESSURE: 121 MMHG

## 2024-02-07 DIAGNOSIS — D64.9 ANEMIA, UNSPECIFIED TYPE: Primary | ICD-10-CM

## 2024-02-07 DIAGNOSIS — G91.9 HYDROCEPHALUS, UNSPECIFIED TYPE: ICD-10-CM

## 2024-02-07 DIAGNOSIS — Z91.81 HISTORY OF FALL: ICD-10-CM

## 2024-02-07 DIAGNOSIS — Z00.00 WELL ADULT EXAM: ICD-10-CM

## 2024-02-07 DIAGNOSIS — Z12.5 PROSTATE CANCER SCREENING: ICD-10-CM

## 2024-02-07 DIAGNOSIS — U07.1 COVID: ICD-10-CM

## 2024-02-07 DIAGNOSIS — Z98.2 VP (VENTRICULOPERITONEAL) SHUNT STATUS: ICD-10-CM

## 2024-02-07 DIAGNOSIS — E11.9 CONTROLLED TYPE 2 DIABETES MELLITUS WITHOUT COMPLICATION, WITHOUT LONG-TERM CURRENT USE OF INSULIN: ICD-10-CM

## 2024-02-07 PROCEDURE — 99214 OFFICE O/P EST MOD 30 MIN: CPT | Mod: PBBFAC | Performed by: NURSE PRACTITIONER

## 2024-02-07 PROCEDURE — 99214 OFFICE O/P EST MOD 30 MIN: CPT | Mod: S$PBB,,, | Performed by: NURSE PRACTITIONER

## 2024-02-07 NOTE — PROGRESS NOTES
Patient ID: [id number]     Chief Complaint: ER - FOLLOW UP / COVID        HPI:     HPI      Jude Kaur Jr. is a 58 y.o. male here today for a follow up. Pt states was diagnosed with COVID 1-29-24 but states is feeling much better.         Immunizations:   Immunization History   Administered Date(s) Administered    COVID-19, MRNA, LN-S, PF (Pfizer) (Purple Cap) 07/24/2021, 08/14/2021        No past medical history on file.     Past Surgical History:   Procedure Laterality Date    COLONOSCOPY  05/28/2014    Gonsalo Gonzalez MD       Review of patient's allergies indicates:  No Known Allergies    Current Outpatient Medications   Medication Instructions    fluticasone propionate (FLONASE) 50 mcg, Each Nostril, Daily    ibuprofen (ADVIL,MOTRIN) 600 mg, Oral, Every 6 hours PRN    mirabegron (MYRBETRIQ) 50 mg, Oral, Daily    ondansetron (ZOFRAN-ODT) 4 mg, Oral, Every 6 hours PRN    oxybutynin (DITROPAN-XL) 10 mg, Oral, Daily    tamsulosin (FLOMAX) 0.4 mg, Oral, Daily       Social History     Socioeconomic History    Marital status:    Tobacco Use    Smoking status: Never     Passive exposure: Past    Smokeless tobacco: Never   Substance and Sexual Activity    Alcohol use: Never    Drug use: Never    Sexual activity: Yes     Partners: Female     Birth control/protection: None     Social Determinants of Health     Financial Resource Strain: Low Risk  (1/31/2024)    Overall Financial Resource Strain (CARDIA)     Difficulty of Paying Living Expenses: Not very hard   Food Insecurity: No Food Insecurity (1/31/2024)    Hunger Vital Sign     Worried About Running Out of Food in the Last Year: Never true     Ran Out of Food in the Last Year: Never true   Transportation Needs: No Transportation Needs (1/31/2024)    PRAPARE - Transportation     Lack of Transportation (Medical): No     Lack of Transportation (Non-Medical): No   Physical Activity: Inactive (1/31/2024)    Exercise Vital Sign     Days of Exercise per Week:  2 days     Minutes of Exercise per Session: 0 min   Stress: No Stress Concern Present (1/31/2024)    [employer] - Occupational Stress Questionnaire     Feeling of Stress : Not at all   Social Connections: Moderately Isolated (1/31/2024)    Social Connection and Isolation Panel [NHANES]     Frequency of Communication with Friends and Family: Three times a week     Frequency of Social Gatherings with Friends and Family: Once a week     Attends Rastafarian Services: Never     Active Member of Clubs or Organizations: No     Attends Club or Organization Meetings: Never     Marital Status:    Housing Stability: Low Risk  (1/31/2024)    Housing Stability Vital Sign     Unable to Pay for Housing in the Last Year: No     Number of Places Lived in the Last Year: 1     Unstable Housing in the Last Year: No        Family History   Problem Relation Age of Onset    Diabetes Mother     Heart disease Mother     Hypertension Mother     Diabetes Father         Patient Care Team:  Pretty Flores FNP as PCP - General (Family Medicine)     Subjective:     Review of Systems     See HPI for details    Constitutional: Denies Change in appetite. Denies Chills. Denies Fever. Denies Night sweats.  Eye: Denies Blurred vision. Denies Discharge. Denies Eye pain.  ENT: Denies Decreased hearing. Denies Sore throat. Denies Swollen glands.  Respiratory: Denies Cough. Denies Shortness of breath. Denies Shortness of breath with exertion. Denies Wheezing.  Cardiovascular: DeniesChest pain at rest. Denies Chest pain with exertion. Denies Irregular heartbeat. Denies Palpitations. Denies Edema.  Gastrointestinal: Denies Abdominal pain. DeniesDiarrhea. Denies Nausea. Denies Vomiting. Denies Hematemesis or Hematochezia.  Genitourinary: Denies Dysuria. Denies Urinary frequency. Denies Urinary urgency. Denies Blood in urine.  Endocrine: Denies Cold intolerance. Denies Excessive thirst. Denies Heat intolerance. Denies  "Weight loss. Denies Weight gain.  Musculoskeletal: Denies Painful joints. Denies Weakness.  Integumentary: Denies Rash. Denies Itching. Denies Dry skin.  Neurologic: Denies Dizziness. Denies Fainting. Denies Headache.  Psychiatric: Denies Depression. Denies Anxiety. Denies Suicidal/Homicidal ideations.    All Other ROS: Negative except as stated in HPI.       Objective:     Visit Vitals  /72 (BP Location: Right arm, Patient Position: Sitting, BP Method: Large (Automatic))   Pulse 60   Temp 98.3 °F (36.8 °C) (Oral)   Resp 18   Ht 5' 5" (1.651 m)   Wt 86.2 kg (190 lb)   BMI 31.62 kg/m²       Physical Exam    General: Alert and oriented, No acute distress.  Head: Normocephalic, Atraumatic.  Eye: Pupils are equal, round and reactive to light, Extraocular movements are intact, Sclera non-icteric.  Ears/Nose/Throat: Normal, Mucosa moist,Clear.  Neck/Thyroid: Supple, Non-tender, No carotid bruit, No lymphadenopathy, No JVD, Full range of motion.  Respiratory: Clear to auscultation bilaterally; No wheezes, rales or rhonchi,Non-labored respirations, Symmetrical chest wall expansion.  Cardiovascular: Regular rate and rhythm, S1/S2 normal, No murmurs, rubs or gallops.  Gastrointestinal: Soft, Non-tender, Non-distended, Normal bowel sounds, No palpable organomegaly.  Musculoskeletal: Normal range of motion.  Integumentary: Warm, Dry, Intact, No suspicious lesions or rashes.  Extremities: No clubbing, cyanosis or edema  Neurologic: No focal deficits, Cranial Nerves II-XII are grossly intact, Motor strength normal upper and lower extremities, Sensory exam intact.  Psychiatric: Normal interaction, Coherent speech, Euthymic mood, Appropriate affect       Labs Reviewed:     Chemistry:  Lab Results   Component Value Date     01/29/2024    K 3.8 01/29/2024    CHLORIDE 113 (H) 01/29/2024    BUN 15.5 01/29/2024    CREATININE 0.82 01/29/2024    EGFRNORACEVR >60 01/29/2024    GLUCOSE 118 (H) 01/29/2024    CALCIUM 8.9 " 01/29/2024    ALKPHOS 93 01/29/2024    LABPROT 7.2 01/29/2024    ALBUMIN 3.8 01/29/2024    BILIDIR 0.2 03/31/2022    IBILI 0.20 03/31/2022    AST 20 01/29/2024    ALT 35 01/29/2024    MG 1.90 10/11/2023        Lab Results   Component Value Date    HGBA1C 5.7 10/23/2023        Hematology:  Lab Results   Component Value Date    WBC 7.42 01/29/2024    HGB 12.2 (L) 01/29/2024    HCT 39.4 (L) 01/29/2024     01/29/2024       Lipid Panel:  Lab Results   Component Value Date    CHOL 144 03/24/2023    HDL 32 (L) 03/24/2023    LDL 92.00 03/24/2023    TRIG 99 03/24/2023    TOTALCHOLEST 5 03/24/2023        Urine:  Lab Results   Component Value Date    COLORUA Straw 01/29/2024    APPEARANCEUA Clear 01/29/2024    SGUA 1.025 01/29/2024    PHUA 6.0 01/29/2024    PROTEINUA Negative 01/29/2024    GLUCOSEUA Negative 01/29/2024    KETONESUA Negative 01/29/2024    BLOODUA Trace (A) 01/29/2024    NITRITESUA Negative 01/29/2024    LEUKOCYTESUR Negative 01/29/2024    RBCUA 0-2 01/29/2024    WBCUA 0-2 01/29/2024    BACTERIA None Seen 01/29/2024    SQEPUA Trace (A) 01/17/2023    HYALINECASTS None Seen 01/17/2023    CREATRANDUR 57.6 (L) 10/23/2023        Assessment:       ICD-10-CM ICD-9-CM   1. Anemia, unspecified type  D64.9 285.9   2. Controlled type 2 diabetes mellitus without complication, without long-term current use of insulin  E11.9 250.00   3. History of fall  Z91.81 V15.88   4.  (ventriculoperitoneal) shunt status  Z98.2 V45.2   5. Hydrocephalus, unspecified type  G91.9 331.4   6. Prostate cancer screening  Z12.5 V76.44   7. Well adult exam  Z00.00 V70.0   8. COVID  U07.1 079.89        Plan:     1. Anemia, unspecified type  CBC in 6 months.    2. Controlled type 2 diabetes mellitus without complication, without long-term current use of insulin  A1c 5.7. ADA diet and exercise. Controlled with diet and exercise. Urine microalbumin 10-23-23. DM foot done 4-26-23. DM eye done 10-23-23. Pt refused statin therapy.      3.  History of fall  Encouraged fall precautions. Education provided.     4.  (ventriculoperitoneal) shunt status   Pt had CT head done 1-30-23 showing  CT Head Without Contrast  Order: [id number]  Status: Final result     Visible to patient: Yes (not seen)     Next appt: 03/01/2023 at 11:30 AM in Urology (Andreea Shauna, )     Dx: Dizziness; Hydrocephalus, unspecified...     0 Result Notes  Details     Reading Physician Reading Date Result Priority   Luis Blanco MD  [phone] 1/30/2023 Routine      Narrative & Impression  EXAMINATION:  CT HEAD WITHOUT CONTRAST     CLINICAL HISTORY:  57-year-old man for follow-up of hydrocephalus.     TECHNIQUE:  Thin slice noncontrast CT imaging was performed from below the skull base through the skull vertex per routine protocol and with coronal and sagittal reformatted images generated from the axial data set.  Automatic dose modulation and/or weight based mA/kv utilized to achieve as low as reasonable radiation dose.     COMPARISON:  Head CT without contrast 08/05/2020.     FINDINGS:  There are bilateral parietal approach ventriculoperitoneal shunt catheters remaining unchanged in position.  The ventricles are similar in morphology to the prior CT exam with diffusely dilated lateral ventricles and normal size 3rd and 4th ventricles.  No acute parenchymal abnormality is identified.  There is no intracranial hemorrhage or abnormal fluid collection.  No posterior fossa pathology is identified.  There is no significant extracranial soft tissue abnormality.  There is no acute osseous abnormality or osseous lesion.  Chronic thick periosteal reaction along the inner table of the left calvarium is unchanged in the interval.  The paranasal sinuses are well developed with mild, thin mucosal thickening at the bilateral ethmoid sinuses and several mucous retention cysts in the bilateral maxillary sinuses.  No paranasal sinus fluid.  The well pneumatized bilateral mastoids and middle  ears are clear.     Impression:     Chronic dilation of the lateral ventricles which is unchanged since 08/05/2020 with bilateral ventriculoperitoneal shunts remaining in place and with no acute pathology at the head or significant change since 08/05/2020.     Mild chronic sinusitis at the ethmoids and maxillary sinuses.        Electronically signed by: Luis Blanco  Date:                                            01/30/2023  Time:                                           10:34             Exam Ended: 01/30/23 10:24 Last Resulted: 01/30/23 10:34              5. Hydrocephalus, unspecified type   Pt had CT head done 1-30-23 showing  CT Head Without Contrast  Order: [id number]  Status: Final result     Visible to patient: Yes (not seen)     Next appt: 03/01/2023 at 11:30 AM in Urology (Andreea Villatoro DO)     Dx: Dizziness; Hydrocephalus, unspecified...     0 Result Notes  Details     Reading Physician Reading Date Result Priority   Luis Blanco MD  [phone] 1/30/2023 Routine      Narrative & Impression  EXAMINATION:  CT HEAD WITHOUT CONTRAST     CLINICAL HISTORY:  57-year-old man for follow-up of hydrocephalus.     TECHNIQUE:  Thin slice noncontrast CT imaging was performed from below the skull base through the skull vertex per routine protocol and with coronal and sagittal reformatted images generated from the axial data set.  Automatic dose modulation and/or weight based mA/kv utilized to achieve as low as reasonable radiation dose.     COMPARISON:  Head CT without contrast 08/05/2020.     FINDINGS:  There are bilateral parietal approach ventriculoperitoneal shunt catheters remaining unchanged in position.  The ventricles are similar in morphology to the prior CT exam with diffusely dilated lateral ventricles and normal size 3rd and 4th ventricles.  No acute parenchymal abnormality is identified.  There is no intracranial hemorrhage or abnormal fluid collection.  No posterior fossa pathology is identified.   There is no significant extracranial soft tissue abnormality.  There is no acute osseous abnormality or osseous lesion.  Chronic thick periosteal reaction along the inner table of the left calvarium is unchanged in the interval.  The paranasal sinuses are well developed with mild, thin mucosal thickening at the bilateral ethmoid sinuses and several mucous retention cysts in the bilateral maxillary sinuses.  No paranasal sinus fluid.  The well pneumatized bilateral mastoids and middle ears are clear.     Impression:     Chronic dilation of the lateral ventricles which is unchanged since 08/05/2020 with bilateral ventriculoperitoneal shunts remaining in place and with no acute pathology at the head or significant change since 08/05/2020.     Mild chronic sinusitis at the ethmoids and maxillary sinuses.        Electronically signed by: Luis Blanco  Date:                                            01/30/2023  Time:                                           10:34             Exam Ended: 01/30/23 10:24 Last Resulted: 01/30/23 10:34              6. Prostate cancer screening  UTD PSA.    7. Well adult exam  Labs in 6 months. UTD PSA. Colonoscopy done with [facility] May 2024- will re-address on next visit.      8. Covid  Pt was diagnosed with COVID 1-29-24. Pt states doing much better. Encouraged social distancing and good handwashing to prevent spread of infection.    Follow up in about 6 months (around 8/7/2024) for with labs 1 week prior to appt. . In addition to their scheduled follow up, the patient has also been instructed to follow up on as needed basis.     Future Appointments   Date Time Provider Department Center   5/2/2024  2:45 PM Andreea Flores DO [facility] [place] [facility] Boudreaux, FNP

## 2024-02-20 ENCOUNTER — TELEPHONE (OUTPATIENT)
Dept: UROLOGY | Facility: HOSPITAL | Age: 59
End: 2024-02-20
Payer: MEDICARE

## 2024-02-20 DIAGNOSIS — R35.0 URINARY FREQUENCY: ICD-10-CM

## 2024-02-20 DIAGNOSIS — R39.15 URINARY URGENCY: ICD-10-CM

## 2024-02-20 RX ORDER — OXYBUTYNIN CHLORIDE 10 MG/1
10 TABLET, EXTENDED RELEASE ORAL DAILY
Qty: 90 TABLET | Refills: 3 | Status: SHIPPED | OUTPATIENT
Start: 2024-02-20 | End: 2025-02-19

## 2024-02-20 NOTE — TELEPHONE ENCOUNTER
----- Message from Jessenia Yañez RN sent at 2/19/2024 10:47 AM CST -----  Regarding: FW: PATIENT CALLED  Pt needs refill on Oxybutynin. Thanks!  ----- Message -----  From: Torrie Pate  Sent: 2/19/2024  10:43 AM CST  To: Jessenia Yañez RN  Subject: PATIENT CALLED                                   Fide Ruelas,    Mr. Kaur's wife called this morning stating he has been out of his Oxybutynin for two weeks now, and he is in pain. If a prescription can be written, please let me know, and I don't mind calling his wife back to let her know it has been ordered.      Thanks,  Torrie

## 2024-03-09 ENCOUNTER — OFFICE VISIT (OUTPATIENT)
Dept: URGENT CARE | Facility: CLINIC | Age: 59
End: 2024-03-09
Payer: MEDICARE

## 2024-03-09 VITALS
HEART RATE: 61 BPM | TEMPERATURE: 98 F | DIASTOLIC BLOOD PRESSURE: 73 MMHG | HEIGHT: 65 IN | SYSTOLIC BLOOD PRESSURE: 111 MMHG | RESPIRATION RATE: 20 BRPM | OXYGEN SATURATION: 98 % | BODY MASS INDEX: 29.99 KG/M2 | WEIGHT: 180 LBS

## 2024-03-09 DIAGNOSIS — M25.561 ACUTE PAIN OF RIGHT KNEE: Primary | ICD-10-CM

## 2024-03-09 PROCEDURE — 99213 OFFICE O/P EST LOW 20 MIN: CPT | Mod: 25,,,

## 2024-03-09 PROCEDURE — 96372 THER/PROPH/DIAG INJ SC/IM: CPT | Mod: ,,,

## 2024-03-09 RX ORDER — DEXAMETHASONE SODIUM PHOSPHATE 100 MG/10ML
8 INJECTION INTRAMUSCULAR; INTRAVENOUS
Status: COMPLETED | OUTPATIENT
Start: 2024-03-09 | End: 2024-03-09

## 2024-03-09 RX ADMIN — DEXAMETHASONE SODIUM PHOSPHATE 8 MG: 100 INJECTION INTRAMUSCULAR; INTRAVENOUS at 09:03

## 2024-03-09 NOTE — PROGRESS NOTES
"Subjective:      Patient ID: Jude Kaur Jr. is a 58 y.o. male.    Vitals:  height is 5' 5" (1.651 m) and weight is 81.6 kg (180 lb). His oral temperature is 97.7 °F (36.5 °C). His blood pressure is 111/73 and his pulse is 61. His respiration is 20 and oxygen saturation is 98%.     Chief Complaint: Knee Pain (Right knee pain and swelling radiating down calf x 3 weeks. No known injury. No relief from Advil. )    A 57 y/o male with a pmh of hydrocephalus,  shunt, HTN, and pre-diabetes presents to the clinic with c/o right knee pain and swelling x 3 weeks. He denies any known injury, fall, or trauma. He denies any redness, warmth, calf pain, lower leg swelling, difficulty walking, weakness of the lower extremity, sensation, changes, temperature changes, sob, cp, n/v/d, abdominal complaints, rash, difficulty swallowing, neck stiffness, or changes in intake or output. He denies any recent hospitalizations, surgeries, or decrease in activity recently.       Knee Pain         Constitution: Negative for fever.   HENT: Negative.     Neck: neck negative.   Eyes: Negative.    Respiratory: Negative.     Gastrointestinal: Negative.    Genitourinary: Negative.    Musculoskeletal:  Positive for pain, joint pain and joint swelling. Negative for trauma and abnormal ROM of joint.   Skin:  Negative for erythema.      Objective:     Physical Exam   Constitutional: He is oriented to person, place, and time. He appears well-developed. He is cooperative.  Non-toxic appearance. He does not appear ill. No distress.   HENT:   Head: Normocephalic and atraumatic.   Ears:   Right Ear: Hearing and external ear normal.   Left Ear: Hearing and external ear normal.   Mouth/Throat: Mucous membranes are normal. Mucous membranes are moist. Oropharynx is clear.   Eyes: Conjunctivae and lids are normal.   Neck: Trachea normal and phonation normal. Neck supple. No edema present. No erythema present. No neck rigidity present.   Cardiovascular: " Normal rate, regular rhythm and normal heart sounds.   Pulmonary/Chest: Effort normal and breath sounds normal. No stridor. No respiratory distress. He has no decreased breath sounds. He has no wheezes. He has no rhonchi. He has no rales.   Abdominal: Normal appearance.   Musculoskeletal:      Right knee: He exhibits swelling (mild patellar effusion noted; no warmth, or notible erythema to the knee, calf or shin) and effusion. He exhibits normal range of motion, no ecchymosis, no deformity, no erythema, normal patellar mobility and no bony tenderness. Tenderness found. Patellar tendon tenderness noted.      Left knee: Normal.      Comments: Negative homans to the right and left      Neurological: He is alert and oriented to person, place, and time. He exhibits normal muscle tone.   Skin: Skin is warm, intact and not diaphoretic. Capillary refill takes less than 2 seconds. No bruising and No erythema   Psychiatric: His speech is normal and behavior is normal. Mood normal.   Nursing note and vitals reviewed.      Assessment:     1. Acute pain of right knee        Plan:       Acute pain of right knee  -     XR KNEE 3 VIEW RIGHT    Other orders  -     dexAMETHasone injection 8 mg    Preliminary knee xray negative; will call with any changes in the official report  Wells criteria -2 points for DVT    Patient is pre-diabetic and not on any medications, last documented CBG was 118 and A1C was 5.7; risks of steroids discussed today, pt and fly v/u    Drink plenty of fluids and get plenty of rest.  Take medication with food (motrin)   Avoid strenuous lifting, use proper body mechanics.  Apply heating pad, Ice pack, Biofreeze or Epsom salt baths as needed.  Follow-up with your primary care doctor as needed. If pain continues over the next 7-10 days, call the clinic and we can send an orthopedic referral   Present to the Emergency Department with any significant change or worsening symptoms including calf pain, redness or  warmth, sudden increase in swelling or pain, fever, or difficulty walking.

## 2024-03-18 LAB
LEFT EYE DM RETINOPATHY: NEGATIVE
RIGHT EYE DM RETINOPATHY: NEGATIVE

## 2024-03-27 DIAGNOSIS — N40.1 BPH WITH OBSTRUCTION/LOWER URINARY TRACT SYMPTOMS: ICD-10-CM

## 2024-03-27 DIAGNOSIS — N13.8 BPH WITH OBSTRUCTION/LOWER URINARY TRACT SYMPTOMS: ICD-10-CM

## 2024-03-27 RX ORDER — TAMSULOSIN HYDROCHLORIDE 0.4 MG/1
1 CAPSULE ORAL DAILY
Qty: 90 CAPSULE | Refills: 3 | Status: SHIPPED | OUTPATIENT
Start: 2024-03-27 | End: 2024-06-05 | Stop reason: SDUPTHER

## 2024-04-17 ENCOUNTER — CLINICAL SUPPORT (OUTPATIENT)
Dept: URGENT CARE | Facility: CLINIC | Age: 59
End: 2024-04-17
Payer: MEDICARE

## 2024-04-17 VITALS
SYSTOLIC BLOOD PRESSURE: 113 MMHG | OXYGEN SATURATION: 99 % | HEIGHT: 65 IN | BODY MASS INDEX: 29.99 KG/M2 | WEIGHT: 180 LBS | DIASTOLIC BLOOD PRESSURE: 69 MMHG | RESPIRATION RATE: 18 BRPM | TEMPERATURE: 99 F | HEART RATE: 60 BPM

## 2024-04-17 DIAGNOSIS — M54.42 ACUTE LEFT-SIDED LOW BACK PAIN WITH LEFT-SIDED SCIATICA: Primary | ICD-10-CM

## 2024-04-17 PROCEDURE — 96372 THER/PROPH/DIAG INJ SC/IM: CPT | Mod: ,,, | Performed by: NURSE PRACTITIONER

## 2024-04-17 PROCEDURE — 99213 OFFICE O/P EST LOW 20 MIN: CPT | Mod: 25,,, | Performed by: NURSE PRACTITIONER

## 2024-04-17 RX ORDER — CYCLOBENZAPRINE HCL 10 MG
10 TABLET ORAL 3 TIMES DAILY PRN
Qty: 30 TABLET | Refills: 0 | Status: SHIPPED | OUTPATIENT
Start: 2024-04-17 | End: 2024-04-27

## 2024-04-17 RX ORDER — KETOROLAC TROMETHAMINE 30 MG/ML
30 INJECTION, SOLUTION INTRAMUSCULAR; INTRAVENOUS
Status: COMPLETED | OUTPATIENT
Start: 2024-04-17 | End: 2024-04-17

## 2024-04-17 RX ADMIN — KETOROLAC TROMETHAMINE 30 MG: 30 INJECTION, SOLUTION INTRAMUSCULAR; INTRAVENOUS at 05:04

## 2024-04-17 NOTE — PROGRESS NOTES
"Subjective:      Patient ID: Jude Kaur Jr. is a 58 y.o. male.    Vitals:  height is 5' 5" (1.651 m) and weight is 81.6 kg (180 lb). His temperature is 98.5 °F (36.9 °C). His blood pressure is 113/69 and his pulse is 60. His respiration is 18 and oxygen saturation is 99%.     Chief Complaint: Pain    58 y.o. male presents to clinic with c/o left lower back pain radiating down left leg starting Monday evening.  did do work on a ladder while washing his truck on Monday, pain started that evening.  Pain reproducible with movement.  No recent falls or trauma      Musculoskeletal:  Positive for back pain.      Objective:     Physical Exam   Constitutional: He is oriented to person, place, and time. He appears well-developed. He is cooperative. No distress.   HENT:   Head: Normocephalic and atraumatic.   Nose: Nose normal.   Mouth/Throat: Oropharynx is clear and moist and mucous membranes are normal.   Eyes: Conjunctivae and lids are normal.   Neck: Trachea normal and phonation normal. Neck supple.   Cardiovascular: Normal rate, regular rhythm, normal heart sounds and normal pulses.   Pulmonary/Chest: Effort normal and breath sounds normal.   Abdominal: Normal appearance and bowel sounds are normal. He exhibits no mass. Soft.   Musculoskeletal:         General: No deformity.   Neurological: He is alert and oriented to person, place, and time. He has normal strength and normal reflexes. No sensory deficit.   Skin: Skin is warm, dry, intact and not diaphoretic.        Psychiatric: His speech is normal and behavior is normal. Judgment and thought content normal.   Nursing note and vitals reviewed.      Assessment:     1. Acute left-sided low back pain with left-sided sciatica        Plan:   Modify activity and gentle stretching  Take prescription medication as directed Flexeril or  Tylenol OTC as directed for pain  May take ibuprofen in 2 days if symptoms continue or worsen  Follow-up with your primary care provider if " symptoms worsen or persist as instructed     Acute left-sided low back pain with left-sided sciatica  -     ketorolac injection 30 mg  -     cyclobenzaprine (FLEXERIL) 10 MG tablet; Take 1 tablet (10 mg total) by mouth 3 (three) times daily as needed for Muscle spasms.  Dispense: 30 tablet; Refill: 0

## 2024-04-17 NOTE — PATIENT INSTRUCTIONS
Modify activity and gentle stretching  Take prescription medication as directed Flexeril or  Tylenol OTC as directed for pain  May take ibuprofen in 2 days if symptoms continue or worsen  Follow-up with your primary care provider if symptoms worsen or persist as instructed

## 2024-05-02 ENCOUNTER — OFFICE VISIT (OUTPATIENT)
Dept: UROLOGY | Facility: CLINIC | Age: 59
End: 2024-05-02
Payer: MEDICARE

## 2024-05-02 VITALS
RESPIRATION RATE: 20 BRPM | DIASTOLIC BLOOD PRESSURE: 67 MMHG | SYSTOLIC BLOOD PRESSURE: 110 MMHG | TEMPERATURE: 98 F | HEIGHT: 65 IN | HEART RATE: 60 BPM | BODY MASS INDEX: 31.13 KG/M2 | OXYGEN SATURATION: 100 % | WEIGHT: 186.81 LBS

## 2024-05-02 DIAGNOSIS — R35.0 URINARY FREQUENCY: ICD-10-CM

## 2024-05-02 DIAGNOSIS — N13.8 BPH WITH OBSTRUCTION/LOWER URINARY TRACT SYMPTOMS: Primary | ICD-10-CM

## 2024-05-02 DIAGNOSIS — N40.1 BPH WITH OBSTRUCTION/LOWER URINARY TRACT SYMPTOMS: Primary | ICD-10-CM

## 2024-05-02 DIAGNOSIS — N52.8 OTHER MALE ERECTILE DYSFUNCTION: ICD-10-CM

## 2024-05-02 LAB
BILIRUB SERPL-MCNC: NEGATIVE MG/DL
BLOOD URINE, POC: NORMAL
COLOR, POC UA: YELLOW
GLUCOSE UR QL STRIP: NEGATIVE
KETONES UR QL STRIP: NEGATIVE
LEUKOCYTE ESTERASE URINE, POC: NEGATIVE
NITRITE, POC UA: NEGATIVE
PH, POC UA: 5.5
POC RESIDUAL URINE VOLUME: 142 ML (ref 0–100)
PROTEIN, POC: NEGATIVE
SPECIFIC GRAVITY, POC UA: 1.02
UROBILINOGEN, POC UA: 0.2

## 2024-05-02 PROCEDURE — 81001 URINALYSIS AUTO W/SCOPE: CPT | Mod: PBBFAC | Performed by: UROLOGY

## 2024-05-02 PROCEDURE — 99214 OFFICE O/P EST MOD 30 MIN: CPT | Mod: PBBFAC | Performed by: UROLOGY

## 2024-05-02 PROCEDURE — 51798 US URINE CAPACITY MEASURE: CPT | Mod: PBBFAC | Performed by: UROLOGY

## 2024-05-02 PROCEDURE — 99214 OFFICE O/P EST MOD 30 MIN: CPT | Mod: S$PBB,,, | Performed by: UROLOGY

## 2024-05-02 RX ORDER — TADALAFIL 20 MG/1
20 TABLET ORAL DAILY PRN
Qty: 30 TABLET | Refills: 11 | Status: SHIPPED | OUTPATIENT
Start: 2024-05-02 | End: 2025-05-02

## 2024-05-02 NOTE — PROGRESS NOTES
Placed in room. Seen by Dr. Tatum. Spoke with patient. Bladder scan at 142 ml. RTC in 6 months with a PSA.

## 2024-05-02 NOTE — PROGRESS NOTES
CC:  Six month    HPI:  Jude Kaur Jr. is a 58 y.o. male seen for six month follow-up.  He was having urinary frequency and nocturia up to five times.  I placed him on oxybutynin in since then he has had decreased daytime urinary frequency and nocturia has been one to two times usually and occasionally three times.  Recently the nocturia has increased up to five times on occasion but he states that he does drink a lot of water and drinks right up to bedtime.  He is on tamsulosin for BPH.  He complains of erectile dysfunction.  He states that this occurred about a month ago.  He says he was having normal erections and then over the last month he stopped having any erections at all.      Bladder scan residual:  142 ml    Urinalysis:  Results for orders placed or performed in visit on 01/22/24   POCT URINE DIPSTICK WITH MICROSCOPE, AUTOMATED   Result Value Ref Range    Color, UA Yellow     Spec Grav UA 1.025     pH, UA 6.0     WBC, UA neg     Nitrite, UA neg     Protein, POC neg     Glucose, UA neg     Ketones, UA neg     Urobilinogen, UA 0.2     Bilirubin, POC neg     Blood, UA trace-intact      Microscopic Urinalysis:  WBC:   None per HPF     RBC:    0-1 per HPF     Bacteria:    None per HPF     Squamous epithelial cells:    None per HPF      Crystals:   None    Lab Results:  PSA History:    05/18/17 10:50 06/18/18 09:25 07/17/19 10:05 07/15/20 09:39 04/19/21 08:28 10/17/22 09:36 11/02/23 15:41   1.5 1.8 2.3 2.9 2.54 3.03 3.16     ROS:  All systems reviewed and are negative except as documented in HPI and/or Assessment and Plan.     Patient Active Problem List:     Patient Active Problem List   Diagnosis    Anemia    Controlled type 2 diabetes mellitus without complication, without long-term current use of insulin    Hematuria    History of fall    Hydrocephalus    Loss of balance    Obesity    Pain in head     (ventriculoperitoneal) shunt status    Prostate cancer screening    Well adult exam    COVID     "Acute left-sided low back pain with left-sided sciatica        Past Medical History:  Past Medical History:   Diagnosis Date    BPH (benign prostatic hyperplasia)     No known health problems         Past Surgical History:  Past Surgical History:   Procedure Laterality Date    COLONOSCOPY  05/28/2014    Gonsalo Gonzalez MD    INSERTION OF SHUNT      "shunt in head as a baby"        Family History:  Family History   Problem Relation Name Age of Onset    Diabetes Mother Maria C garcia     Heart disease Mother Maria C garcia     Hypertension Mother Maria C garcia     Diabetes Father Jude garcia sr         Social History:  Social History     Socioeconomic History    Marital status:    Tobacco Use    Smoking status: Never     Passive exposure: Never    Smokeless tobacco: Never   Substance and Sexual Activity    Alcohol use: Never    Drug use: Never    Sexual activity: Yes     Partners: Female     Birth control/protection: None     Social Determinants of Health     Financial Resource Strain: Low Risk  (1/31/2024)    Overall Financial Resource Strain (CARDIA)     Difficulty of Paying Living Expenses: Not very hard   Food Insecurity: No Food Insecurity (1/31/2024)    Hunger Vital Sign     Worried About Running Out of Food in the Last Year: Never true     Ran Out of Food in the Last Year: Never true   Transportation Needs: No Transportation Needs (1/31/2024)    PRAPARE - Transportation     Lack of Transportation (Medical): No     Lack of Transportation (Non-Medical): No   Physical Activity: Inactive (1/31/2024)    Exercise Vital Sign     Days of Exercise per Week: 2 days     Minutes of Exercise per Session: 0 min   Stress: No Stress Concern Present (1/31/2024)    [employer] [place] - Occupational Stress Questionnaire     Feeling of Stress : Not at all   Housing Stability: Low Risk  (1/31/2024)    Housing Stability Vital Sign     Unable to Pay for Housing in the Last Year: No     Number of " Places Lived in the Last Year: 1     Unstable Housing in the Last Year: No        Allergies:  Review of patient's allergies indicates:  No Known Allergies     Objective:  Vitals:    05/02/24 1514   BP: 110/67   Pulse: 60   Resp: 20   Temp: 97.9 °F (36.6 °C)     General:  Well developed, well nourished adult male in no acute distress  Abdomen: Soft, nontender, no masses  Extremities:  No clubbing, cyanosis, or edema  Neurologic:  Grossly intact  Musculoskeletal:  Normal tone    Last BLANCA:  November 2023    Assessment:  1. BPH with obstruction/lower urinary tract symptoms  - POCT URINE DIPSTICK WITH MICROSCOPE, AUTOMATED  - POCT Bladder Scan    2. Urinary frequency    3. Other male erectile dysfunction     Plan:  Continue tamsulosin.  Continue Oxybutynin.  Decrease fluid intake prior to bedtime.   Given Tadalafil.      Follow-up:  PSA in six months and follow-up after.

## 2024-05-13 PROBLEM — Z00.00 WELL ADULT EXAM: Status: RESOLVED | Noted: 2024-02-07 | Resolved: 2024-05-13

## 2024-05-15 ENCOUNTER — HOSPITAL ENCOUNTER (EMERGENCY)
Facility: HOSPITAL | Age: 59
Discharge: HOME | End: 2024-05-15
Attending: INTERNAL MEDICINE
Payer: MEDICARE

## 2024-05-15 VITALS
SYSTOLIC BLOOD PRESSURE: 157 MMHG | DIASTOLIC BLOOD PRESSURE: 74 MMHG | RESPIRATION RATE: 18 BRPM | BODY MASS INDEX: 30.49 KG/M2 | HEART RATE: 57 BPM | HEIGHT: 65 IN | WEIGHT: 183 LBS | TEMPERATURE: 98 F | OXYGEN SATURATION: 100 %

## 2024-05-15 DIAGNOSIS — R33.8 URINARY RETENTION DUE TO BENIGN PROSTATIC HYPERPLASIA: Primary | ICD-10-CM

## 2024-05-15 DIAGNOSIS — N40.1 URINARY RETENTION DUE TO BENIGN PROSTATIC HYPERPLASIA: Primary | ICD-10-CM

## 2024-05-15 LAB
BACTERIA #/AREA URNS AUTO: ABNORMAL /HPF
BILIRUB UR QL STRIP.AUTO: NEGATIVE
CLARITY UR: CLEAR
COLOR UR AUTO: ABNORMAL
GLUCOSE UR QL STRIP: NORMAL
HGB UR QL STRIP: NEGATIVE
HYALINE CASTS #/AREA URNS LPF: ABNORMAL /LPF
KETONES UR QL STRIP: NEGATIVE
LEUKOCYTE ESTERASE UR QL STRIP: NEGATIVE
MUCOUS THREADS URNS QL MICRO: ABNORMAL /LPF
NITRITE UR QL STRIP: NEGATIVE
PH UR STRIP: 7 [PH]
PROT UR QL STRIP: NEGATIVE
RBC #/AREA URNS AUTO: ABNORMAL /HPF
SP GR UR STRIP.AUTO: 1.02 (ref 1–1.03)
SQUAMOUS #/AREA URNS LPF: ABNORMAL /HPF
UROBILINOGEN UR STRIP-ACNC: NORMAL
WBC #/AREA URNS AUTO: ABNORMAL /HPF

## 2024-05-15 PROCEDURE — 81001 URINALYSIS AUTO W/SCOPE: CPT | Performed by: PHYSICIAN ASSISTANT

## 2024-05-15 PROCEDURE — 99282 EMERGENCY DEPT VISIT SF MDM: CPT

## 2024-05-15 NOTE — ED PROVIDER NOTES
"Encounter Date: 5/15/2024       History     Chief Complaint   Patient presents with    Urinary Frequency     URINARY FREQUENCY WITH BURNING OVER THE LAST FEW MONTHS. ALSO STATES DRIBBLING WITH CESSATION OF URINATING. RATES PAIN 8/10 AT THIS TIME. ANJUMS. NADN     58 y.o. male with a PMH  shunt, obstructive BPH, HTN, presents to [facility] with a chief c/o of urinary frequency and dysuria. Patient has had these symptoms for a few months, the dysuria is described as burning. He conveys bowel movements at times are painful which is new over the last month. Patient saw urologist 5/13/24, plan included continuing his medications and decreasing fluid intake at night. Patient reports his symptoms have not improved, urinary frequency and nocturia 5-6 times a night. Patient is currently on oxybutynin and tamsulosin. He denies fever, abdominal pain, nausea, vomiting, flank pain, diarrhea, incontinence of bowel and bladder. No other complaints at this time.     Bladder scan 300    The history is provided by the patient. No  was used.     Review of patient's allergies indicates:  No Known Allergies  Past Medical History:   Diagnosis Date    BPH (benign prostatic hyperplasia)     No known health problems      Past Surgical History:   Procedure Laterality Date    COLONOSCOPY  05/28/2014    Gonsalo Gonzalez MD    INSERTION OF SHUNT      "shunt in head as a baby"     Family History   Problem Relation Name Age of Onset    Diabetes Mother Maria C garcia     Heart disease Mother Maria C garcia     Hypertension Mother Maria C garcia     Diabetes Father Jude garcia sr      Social History     Tobacco Use    Smoking status: Never     Passive exposure: Never    Smokeless tobacco: Never   Substance Use Topics    Alcohol use: Never    Drug use: Never     Review of Systems   Constitutional: Negative.    HENT: Negative.     Eyes: Negative.    Respiratory: Negative.     Cardiovascular: Negative.    Gastrointestinal: Negative.  " Negative for abdominal distention, abdominal pain, blood in stool, constipation, diarrhea, nausea, rectal pain and vomiting.   Genitourinary:  Positive for difficulty urinating, dysuria and frequency. Negative for decreased urine volume, enuresis, flank pain, hematuria, penile discharge, penile pain, penile swelling, scrotal swelling, testicular pain and urgency.   Musculoskeletal: Negative.    Skin: Negative.    Neurological: Negative.        Physical Exam     Initial Vitals [05/15/24 1212]   BP Pulse Resp Temp SpO2   (!) 127/93 64 18 98 °F (36.7 °C) 99 %      MAP       --         Physical Exam    Constitutional: He appears well-developed and well-nourished.  Non-toxic appearance. He does not have a sickly appearance. He does not appear ill. No distress.   HENT:   Head: Normocephalic and atraumatic.   Eyes: Pupils are equal, round, and reactive to light.   Neck: Trachea normal and phonation normal. Neck supple.   Normal range of motion.  Cardiovascular:  Normal rate, regular rhythm, intact distal pulses and normal pulses.    PMI is not displaced.        Pulmonary/Chest: Effort normal and breath sounds normal. No respiratory distress. He has no wheezes. He has no rales.   Abdominal: Abdomen is soft. Bowel sounds are normal. There is no abdominal tenderness.   Abdomen is soft, nontender, no peritoneal signs. Tolerating PO fluids.      No right CVA tenderness.  No left CVA tenderness. There is no rebound, no guarding, no tenderness at McBurney's point and negative Kennedy's sign. negative Rovsing's sign  Genitourinary:    Penis normal.   No phimosis, paraphimosis, hypospadias, penile erythema or penile tenderness. No discharge found.   Musculoskeletal:         General: Normal range of motion.      Cervical back: Normal range of motion and neck supple.     Neurological: He is alert and oriented to person, place, and time. He has normal strength. No cranial nerve deficit or sensory deficit. GCS score is 15. GCS eye  subscore is 4. GCS verbal subscore is 5. GCS motor subscore is 6.   Skin: Skin is warm and dry. Capillary refill takes less than 2 seconds.   Psychiatric: He has a normal mood and affect.         ED Course   Procedures  Labs Reviewed   URINALYSIS, REFLEX TO URINE CULTURE - Abnormal; Notable for the following components:       Result Value    Squamous Epithelial Cells, UA Trace (*)     Mucous, UA Trace (*)     All other components within normal limits          Imaging Results    None          Medications - No data to display  Medical Decision Making  58 y.o. male with a PMH  shunt, obstructive BPH, HTN, presents to [facility] with a chief c/o of urinary frequency and dysuria. Patient has had these symptoms for a few months, the dysuria is described as burning. He conveys bowel movements at times are painful which is new over the last month. Patient saw urologist 5/13/24, plan included continuing his medications and decreasing fluid intake at night. Patient reports his symptoms have not improved, urinary frequency and nocturia 5-6 times a night. Patient is currently on oxybutynin and tamsulosin. He denies fever, abdominal pain, nausea, vomiting, flank pain, diarrhea, incontinence of bowel and bladder. No other complaints at this time.     Bladder scan 300    Urethritis, cystitis, pyelonephritis, herpes, candidiasis, among others      Amount and/or Complexity of Data Reviewed  Labs:  Decision-making details documented in ED Course.               ED Course as of 05/15/24 1618   Wed May 15, 2024   1355 Urinalysis, Reflex to Urine Culture(!) [RQ]   1356 Squamous Epithelial Cells, UA(!): Trace  Squamous Epithelial Cells, UA Trace  Mucous, UA Trace     [RQ]   1505 Discussed with the patient urinary catheter, risks and benefits. Patient has doubled his urine retention in the last 48 hours, his symptoms are worsening. Patient is agreeable to urinary catheter and follow up with his urologist. He is nontoxic appearing in no acute  distress.  [RQ]   1540 Second discussion with patient and  his wife Shirley about the recommendation to place a urinary catheter. Patient wife states she called the urology clinic today and the patient has an appointment on 5/16/24 with the urologist. Both agreed to move forward with the urinary catheter and leg bag.  [RQ]      ED Course User Index  [RQ] Alisson Kaplan FNP                           Clinical Impression:  Final diagnoses:  [N40.1, R33.8] Urinary retention due to benign prostatic hyperplasia (Primary)          ED Disposition Condition    Discharge Stable          ED Prescriptions    None       Follow-up Information       Follow up With Specialties Details Why Contact Info    Pretty Flores FNP Family Medicine In 3 days  [address] [facility] [address]  [phone]      [facility] Emergency Medicine  If symptoms worsen [address] [facility] [id number] [phone]             Alisson Kaplan FNP  05/15/24 9261       Alisson Kaplan FNP  05/15/24 [id number]

## 2024-05-16 ENCOUNTER — OFFICE VISIT (OUTPATIENT)
Dept: UROLOGY | Facility: CLINIC | Age: 59
End: 2024-05-16
Payer: MEDICARE

## 2024-05-16 VITALS
OXYGEN SATURATION: 98 % | BODY MASS INDEX: 30.72 KG/M2 | HEART RATE: 51 BPM | DIASTOLIC BLOOD PRESSURE: 84 MMHG | HEIGHT: 65 IN | WEIGHT: 184.38 LBS | SYSTOLIC BLOOD PRESSURE: 129 MMHG | TEMPERATURE: 98 F | RESPIRATION RATE: 20 BRPM

## 2024-05-16 DIAGNOSIS — Z97.8 INDWELLING FOLEY CATHETER PRESENT: ICD-10-CM

## 2024-05-16 DIAGNOSIS — R35.0 URINARY FREQUENCY: Primary | ICD-10-CM

## 2024-05-16 PROCEDURE — 99214 OFFICE O/P EST MOD 30 MIN: CPT | Mod: S$PBB,,, | Performed by: UROLOGY

## 2024-05-16 PROCEDURE — 99214 OFFICE O/P EST MOD 30 MIN: CPT | Mod: PBBFAC | Performed by: UROLOGY

## 2024-05-16 NOTE — PROGRESS NOTES
Pt seen by Dr. Flores; Pt instructed to return to clinic on Monday for nurse visit for voiding trial; Discharge paperwork given w/pt verbalizing understanding

## 2024-05-16 NOTE — PROGRESS NOTES
"CC:  Catheter management    HPI:  Jude Kaur Jr. is a 58 y.o. male seen for follow-up of ER visit.  He began having problems with constant urinary frequency.  He went to the emergency room on 15 May 2024 and they put an indwelling Velázquez catheter in.  Bladder scan showed 300 cc.  He is on tamsulosin 0.4 mg and oxybutynin.    Data Review:  ER note from 15 April 2024 by Xavi Eldridge MD.  Urinalysis.     ROS:  All systems reviewed and are negative except as documented in HPI and/or Assessment and Plan.     Patient Active Problem List:     Patient Active Problem List   Diagnosis    Anemia    Controlled type 2 diabetes mellitus without complication, without long-term current use of insulin    Hematuria    History of fall    Hydrocephalus    Loss of balance    Obesity    Pain in head     (ventriculoperitoneal) shunt status    Prostate cancer screening    COVID    Acute left-sided low back pain with left-sided sciatica        Past Medical History:  Past Medical History:   Diagnosis Date    BPH (benign prostatic hyperplasia)     No known health problems         Past Surgical History:  Past Surgical History:   Procedure Laterality Date    COLONOSCOPY  05/28/2014    Gonsalo Gonzalez MD    INSERTION OF SHUNT      "shunt in head as a baby"        Family History:  Family History   Problem Relation Name Age of Onset    Diabetes Mother Maria C kaur     Heart disease Mother Maria C kaur     Hypertension Mother Maria C kaur     Diabetes Father Jude kaur sr         Social History:  Social History     Socioeconomic History    Marital status:    Tobacco Use    Smoking status: Never     Passive exposure: Never    Smokeless tobacco: Never   Substance and Sexual Activity    Alcohol use: Never    Drug use: Never    Sexual activity: Yes     Partners: Female     Birth control/protection: None     Social Determinants of Health     Financial Resource Strain: Low Risk  (1/31/2024)    Overall Financial Resource " Strain (CARDIA)     Difficulty of Paying Living Expenses: Not very hard   Food Insecurity: No Food Insecurity (1/31/2024)    Hunger Vital Sign     Worried About Running Out of Food in the Last Year: Never true     Ran Out of Food in the Last Year: Never true   Transportation Needs: No Transportation Needs (1/31/2024)    PRAPARE - Transportation     Lack of Transportation (Medical): No     Lack of Transportation (Non-Medical): No   Physical Activity: Inactive (1/31/2024)    Exercise Vital Sign     Days of Exercise per Week: 2 days     Minutes of Exercise per Session: 0 min   Stress: No Stress Concern Present (1/31/2024)    [employer] - Occupational Stress Questionnaire     Feeling of Stress : Not at all   Housing Stability: Low Risk  (1/31/2024)    Housing Stability Vital Sign     Unable to Pay for Housing in the Last Year: No     Number of Places Lived in the Last Year: 1     Unstable Housing in the Last Year: No        Allergies:  Review of patient's allergies indicates:  No Known Allergies     Objective:  Vitals:    05/16/24 0902   BP: 129/84   Pulse: (!) 51   Resp: 20   Temp: 97.8 °F (36.6 °C)     General:  Well developed, well nourished adult male in no acute distress  Abdomen: Soft, nontender, no masses  Extremities:  No clubbing, cyanosis, or edema  Neurologic:  Grossly intact  Musculoskeletal:  Normal tone    Assessment:  1. Urinary frequency    2. Indwelling Velázquez catheter present     Plan:  1 and 2.  We will have him stop the oxybutynin as this may be contributing to incomplete emptying of the bladder.  Increase tamsulosin to twice a day.  Plan for a voiding trial on Monday.    Follow-up:  Return in four days for voiding trial.

## 2024-05-18 ENCOUNTER — HOSPITAL ENCOUNTER (EMERGENCY)
Facility: HOSPITAL | Age: 59
Discharge: HOME | End: 2024-05-18
Attending: FAMILY MEDICINE
Payer: MEDICARE

## 2024-05-18 ENCOUNTER — HOSPITAL ENCOUNTER (EMERGENCY)
Facility: HOSPITAL | Age: 59
Discharge: HOME | End: 2024-05-18
Attending: INTERNAL MEDICINE
Payer: MEDICARE

## 2024-05-18 VITALS
HEART RATE: 60 BPM | OXYGEN SATURATION: 100 % | RESPIRATION RATE: 18 BRPM | BODY MASS INDEX: 28.34 KG/M2 | SYSTOLIC BLOOD PRESSURE: 122 MMHG | WEIGHT: 176.38 LBS | DIASTOLIC BLOOD PRESSURE: 74 MMHG | HEIGHT: 66 IN | TEMPERATURE: 98 F

## 2024-05-18 VITALS
TEMPERATURE: 98 F | OXYGEN SATURATION: 98 % | HEIGHT: 65 IN | DIASTOLIC BLOOD PRESSURE: 87 MMHG | SYSTOLIC BLOOD PRESSURE: 122 MMHG | WEIGHT: 183 LBS | HEART RATE: 88 BPM | RESPIRATION RATE: 18 BRPM | BODY MASS INDEX: 30.49 KG/M2

## 2024-05-18 DIAGNOSIS — N40.1 URINARY RETENTION DUE TO BENIGN PROSTATIC HYPERPLASIA: Primary | ICD-10-CM

## 2024-05-18 DIAGNOSIS — T83.9XXA FOLEY CATHETER PROBLEM, INITIAL ENCOUNTER: Primary | ICD-10-CM

## 2024-05-18 DIAGNOSIS — R33.8 URINARY RETENTION DUE TO BENIGN PROSTATIC HYPERPLASIA: Primary | ICD-10-CM

## 2024-05-18 DIAGNOSIS — R31.0 HEMATURIA, GROSS: ICD-10-CM

## 2024-05-18 DIAGNOSIS — N39.0 COMPLICATED UTI (URINARY TRACT INFECTION): ICD-10-CM

## 2024-05-18 LAB
ALBUMIN SERPL-MCNC: 3.7 G/DL (ref 3.4–4.8)
ALBUMIN/GLOB SERPL: 1 RATIO (ref 1.1–2)
ALP SERPL-CCNC: 86 UNIT/L (ref 40–150)
ALT SERPL-CCNC: 44 UNIT/L (ref 0–55)
ANION GAP SERPL CALC-SCNC: 8 MEQ/L
AST SERPL-CCNC: 17 UNIT/L (ref 5–34)
BACTERIA #/AREA URNS AUTO: ABNORMAL /HPF
BASOPHILS # BLD AUTO: 0.02 X10(3)/MCL
BASOPHILS NFR BLD AUTO: 0.3 %
BILIRUB SERPL-MCNC: 0.3 MG/DL
BILIRUB UR QL STRIP.AUTO: NEGATIVE
BUN SERPL-MCNC: 11.1 MG/DL (ref 8.4–25.7)
CALCIUM SERPL-MCNC: 9.3 MG/DL (ref 8.8–10)
CHLORIDE SERPL-SCNC: 110 MMOL/L (ref 98–107)
CLARITY UR: ABNORMAL
CO2 SERPL-SCNC: 25 MMOL/L (ref 23–31)
COLOR UR AUTO: ABNORMAL
CREAT SERPL-MCNC: 0.79 MG/DL (ref 0.73–1.18)
CREAT/UREA NIT SERPL: 14
EOSINOPHIL # BLD AUTO: 0.08 X10(3)/MCL (ref 0–0.9)
EOSINOPHIL NFR BLD AUTO: 1.3 %
ERYTHROCYTE [DISTWIDTH] IN BLOOD BY AUTOMATED COUNT: 15.5 % (ref 11.5–17)
GFR SERPLBLD CREATININE-BSD FMLA CKD-EPI: >60 ML/MIN/1.73/M2
GLOBULIN SER-MCNC: 3.7 GM/DL (ref 2.4–3.5)
GLUCOSE SERPL-MCNC: 101 MG/DL (ref 82–115)
GLUCOSE UR QL STRIP: NORMAL
HCT VFR BLD AUTO: 39.9 % (ref 42–52)
HGB BLD-MCNC: 12.2 G/DL (ref 14–18)
HGB UR QL STRIP: ABNORMAL
HOLD SPECIMEN: NORMAL
HYALINE CASTS #/AREA URNS LPF: ABNORMAL /LPF
IMM GRANULOCYTES # BLD AUTO: 0.01 X10(3)/MCL (ref 0–0.04)
IMM GRANULOCYTES NFR BLD AUTO: 0.2 %
KETONES UR QL STRIP: ABNORMAL
LEUKOCYTE ESTERASE UR QL STRIP: 250
LYMPHOCYTES # BLD AUTO: 2.32 X10(3)/MCL (ref 0.6–4.6)
LYMPHOCYTES NFR BLD AUTO: 37.6 %
MCH RBC QN AUTO: 23.2 PG (ref 27–31)
MCHC RBC AUTO-ENTMCNC: 30.6 G/DL (ref 33–36)
MCV RBC AUTO: 76 FL (ref 80–94)
MONOCYTES # BLD AUTO: 0.4 X10(3)/MCL (ref 0.1–1.3)
MONOCYTES NFR BLD AUTO: 6.5 %
NEUTROPHILS # BLD AUTO: 3.34 X10(3)/MCL (ref 2.1–9.2)
NEUTROPHILS NFR BLD AUTO: 54.1 %
NITRITE UR QL STRIP: NEGATIVE
NRBC BLD AUTO-RTO: 0 %
PH UR STRIP: 5.5 [PH]
PLATELET # BLD AUTO: 233 X10(3)/MCL (ref 130–400)
PMV BLD AUTO: 10 FL (ref 7.4–10.4)
POTASSIUM SERPL-SCNC: 4 MMOL/L (ref 3.5–5.1)
PROT SERPL-MCNC: 7.4 GM/DL (ref 5.8–7.6)
PROT UR QL STRIP: ABNORMAL
RBC # BLD AUTO: 5.25 X10(6)/MCL (ref 4.7–6.1)
RBC #/AREA URNS AUTO: >100 /HPF
SODIUM SERPL-SCNC: 143 MMOL/L (ref 136–145)
SP GR UR STRIP.AUTO: 1.02 (ref 1–1.03)
SQUAMOUS #/AREA URNS LPF: ABNORMAL /HPF
UROBILINOGEN UR STRIP-ACNC: NORMAL
WBC # SPEC AUTO: 6.17 X10(3)/MCL (ref 4.5–11.5)
WBC #/AREA URNS AUTO: ABNORMAL /HPF

## 2024-05-18 PROCEDURE — 81001 URINALYSIS AUTO W/SCOPE: CPT | Performed by: FAMILY MEDICINE

## 2024-05-18 PROCEDURE — 96360 HYDRATION IV INFUSION INIT: CPT

## 2024-05-18 PROCEDURE — 87086 URINE CULTURE/COLONY COUNT: CPT | Performed by: FAMILY MEDICINE

## 2024-05-18 PROCEDURE — 25000003 PHARM REV CODE 250: Performed by: INTERNAL MEDICINE

## 2024-05-18 PROCEDURE — 85025 COMPLETE CBC W/AUTO DIFF WBC: CPT | Performed by: INTERNAL MEDICINE

## 2024-05-18 PROCEDURE — 99283 EMERGENCY DEPT VISIT LOW MDM: CPT | Mod: 25,27

## 2024-05-18 PROCEDURE — 80053 COMPREHEN METABOLIC PANEL: CPT | Performed by: INTERNAL MEDICINE

## 2024-05-18 PROCEDURE — 25000003 PHARM REV CODE 250: Performed by: FAMILY MEDICINE

## 2024-05-18 PROCEDURE — 51702 INSERT TEMP BLADDER CATH: CPT

## 2024-05-18 PROCEDURE — 99284 EMERGENCY DEPT VISIT MOD MDM: CPT | Mod: 25

## 2024-05-18 RX ORDER — CIPROFLOXACIN 500 MG/1
500 TABLET ORAL 2 TIMES DAILY
Qty: 20 TABLET | Refills: 0 | Status: SHIPPED | OUTPATIENT
Start: 2024-05-18 | End: 2024-05-28

## 2024-05-18 RX ORDER — LIDOCAINE HYDROCHLORIDE 20 MG/ML
JELLY TOPICAL
Status: DISCONTINUED | OUTPATIENT
Start: 2024-05-18 | End: 2024-05-18 | Stop reason: HOSPADM

## 2024-05-18 RX ORDER — HYDROCODONE BITARTRATE AND ACETAMINOPHEN 7.5; 325 MG/1; MG/1
1 TABLET ORAL
Status: COMPLETED | OUTPATIENT
Start: 2024-05-18 | End: 2024-05-18

## 2024-05-18 RX ORDER — LIDOCAINE HYDROCHLORIDE 20 MG/ML
JELLY TOPICAL
Status: COMPLETED | OUTPATIENT
Start: 2024-05-18 | End: 2024-05-18

## 2024-05-18 RX ORDER — HYDROCODONE BITARTRATE AND ACETAMINOPHEN 7.5; 325 MG/1; MG/1
1 TABLET ORAL ONCE
Status: COMPLETED | OUTPATIENT
Start: 2024-05-18 | End: 2024-05-18

## 2024-05-18 RX ADMIN — SODIUM CHLORIDE 1000 ML: 9 INJECTION, SOLUTION INTRAVENOUS at 10:05

## 2024-05-18 RX ADMIN — LIDOCAINE HYDROCHLORIDE 11 ML: 20 JELLY TOPICAL at 05:05

## 2024-05-18 RX ADMIN — HYDROCODONE BITARTRATE AND ACETAMINOPHEN 1 TABLET: 7.5; 325 TABLET ORAL at 05:05

## 2024-05-18 RX ADMIN — HYDROCODONE BITARTRATE AND ACETAMINOPHEN 1 TABLET: 7.5; 325 TABLET ORAL at 10:05

## 2024-05-18 NOTE — ED PROVIDER NOTES
"Encounter Date: 5/18/2024       History     Chief Complaint   Patient presents with    Catheter Issue     Pt. States he has been urinating around catheter x2 days. Initially placed for urinary retention d/t BPH     58-year-old gentleman presents emergency room with complaints of Velázquez catheter problems.  Patient reports that he has been urinating around the Velázquez catheter for the last 2 days, having increasing discomfort.  Patient denies fever chills.  Denies nausea or vomiting.  History of urinary retention.    The history is provided by the patient.     Review of patient's allergies indicates:  No Known Allergies  Past Medical History:   Diagnosis Date    BPH (benign prostatic hyperplasia)     No known health problems      Past Surgical History:   Procedure Laterality Date    COLONOSCOPY  05/28/2014    Gonsalo Gonzalez MD    INSERTION OF SHUNT      "shunt in head as a baby"     Family History   Problem Relation Name Age of Onset    Diabetes Mother Maria C garcia     Heart disease Mother Maria C garcia     Hypertension Mother Maria C garcia     Diabetes Father Jude garcia sr      Social History     Tobacco Use    Smoking status: Never     Passive exposure: Never    Smokeless tobacco: Never   Substance Use Topics    Alcohol use: Never    Drug use: Never     Review of Systems   Constitutional:  Negative for chills, fatigue and fever.   HENT:  Negative for ear pain, rhinorrhea and sore throat.    Eyes:  Negative for photophobia and pain.   Respiratory:  Negative for cough, shortness of breath and wheezing.    Cardiovascular:  Negative for chest pain.   Gastrointestinal:  Negative for abdominal pain, diarrhea, nausea and vomiting.   Genitourinary:  Negative for dysuria.   Neurological:  Negative for dizziness, weakness and headaches.   All other systems reviewed and are negative.      Physical Exam     Initial Vitals [05/18/24 0524]   BP Pulse Resp Temp SpO2   120/69 84 17 97.7 °F (36.5 °C) 98 %      MAP       --  "        Physical Exam    Nursing note and vitals reviewed.  Constitutional: He appears well-developed and well-nourished.   HENT:   Head: Normocephalic and atraumatic.   Eyes: EOM are normal. Pupils are equal, round, and reactive to light.   Neck: Neck supple.   Normal range of motion.  Cardiovascular:  Normal rate, regular rhythm and normal heart sounds.     Exam reveals no gallop and no friction rub.       No murmur heard.  Pulmonary/Chest: Breath sounds normal. No respiratory distress.   Abdominal: Abdomen is soft. Bowel sounds are normal. He exhibits no distension. There is no abdominal tenderness.   Musculoskeletal:         General: Normal range of motion.      Cervical back: Normal range of motion and neck supple.     Neurological: He is alert and oriented to person, place, and time. He has normal strength.   Skin: Skin is warm and dry. Capillary refill takes less than 2 seconds.   Psychiatric: He has a normal mood and affect. His behavior is normal. Judgment and thought content normal.         ED Course   Procedures  Labs Reviewed   URINALYSIS, REFLEX TO URINE CULTURE - Abnormal; Notable for the following components:       Result Value    Color, UA Pink-red (*)     Appearance, UA Cloudy (*)     Protein, UA 1+ (*)     Ketones, UA Trace (*)     Blood, UA 3+ (*)     Leukocyte Esterase,  (*)     WBC, UA 11-20 (*)     RBC, UA >100 (*)     All other components within normal limits   CULTURE, URINE          Imaging Results    None          Medications   LIDOcaine HCl 2% urojet (11 mLs Mucous Membrane Given 5/18/24 9450)   HYDROcodone-acetaminophen 7.5-325 mg per tablet 1 tablet (1 tablet Oral Given 5/18/24 2331)     Medical Decision Making  58-year-old gentleman presents emergency room complaints of urinary retention.  Recently had Velázquez catheter placed due to acute urinary retention with greater than 300 mL within the bladder, and also has a urology appointment 2 days prior.  Patient had leaking around the  catheter, and went back to urology clinic where he was reassessed per patient and discharged with the same catheter.  Patient reports since that time, that he was had diminished draining into the Youssef catheter bag, and his pants stay wet constantly due to leaking around the catheter site.  Bedside ultrasound performed which shows that patient's bladder this fairly decompressed without having acute urinary retention at present.  Discussed with the patient regarding flushing versus changing, and will proceed with changing Youssef catheter.    Will also obtain a urinalysis to ensure no underlying infection.  Urine will be obtained from the newly placed Youssef catheter.    Differential diagnosis:  Acute urinary retention, acute cystitis, Youssef catheter problem    Amount and/or Complexity of Data Reviewed  External Data Reviewed: notes.     Details: -Reviewed ED visit from Wednesday 05/15/24 (3 days prior).  Urinary retention, youssef catheter placed.  -Reviewed Urology visit from Thursday 05/16/24 (2 days prior).  Plan: Stop the oxybutynin as this may be contributing to incomplete emptying of the bladder.  Increase tamsulosin to twice a day.  Plan for a voiding trial on Monday. (Schedule to have voiding trial in 2 days).      Risk  Prescription drug management.               ED Course as of 05/18/24 0736   Sat May 18, 2024   0653 Called lab to check on status of UA - will be about 10 minutes.  Will transition patient to Dr. Eldridge.  [MW]      ED Course User Index  [MW] Sharif Amaya MD                           Clinical Impression:  Final diagnoses:  [T83.9XXA] Youssef catheter problem, initial encounter (Primary)  [N39.0] Complicated UTI (urinary tract infection)          ED Disposition Condition    Discharge Stable          ED Prescriptions       Medication Sig Dispense Start Date End Date Auth. Provider    ciprofloxacin HCl (CIPRO) 500 MG tablet Take 1 tablet (500 mg total) by mouth 2 (two) times daily. for 10  days 20 tablet 5/18/2024 5/28/2024 Xavi Dillard MD          Follow-up Information       Follow up With Specialties Details Why Contact Info    [facility] Emergency Medicine  As needed, If symptoms worsen [address] [facility] [id number] [phone]    [facility] Urology Schedule an appointment as soon as possible for a visit in 2 weeks  [address] [facility] [id number] [phone]    Pretty Flores, [facility] Family Medicine Schedule an appointment as soon as possible for a visit in 2 weeks  [address] [facility] [address]  [phone]               Xavi Dillard MD  05/18/24 0736

## 2024-05-18 NOTE — ED PROVIDER NOTES
"Encounter Date: 5/18/2024       History     Chief Complaint   Patient presents with    Hematuria     Pt reports urine leaks around the catheter and some esequiel red blood reported.      Presents with hematuria and pain after indwelling youssef change today, around 2 hrs ago. Hx of BPH with urinary retention, been F/U at Urology Clinic    The history is provided by the patient.     Review of patient's allergies indicates:  No Known Allergies  Past Medical History:   Diagnosis Date    BPH (benign prostatic hyperplasia)     No known health problems      Past Surgical History:   Procedure Laterality Date    COLONOSCOPY  05/28/2014    Gonsalo Gonzalez MD    INSERTION OF SHUNT      "shunt in head as a baby"     Family History   Problem Relation Name Age of Onset    Diabetes Mother Maria C garcia     Heart disease Mother Maria C garcia     Hypertension Mother Maria C garcia     Diabetes Father Jude garcia sr      Social History     Tobacco Use    Smoking status: Never     Passive exposure: Never    Smokeless tobacco: Never   Substance Use Topics    Alcohol use: Never    Drug use: Never     Review of Systems   Genitourinary:  Positive for difficulty urinating and hematuria.       Physical Exam     Initial Vitals [05/18/24 0848]   BP Pulse Resp Temp SpO2   122/74 60 18 98.2 °F (36.8 °C) 100 %      MAP       --         Physical Exam    Nursing note and vitals reviewed.  Constitutional: He appears well-developed. He appears distressed (MIld, pain).   HENT:   Head: Normocephalic and atraumatic.   Mouth/Throat: Oropharynx is clear and moist.   Eyes: Conjunctivae are normal. Pupils are equal, round, and reactive to light.   Neck: Neck supple. No JVD present.   Normal range of motion.  Cardiovascular:  Regular rhythm, normal heart sounds and intact distal pulses.           Pulmonary/Chest: Breath sounds normal. No respiratory distress.   Abdominal: Abdomen is soft. Bowel sounds are normal. He exhibits no distension. There is no " abdominal tenderness. There is no rebound and no guarding.   Genitourinary:    Genitourinary Comments: Indwelling youssef in place with hematuria and blood leaking     Musculoskeletal:         General: No edema. Normal range of motion.      Cervical back: Normal range of motion and neck supple.     Neurological: He is alert and oriented to person, place, and time. He has normal strength. GCS score is 15. GCS eye subscore is 4. GCS verbal subscore is 5. GCS motor subscore is 6.   Skin: Skin is warm and dry. No rash noted.   Psychiatric: His behavior is normal.         ED Course   Procedures  Labs Reviewed   COMPREHENSIVE METABOLIC PANEL - Abnormal; Notable for the following components:       Result Value    Chloride 110 (*)     Globulin 3.7 (*)     Albumin/Globulin Ratio 1.0 (*)     All other components within normal limits   CBC WITH DIFFERENTIAL - Abnormal; Notable for the following components:    Hgb 12.2 (*)     Hct 39.9 (*)     MCV 76.0 (*)     MCH 23.2 (*)     MCHC 30.6 (*)     All other components within normal limits   CBC W/ AUTO DIFFERENTIAL    Narrative:     The following orders were created for panel order CBC auto differential.  Procedure                               Abnormality         Status                     ---------                               -----------         ------                     CBC with Differential[[id number]]       Abnormal            Final result                 Please view results for these tests on the individual orders.   EXTRA TUBES    Narrative:     The following orders were created for panel order EXTRA TUBES.  Procedure                               Abnormality         Status                     ---------                               -----------         ------                     Light Blue Top Hold[[id number]]                             Final result               Red Top Hold[[id number]]                                    Final result               Pink Top  Hold[[id number]]                                   Final result                 Please view results for these tests on the individual orders.   LIGHT BLUE TOP HOLD   RED TOP HOLD   PINK TOP HOLD          Imaging Results    None          Medications   LIDOcaine HCl 2% urojet (has no administration in time range)   sodium chloride 0.9% bolus 1,000 mL 1,000 mL (1,000 mLs Intravenous New Bag 5/18/24 1053)   HYDROcodone-acetaminophen 7.5-325 mg per tablet 1 tablet (1 tablet Oral Given 5/18/24 1019)     Medical Decision Making  Amount and/or Complexity of Data Reviewed  External Data Reviewed: labs.     Details: 05/18/24 06:33  Color, UA: Pink-red !  Appearance, UA: Cloudy !  Specific Gravity,UA: 1.020  pH, UA: 5.5  Protein, UA: 1+ !  Glucose, UA: Normal  Ketones, UA: Trace !  Blood, UA: 3+ !  NITRITE UA: Negative  Bilirubin, UA: Negative  Urobilinogen, UA: Normal  Leukocyte Esterase, UA: 250 !  RBC, UA: >100 !  WBC, UA: 11-20 !  Bacteria, UA: Rare  Squamous Epithelial Cells, UA: Trace  Hyaline Casts, UA: None Seen      !: Data is abnormal  Labs: ordered. Decision-making details documented in ED Course.    Risk  Prescription drug management.      Additional MDM:   Differential Diagnosis:   Other: The following diagnoses were also considered and will be evaluated: Instrumentation urethral injury, Hemorrhagic cystitis and hemorrhagic prostatitis.                        11:40 AM    After youssef change for a code #18 Fr, urine clearing up, pain improved, bladder scanner w/o retention. Will D/H           Clinical Impression:  Final diagnoses:  [N40.1, R33.8] Urinary retention due to benign prostatic hyperplasia (Primary)  [R31.0] Hematuria, gross          ED Disposition Condition    Discharge Stable          ED Prescriptions    None       Follow-up Information       Follow up With Specialties Details Why Contact Info    Pretty Flores FNP Family Medicine Schedule an appointment as soon as possible for a visit in 2 weeks   [address] [facility] [address]  [phone]      [facility] Emergency Medicine  If symptoms worsen [address] [facility] [id number] [phone]    [facility] Urology Schedule an appointment as soon as possible for a visit in 1 week  [address] [facility] [id number] [phone]             Xavi Dillard MD  05/18/24 1142

## 2024-05-18 NOTE — ED NOTES
Pt and family given discharge instructions. Instructed to follow up with pcp and return to er if any complications. Pt given youssef cath instructions. Pt stated he has a follow up on Monday with urology pt instructed to keep follow up.

## 2024-05-20 ENCOUNTER — CLINICAL SUPPORT (OUTPATIENT)
Dept: UROLOGY | Facility: CLINIC | Age: 59
End: 2024-05-20
Payer: MEDICARE

## 2024-05-20 DIAGNOSIS — R33.9 URINARY RETENTION: Primary | ICD-10-CM

## 2024-05-20 PROCEDURE — 99212 OFFICE O/P EST SF 10 MIN: CPT | Mod: PBBFAC

## 2024-05-20 PROCEDURE — 51700 IRRIGATION OF BLADDER: CPT | Mod: PBBFAC | Performed by: UROLOGY

## 2024-05-20 PROCEDURE — 51700 IRRIGATION OF BLADDER: CPT | Mod: S$PBB,,, | Performed by: UROLOGY

## 2024-05-20 NOTE — PROGRESS NOTES
Bladder trial performed in clinic today.  150 mL instilled into bladder and pt voided 150 mL at the end of trial. Provider notified of results.

## 2024-05-23 LAB
BACTERIA UR CULT: ABNORMAL
BACTERIA UR CULT: ABNORMAL

## 2024-06-05 ENCOUNTER — OFFICE VISIT (OUTPATIENT)
Dept: UROLOGY | Facility: CLINIC | Age: 59
End: 2024-06-05
Payer: MEDICARE

## 2024-06-05 VITALS
RESPIRATION RATE: 20 BRPM | OXYGEN SATURATION: 98 % | HEART RATE: 59 BPM | WEIGHT: 185 LBS | HEIGHT: 66 IN | SYSTOLIC BLOOD PRESSURE: 105 MMHG | BODY MASS INDEX: 29.73 KG/M2 | TEMPERATURE: 98 F | DIASTOLIC BLOOD PRESSURE: 72 MMHG

## 2024-06-05 DIAGNOSIS — N40.1 BPH WITH OBSTRUCTION/LOWER URINARY TRACT SYMPTOMS: ICD-10-CM

## 2024-06-05 DIAGNOSIS — N13.8 BPH WITH OBSTRUCTION/LOWER URINARY TRACT SYMPTOMS: ICD-10-CM

## 2024-06-05 DIAGNOSIS — R39.15 URINARY URGENCY: ICD-10-CM

## 2024-06-05 DIAGNOSIS — Z87.898 HISTORY OF URINARY RETENTION: Primary | ICD-10-CM

## 2024-06-05 LAB
BILIRUB SERPL-MCNC: NORMAL MG/DL
BLOOD URINE, POC: NORMAL
COLOR, POC UA: YELLOW
GLUCOSE UR QL STRIP: NORMAL
KETONES UR QL STRIP: NORMAL
LEUKOCYTE ESTERASE URINE, POC: NORMAL
NITRITE, POC UA: NORMAL
PH, POC UA: 6
POC RESIDUAL URINE VOLUME: 127 ML (ref 0–100)
PROTEIN, POC: NORMAL
SPECIFIC GRAVITY, POC UA: 1.02
UROBILINOGEN, POC UA: 0.2

## 2024-06-05 PROCEDURE — 99214 OFFICE O/P EST MOD 30 MIN: CPT | Mod: S$PBB,,, | Performed by: UROLOGY

## 2024-06-05 PROCEDURE — 51798 US URINE CAPACITY MEASURE: CPT | Mod: PBBFAC | Performed by: UROLOGY

## 2024-06-05 PROCEDURE — 99214 OFFICE O/P EST MOD 30 MIN: CPT | Mod: PBBFAC | Performed by: UROLOGY

## 2024-06-05 PROCEDURE — 81001 URINALYSIS AUTO W/SCOPE: CPT | Mod: PBBFAC | Performed by: UROLOGY

## 2024-06-05 RX ORDER — TAMSULOSIN HYDROCHLORIDE 0.4 MG/1
0.8 CAPSULE ORAL DAILY
Qty: 90 CAPSULE | Refills: 3 | Status: SHIPPED | OUTPATIENT
Start: 2024-06-05

## 2024-06-05 NOTE — PROGRESS NOTES
CC:  Follow-up urinary retention    HPI:  Jude Kaur Jr. is a 58 y.o. male seen for follow-up of urinary retention.  He began having problems with constant urinary frequency. He went to the emergency room on 15 May 2024 and they put an indwelling Velázquez catheter in. Bladder scan showed 300 cc. I increased the tamsulosin to twice a day but he stopped taking the evening dose because he felt like it caused urinary frequency.  He passed a voiding trial on 20 May 2023.    He was on Oxybutynin but stopped that after the episode of retention and is not taking it currently.      Bladder scan residual:  127 ml    Urinalysis:  Results for orders placed or performed in visit on 06/05/24   POCT URINE DIPSTICK WITH MICROSCOPE, AUTOMATED   Result Value Ref Range    Color, UA Yellow     Spec Grav UA 1.020     pH, UA 6.0     WBC, UA neg     Nitrite, UA neg     Protein, POC neg     Glucose, UA neg     Ketones, UA neg     Urobilinogen, UA 0.2     Bilirubin, POC neg     Blood, UA trace-intact      Microscopic Urinalysis:  WBC:   None per HPF     RBC:    0-1 per HPF     Bacteria:    None per HPF     Squamous epithelial cells:    None per HPF      Crystals:   None    Lab Results:  PSA History:    05/18/17 10:50 06/18/18 09:25 07/17/19 10:05 07/15/20 09:39 04/19/21 08:28 10/17/22 09:36 11/02/23 15:41   1.5 1.8 2.3 2.9 2.54 3.03 3.16       Recent Labs     05/18/24  0908   CREATININE 0.79   ROS:  All systems reviewed and are negative except as documented in HPI and/or Assessment and Plan.     Patient Active Problem List:     Patient Active Problem List   Diagnosis    Anemia    Controlled type 2 diabetes mellitus without complication, without long-term current use of insulin    Hematuria    History of fall    Hydrocephalus    Loss of balance    Obesity    Pain in head     (ventriculoperitoneal) shunt status    Prostate cancer screening    COVID    Acute left-sided low back pain with left-sided sciatica        Past Medical  "History:  Past Medical History:   Diagnosis Date    BPH (benign prostatic hyperplasia)     No known health problems         Past Surgical History:  Past Surgical History:   Procedure Laterality Date    COLONOSCOPY  05/28/2014    Gonsalo Gonzalez MD    INSERTION OF SHUNT      "shunt in head as a baby"        Family History:  Family History   Problem Relation Name Age of Onset    Diabetes Mother Maria C garcia     Heart disease Mother MariaC garcia     Hypertension Mother Maria C garcia     Diabetes Father Jude garcia sr         Social History:  Social History     Socioeconomic History    Marital status:    Tobacco Use    Smoking status: Never     Passive exposure: Never    Smokeless tobacco: Never   Substance and Sexual Activity    Alcohol use: Never    Drug use: Never    Sexual activity: Yes     Partners: Female     Birth control/protection: None     Social Determinants of Health     Financial Resource Strain: Low Risk  (1/31/2024)    Overall Financial Resource Strain (CARDIA)     Difficulty of Paying Living Expenses: Not very hard   Food Insecurity: No Food Insecurity (1/31/2024)    Hunger Vital Sign     Worried About Running Out of Food in the Last Year: Never true     Ran Out of Food in the Last Year: Never true   Transportation Needs: No Transportation Needs (1/31/2024)    PRAPARE - Transportation     Lack of Transportation (Medical): No     Lack of Transportation (Non-Medical): No   Physical Activity: Inactive (1/31/2024)    Exercise Vital Sign     Days of Exercise per Week: 2 days     Minutes of Exercise per Session: 0 min   Stress: No Stress Concern Present (1/31/2024)    [employer] - Occupational Stress Questionnaire     Feeling of Stress : Not at all   Housing Stability: Low Risk  (1/31/2024)    Housing Stability Vital Sign     Unable to Pay for Housing in the Last Year: No     Number of Places Lived in the Last Year: 1     Unstable Housing in the Last Year: No    "     Allergies:  Review of patient's allergies indicates:  No Known Allergies     Objective:  Vitals:    06/05/24 0936   BP: 105/72   Pulse:    Resp:    Temp:      General:  Well developed, well nourished adult male in no acute distress  Abdomen: Soft, nontender, no masses  Extremities:  No clubbing, cyanosis, or edema  Neurologic:  Grossly intact  Musculoskeletal:  Normal tone     Assessment:  1. BPH with obstruction/lower urinary tract symptoms  - tamsulosin (FLOMAX) 0.4 mg Cap; Take 2 capsules (0.8 mg total) by mouth once daily.  Dispense: 90 capsule; Refill: 3    2. History of urinary retention  - POCT URINE DIPSTICK WITH MICROSCOPE, AUTOMATED  - POCT Bladder Scan    3. Urinary urgency     Plan:  Go back to tamsulosin 0.8 mg but take two in the morning.    Watch the emptying closely to prevent urinary retention in the future.    Will leave him off OAB medications for now.  If this continues to be a problem may try Myrbetriq.     Follow-up:  Three months for bladder scan.

## 2024-06-06 ENCOUNTER — PATIENT OUTREACH (OUTPATIENT)
Facility: CLINIC | Age: 59
End: 2024-06-06
Payer: MEDICARE

## 2024-06-06 DIAGNOSIS — E11.9 CONTROLLED TYPE 2 DIABETES MELLITUS WITHOUT COMPLICATION, WITHOUT LONG-TERM CURRENT USE OF INSULIN: Primary | ICD-10-CM

## 2024-06-06 NOTE — PROGRESS NOTES
Health Maintenance Topic(s) Outreach Outcomes & Actions Taken:    Lab(s) - Outreach Outcomes & Actions Taken  : Overdue Lab(s) Ordered and WOG for lipid to be completed with A1c and other due labs prior to next visit on 8/7/2024.     Medication Adherence / Statins - Outreach Outcomes & Actions Taken  : Diabetes pt. Declines statin per clinic visit note.      Additional Notes:  Population health outreach for health maintenance.   Attempt made to contact patient. Call cannot be completed at this time. Portal message sent to pt.      Last cscope at [facility] gastro 5/28/2014. Now due. Would like to know if pt wants referral to same?                pt recently on comfort measures

## 2024-07-20 ENCOUNTER — OFFICE VISIT (OUTPATIENT)
Dept: URGENT CARE | Facility: CLINIC | Age: 59
End: 2024-07-20
Payer: MEDICARE

## 2024-07-20 VITALS
BODY MASS INDEX: 29.99 KG/M2 | WEIGHT: 180 LBS | SYSTOLIC BLOOD PRESSURE: 115 MMHG | HEART RATE: 58 BPM | DIASTOLIC BLOOD PRESSURE: 73 MMHG | RESPIRATION RATE: 18 BRPM | HEIGHT: 65 IN | TEMPERATURE: 99 F | OXYGEN SATURATION: 98 %

## 2024-07-20 DIAGNOSIS — M54.42 ACUTE LEFT-SIDED LOW BACK PAIN WITH LEFT-SIDED SCIATICA: Primary | ICD-10-CM

## 2024-07-20 PROCEDURE — 96372 THER/PROPH/DIAG INJ SC/IM: CPT | Mod: ,,, | Performed by: NURSE PRACTITIONER

## 2024-07-20 PROCEDURE — 99213 OFFICE O/P EST LOW 20 MIN: CPT | Mod: 25,,, | Performed by: NURSE PRACTITIONER

## 2024-07-20 RX ORDER — CYCLOBENZAPRINE HCL 10 MG
10 TABLET ORAL 3 TIMES DAILY PRN
Qty: 30 TABLET | Refills: 0 | Status: SHIPPED | OUTPATIENT
Start: 2024-07-20 | End: 2024-07-30

## 2024-07-20 RX ORDER — BETAMETHASONE SODIUM PHOSPHATE AND BETAMETHASONE ACETATE 3; 3 MG/ML; MG/ML
6 INJECTION, SUSPENSION INTRA-ARTICULAR; INTRALESIONAL; INTRAMUSCULAR; SOFT TISSUE
Status: COMPLETED | OUTPATIENT
Start: 2024-07-20 | End: 2024-07-20

## 2024-07-20 RX ORDER — KETOROLAC TROMETHAMINE 30 MG/ML
30 INJECTION, SOLUTION INTRAMUSCULAR; INTRAVENOUS
Status: COMPLETED | OUTPATIENT
Start: 2024-07-20 | End: 2024-07-20

## 2024-07-20 RX ADMIN — BETAMETHASONE SODIUM PHOSPHATE AND BETAMETHASONE ACETATE 6 MG: 3; 3 INJECTION, SUSPENSION INTRA-ARTICULAR; INTRALESIONAL; INTRAMUSCULAR; SOFT TISSUE at 10:07

## 2024-07-20 RX ADMIN — KETOROLAC TROMETHAMINE 30 MG: 30 INJECTION, SOLUTION INTRAMUSCULAR; INTRAVENOUS at 10:07

## 2024-07-20 NOTE — PROGRESS NOTES
"Subjective:      Patient ID: Jude Kaur Jr. is a 58 y.o. male.    Vitals:  height is 5' 5" (1.651 m) and weight is 81.6 kg (180 lb). His oral temperature is 98.5 °F (36.9 °C). His blood pressure is 115/73 and his pulse is 58 (abnormal). His respiration is 18 and oxygen saturation is 98%.     Chief Complaint: Back Pain     Patient is a 58 y.o. male who presents to urgent care with complaints of back pain down to left leg  x5  days. Alleviating factors include Advil with mild amount of relief.     Back Pain        Musculoskeletal:  Positive for back pain.      Objective:     Physical Exam   Constitutional: He is oriented to person, place, and time. He appears well-developed. He is cooperative. No distress.   HENT:   Head: Normocephalic and atraumatic.   Nose: Nose normal.   Mouth/Throat: Oropharynx is clear and moist and mucous membranes are normal.   Eyes: Conjunctivae and lids are normal.   Neck: Trachea normal and phonation normal. Neck supple.   Cardiovascular: Normal rate, regular rhythm, normal heart sounds and normal pulses.   Pulmonary/Chest: Effort normal and breath sounds normal.   Abdominal: Normal appearance and bowel sounds are normal. He exhibits no mass. Soft.   Musculoskeletal:         General: No deformity.        Arms:    Neurological: He is alert and oriented to person, place, and time. He has normal strength and normal reflexes. No sensory deficit.   Skin: Skin is warm, dry, intact and not diaphoretic.   Psychiatric: His speech is normal and behavior is normal. Judgment and thought content normal.   Nursing note and vitals reviewed.      Assessment:     1. Acute left-sided low back pain with left-sided sciatica        Plan:   Modify activity and gentle stretching  Take prescription medication as directed.  Flexeril  Tylenol  OTC as directed for pain  Follow-up with your primary care provider if symptoms worsen or persist as instructed     Acute left-sided low back pain with left-sided " sciatica  -     cyclobenzaprine (FLEXERIL) 10 MG tablet; Take 1 tablet (10 mg total) by mouth 3 (three) times daily as needed for Muscle spasms.  Dispense: 30 tablet; Refill: 0  -     ketorolac injection 30 mg  -     betamethasone acetate-betamethasone sodium phosphate injection 6 mg

## 2024-07-20 NOTE — PATIENT INSTRUCTIONS
Modify activity and gentle stretching  Take prescription medication as directed.  Flexeril  Tylenol  OTC as directed for pain  Follow-up with your primary care provider if symptoms worsen or persist as instructed

## 2024-08-27 ENCOUNTER — HOSPITAL ENCOUNTER (EMERGENCY)
Facility: HOSPITAL | Age: 59
Discharge: HOME | End: 2024-08-27
Attending: EMERGENCY MEDICINE
Payer: MEDICARE

## 2024-08-27 VITALS
TEMPERATURE: 98 F | DIASTOLIC BLOOD PRESSURE: 71 MMHG | WEIGHT: 160 LBS | SYSTOLIC BLOOD PRESSURE: 115 MMHG | HEIGHT: 65 IN | HEART RATE: 62 BPM | OXYGEN SATURATION: 99 % | BODY MASS INDEX: 26.66 KG/M2 | RESPIRATION RATE: 18 BRPM

## 2024-08-27 DIAGNOSIS — R05.9 COUGH, UNSPECIFIED TYPE: Primary | ICD-10-CM

## 2024-08-27 LAB
FLUAV AG UPPER RESP QL IA.RAPID: NOT DETECTED
FLUBV AG UPPER RESP QL IA.RAPID: NOT DETECTED
SARS-COV-2 RNA RESP QL NAA+PROBE: NOT DETECTED

## 2024-08-27 PROCEDURE — 0240U COVID/FLU A&B PCR: CPT | Performed by: PHYSICIAN ASSISTANT

## 2024-08-27 PROCEDURE — 99282 EMERGENCY DEPT VISIT SF MDM: CPT

## 2024-08-27 RX ORDER — GUAIFENESIN AND DEXTROMETHORPHAN HYDROBROMIDE 10; 100 MG/5ML; MG/5ML
5 SYRUP ORAL EVERY 6 HOURS
Qty: 118 ML | Refills: 0 | Status: SHIPPED | OUTPATIENT
Start: 2024-08-27 | End: 2024-09-06

## 2024-08-27 NOTE — ED PROVIDER NOTES
"Encounter Date: 8/27/2024       History     Chief Complaint   Patient presents with    COVID-19 Concerns     Pt to ED via POV. Pt reports one episode of coughing this AM. Pt denies congestion and fevers at home.      57 yo male presents to ED for evaluation of cough and congestion since this morning. Denies any fever, sore throat, SOB or CP.     The history is provided by the patient. No  was used.     Review of patient's allergies indicates:  No Known Allergies  Past Medical History:   Diagnosis Date    BPH (benign prostatic hyperplasia)     No known health problems      Past Surgical History:   Procedure Laterality Date    COLONOSCOPY  05/28/2014    Gonsalo Gonzalez MD    INSERTION OF SHUNT      "shunt in head as a baby"     Family History   Problem Relation Name Age of Onset    Diabetes Mother Maria C garcia     Heart disease Mother Maria C garcia     Hypertension Mother Maria C garcia     Diabetes Father Jude garcia sr      Social History     Tobacco Use    Smoking status: Never     Passive exposure: Never    Smokeless tobacco: Never   Substance Use Topics    Alcohol use: Never    Drug use: Never     Review of Systems   Constitutional:  Negative for fatigue and fever.   HENT:  Positive for congestion. Negative for ear pain, rhinorrhea and sore throat.    Respiratory:  Positive for cough. Negative for shortness of breath and wheezing.    Cardiovascular:  Negative for chest pain.   Gastrointestinal:  Negative for abdominal pain, nausea and vomiting.   Musculoskeletal:  Negative for myalgias.   Neurological:  Negative for headaches.   All other systems reviewed and are negative.      Physical Exam     Initial Vitals [08/27/24 1052]   BP Pulse Resp Temp SpO2   115/71 62 18 97.7 °F (36.5 °C) 99 %      MAP       --         Physical Exam    Nursing note and vitals reviewed.  Constitutional: He appears well-developed and well-nourished. He is cooperative.   HENT:   Head: Normocephalic and " atraumatic.   Right Ear: Tympanic membrane and external ear normal.   Left Ear: Tympanic membrane and external ear normal.   Mouth/Throat: Uvula is midline, oropharynx is clear and moist and mucous membranes are normal. No trismus in the jaw. No uvula swelling.   Eyes: Conjunctivae are normal. Pupils are equal, round, and reactive to light.   Neck: Neck supple.   Normal range of motion.  Cardiovascular:  Normal rate, regular rhythm and normal heart sounds.           Pulmonary/Chest: Breath sounds normal. No respiratory distress. He has no wheezes. He has no rhonchi. He has no rales.   Abdominal: Abdomen is soft. Bowel sounds are normal. There is no abdominal tenderness.   Musculoskeletal:         General: Normal range of motion.      Cervical back: Normal range of motion and neck supple.     Neurological: He is alert and oriented to person, place, and time. He has normal strength. No cranial nerve deficit or sensory deficit. GCS score is 15. GCS eye subscore is 4. GCS verbal subscore is 5. GCS motor subscore is 6.   Skin: Skin is warm and dry. Capillary refill takes less than 2 seconds.   Psychiatric: He has a normal mood and affect.         ED Course   Procedures  Labs Reviewed   COVID/FLU A&B PCR - Normal       Result Value    Influenza A PCR Not Detected      Influenza B PCR Not Detected      SARS-CoV-2 PCR Not Detected      Narrative:     The Xpert Xpress SARS-CoV-2/FLU/RSV plus is a rapid, multiplexed real-time PCR test intended for the simultaneous qualitative detection and differentiation of SARS-CoV-2, Influenza A, Influenza B, and respiratory syncytial virus (RSV) viral RNA in either nasopharyngeal swab or nasal swab specimens.                Imaging Results    None          Medications - No data to display  Medical Decision Making  57 yo male presents to ED for evaluation of cough and congestion since this morning. Denies any fever, sore throat, SOB or CP.  Patient requesting COVID testing.    Differential  diagnosis includes but isn't limited to Viral syndrome, COVID, flu, strep, sinusitis, bronchitis      Amount and/or Complexity of Data Reviewed  Labs: ordered. Decision-making details documented in ED Course.  Discussion of management or test interpretation with external provider(s): Patient afebrile and in no acute distress.  SpO2 of 99%.  Patient complains of cough since this morning.  Requesting COVID-19 testing.  COVID and flu negative.  Wife is now reporting that patient has had intermittent urinary frequency.  Discussed with patient getting urine sample and blood work patient states that he does not want to wait for that and will follow up outpatient.  Discussed return ED precautions.  Patient verbalizes understanding.    Risk  OTC drugs.  Prescription drug management.               ED Course as of 08/27/24 1629   Tue Aug 27, 2024   1238 Influenza A, Molecular: Not Detected [SL]   1238 Influenza B, Molecular: Not Detected [SL]   1238 SARS-CoV2 (COVID-19) Qualitative PCR: Not Detected [SL]      ED Course User Index  [SL] Yessenia Chapman PA                           Clinical Impression:  Final diagnoses:  [R05.9] Cough, unspecified type (Primary)          ED Disposition Condition    Discharge Stable          ED Prescriptions       Medication Sig Dispense Start Date End Date Auth. Provider    dextromethorphan-guaiFENesin  mg/5 ml (ROBITUSSIN-DM)  mg/5 mL liquid Take 5 mLs by mouth every 6 (six) hours. for 10 days 118 mL 8/27/2024 9/6/2024 Yessenia Chapman PA          Follow-up Information       Follow up With Specialties Details Why Contact Info    Pretty Flores, [facility]   [address] [facility] [address] [phone]               Yessenia Chapman PA  08/27/24 7259

## 2024-08-27 NOTE — DISCHARGE INSTRUCTIONS
Hydrate with plenty of water. Tylenol and ibuprofen in rotation for fever/aches. Use allergy medication daily. May use cough syrup.

## 2024-09-04 ENCOUNTER — OFFICE VISIT (OUTPATIENT)
Dept: UROLOGY | Facility: CLINIC | Age: 59
End: 2024-09-04
Payer: MEDICARE

## 2024-09-04 VITALS
WEIGHT: 163.81 LBS | TEMPERATURE: 98 F | SYSTOLIC BLOOD PRESSURE: 118 MMHG | HEART RATE: 65 BPM | OXYGEN SATURATION: 98 % | HEIGHT: 65 IN | RESPIRATION RATE: 20 BRPM | DIASTOLIC BLOOD PRESSURE: 72 MMHG | BODY MASS INDEX: 27.29 KG/M2

## 2024-09-04 DIAGNOSIS — Z87.898 HISTORY OF URINARY RETENTION: Primary | ICD-10-CM

## 2024-09-04 DIAGNOSIS — N13.8 BPH WITH OBSTRUCTION/LOWER URINARY TRACT SYMPTOMS: ICD-10-CM

## 2024-09-04 DIAGNOSIS — N40.1 BPH WITH OBSTRUCTION/LOWER URINARY TRACT SYMPTOMS: ICD-10-CM

## 2024-09-04 DIAGNOSIS — R35.0 URINARY FREQUENCY: ICD-10-CM

## 2024-09-04 LAB
BILIRUB SERPL-MCNC: NEGATIVE MG/DL
BLOOD URINE, POC: NORMAL
COLOR, POC UA: YELLOW
GLUCOSE UR QL STRIP: NEGATIVE
KETONES UR QL STRIP: NEGATIVE
LEUKOCYTE ESTERASE URINE, POC: NEGATIVE
NITRITE, POC UA: NEGATIVE
PH, POC UA: 6
POC RESIDUAL URINE VOLUME: 92 ML (ref 0–100)
PROTEIN, POC: NEGATIVE
SPECIFIC GRAVITY, POC UA: 1.02
UROBILINOGEN, POC UA: 0.2

## 2024-09-04 PROCEDURE — 51798 US URINE CAPACITY MEASURE: CPT | Mod: PBBFAC | Performed by: UROLOGY

## 2024-09-04 PROCEDURE — 81001 URINALYSIS AUTO W/SCOPE: CPT | Mod: PBBFAC | Performed by: UROLOGY

## 2024-09-04 PROCEDURE — 99215 OFFICE O/P EST HI 40 MIN: CPT | Mod: PBBFAC | Performed by: UROLOGY

## 2024-09-04 PROCEDURE — 99214 OFFICE O/P EST MOD 30 MIN: CPT | Mod: S$PBB,,, | Performed by: UROLOGY

## 2024-09-04 NOTE — PROGRESS NOTES
Patient seen by Dr. KULDEEP Chin will return in 6 months with PSA. Written and verbal discharge instructions given.

## 2024-09-04 NOTE — PROGRESS NOTES
I saw and evaluated the patient with the resident.  I discussed with the resident and agree with the resident's history, physical, assessment, findings and care plan as documented in the resident's note.        Andreea Villatoro DO  Urology  [facility]

## 2024-09-04 NOTE — PROGRESS NOTES
CC:  Follow up Urinary Retention    HPI:  Jude Kaur Jr. is a 58 y.o. male seen for follow-up of urinary rentention.  He began having problems with constant urinary frequency. He went to the emergency room on 15 May 2024 and they put an indwelling Velázquez catheter in. Bladder scan showed 300 cc. Compliant with Tamsulosin once a day. He passed a voiding trial on 20 May 2023 and has been doing well since that time. He denied any flow concerns at this time.   Admits to increased urinary frequency. He is able to get to the bathroom in time for void; denied any urinary accidents. Previously on Oxybutynin but stopped that after the episode of retention has been taken off. Admits frequency increases after drinking coffee and energy drinks. Denied any burning with void, or increased sensation without flow.     Bladder scan residual:  92ml    Urinalysis:  Results for orders placed or performed in visit on 09/04/24   POCT URINE DIPSTICK WITH MICROSCOPE, AUTOMATED   Result Value Ref Range    Color, UA Yellow     Spec Grav UA 1.025     pH, UA 6.0     WBC, UA Negative     Nitrite, UA Negative     Protein, POC Negative     Glucose, UA Negative     Ketones, UA Negative     Urobilinogen, UA 0.2     Bilirubin, POC Negative     Blood, UA Trace-intact      Microscopic Urinalysis:  WBC:   few per HPF     RBC:    none per HPF     Bacteria:    None per HPF     Squamous epithelial cells:  1-2 per HPF      Crystals:   None    Lab Results:  PSA History:     Latest Reference Range & Units 05/18/17 10:50 06/18/18 09:25 07/17/19 10:05 07/15/20 09:39 04/19/21 08:28 10/17/22 09:36 11/02/23 15:41   Prostate Specific Antigen <=4.00 ng/mL 1.5 1.8 2.3 2.9 2.54 3.03 3.16       ROS:  All systems reviewed and are negative except as documented in HPI and/or Assessment and Plan.     Patient Active Problem List:     Patient Active Problem List   Diagnosis    Anemia    Controlled type 2 diabetes mellitus without complication, without long-term current  "use of insulin    Hematuria    History of fall    Hydrocephalus    Loss of balance    Obesity    Pain in head     (ventriculoperitoneal) shunt status    Prostate cancer screening    COVID    Acute left-sided low back pain with left-sided sciatica        Past Medical History:  Past Medical History:   Diagnosis Date    BPH (benign prostatic hyperplasia)     No known health problems         Past Surgical History:  Past Surgical History:   Procedure Laterality Date    COLONOSCOPY  05/28/2014    Gonsalo Gonzalez MD    INSERTION OF SHUNT      "shunt in head as a baby"        Family History:  Family History   Problem Relation Name Age of Onset    Diabetes Mother Maria C garcia     Heart disease Mother Maria C garcia     Hypertension Mother Maria C garcia     Diabetes Father Jude garcia sr         Social History:  Social History     Socioeconomic History    Marital status:    Tobacco Use    Smoking status: Never     Passive exposure: Never    Smokeless tobacco: Never   Substance and Sexual Activity    Alcohol use: Never    Drug use: Never    Sexual activity: Yes     Partners: Female     Birth control/protection: None     Social Determinants of Health     Financial Resource Strain: Low Risk  (1/31/2024)    Overall Financial Resource Strain (CARDIA)     Difficulty of Paying Living Expenses: Not very hard   Food Insecurity: No Food Insecurity (1/31/2024)    Hunger Vital Sign     Worried About Running Out of Food in the Last Year: Never true     Ran Out of Food in the Last Year: Never true   Transportation Needs: No Transportation Needs (1/31/2024)    PRAPARE - Transportation     Lack of Transportation (Medical): No     Lack of Transportation (Non-Medical): No   Physical Activity: Inactive (1/31/2024)    Exercise Vital Sign     Days of Exercise per Week: 2 days     Minutes of Exercise per Session: 0 min   Stress: No Stress Concern Present (1/31/2024)    [employer] - Occupational " Stress Questionnaire     Feeling of Stress : Not at all   Housing Stability: Low Risk  (1/31/2024)    Housing Stability Vital Sign     Unable to Pay for Housing in the Last Year: No     Number of Places Lived in the Last Year: 1     Unstable Housing in the Last Year: No        Allergies:  Review of patient's allergies indicates:  No Known Allergies     Objective:  Vitals:    09/04/24 0852   BP: 118/72   Pulse: 65   Resp: 20   Temp: 98 °F (36.7 °C)     General:  Well developed, well nourished adult male in no acute distress  Abdomen: Soft, nontender, no masses  Extremities:  No clubbing, cyanosis, or edema  Neurologic:  Grossly intact  Musculoskeletal:  Normal tone    Assessment:  1. History of urinary retention  - POCT URINE DIPSTICK WITH MICROSCOPE, AUTOMATED  - POCT Bladder Scan    2. BPH with obstruction/lower urinary tract symptoms  - PSA, Total (Diagnostic); Future    3. Urinary frequency     Plan:  -Discussed behavior modifications given increased urinary frequency after caffeine use. Also discussed stopping fluid intake a couple hours prior to bed time.   -Due for PSA - ordered  -Urine retention doing well at this time; continue with Flomax daily. Denied need for refills.     Follow-up:  Six month follow up with PSA.

## 2024-09-27 ENCOUNTER — OFFICE VISIT (OUTPATIENT)
Dept: INTERNAL MEDICINE | Facility: CLINIC | Age: 59
End: 2024-09-27
Payer: MEDICARE

## 2024-09-27 VITALS
TEMPERATURE: 98 F | BODY MASS INDEX: 29.99 KG/M2 | WEIGHT: 180 LBS | HEIGHT: 65 IN | SYSTOLIC BLOOD PRESSURE: 115 MMHG | HEART RATE: 60 BPM | DIASTOLIC BLOOD PRESSURE: 76 MMHG

## 2024-09-27 DIAGNOSIS — Z12.5 PROSTATE CANCER SCREENING: ICD-10-CM

## 2024-09-27 DIAGNOSIS — D64.9 ANEMIA, UNSPECIFIED TYPE: Primary | ICD-10-CM

## 2024-09-27 DIAGNOSIS — Z91.81 HISTORY OF FALL: ICD-10-CM

## 2024-09-27 DIAGNOSIS — Z98.2 VP (VENTRICULOPERITONEAL) SHUNT STATUS: ICD-10-CM

## 2024-09-27 DIAGNOSIS — E11.9 CONTROLLED TYPE 2 DIABETES MELLITUS WITHOUT COMPLICATION, WITHOUT LONG-TERM CURRENT USE OF INSULIN: ICD-10-CM

## 2024-09-27 DIAGNOSIS — Z12.11 SCREENING FOR COLON CANCER: ICD-10-CM

## 2024-09-27 DIAGNOSIS — G91.9 HYDROCEPHALUS, UNSPECIFIED TYPE: ICD-10-CM

## 2024-09-27 DIAGNOSIS — Z00.00 WELL ADULT EXAM: ICD-10-CM

## 2024-09-27 PROCEDURE — 99214 OFFICE O/P EST MOD 30 MIN: CPT | Mod: PBBFAC | Performed by: NURSE PRACTITIONER

## 2024-09-27 NOTE — PROGRESS NOTES
"   Patient ID: [id number]     Chief Complaint: Diabetes        HPI:     HPI      Jude Kaur Jr. is a 58 y.o. male here today for a follow up.         Immunizations:   Immunization History   Administered Date(s) Administered    COVID-19, MRNA, LN-S, PF (Pfizer) (Purple Cap) 07/24/2021, 08/14/2021        -------------------------------------    BPH (benign prostatic hyperplasia)    No known health problems        Past Surgical History:   Procedure Laterality Date    COLONOSCOPY  05/28/2014    Gonsalo Gonzalez MD    INSERTION OF SHUNT      "shunt in head as a baby"       Review of patient's allergies indicates:  No Known Allergies    Current Outpatient Medications   Medication Instructions    fluticasone propionate (FLONASE) 50 mcg, Each Nostril, Daily    ibuprofen (ADVIL,MOTRIN) 600 mg, Oral, Every 6 hours PRN    mirabegron (MYRBETRIQ) 50 mg, Oral, Daily    ondansetron (ZOFRAN-ODT) 4 mg, Oral, Every 6 hours PRN    oxybutynin (DITROPAN-XL) 10 mg, Oral, Daily    tadalafiL (CIALIS) 20 mg, Oral, Daily PRN    tamsulosin (FLOMAX) 0.8 mg, Oral, Daily       Social History     Socioeconomic History    Marital status:    Tobacco Use    Smoking status: Never     Passive exposure: Never    Smokeless tobacco: Never   Substance and Sexual Activity    Alcohol use: Never    Drug use: Never    Sexual activity: Yes     Partners: Female     Birth control/protection: None     Social Determinants of Health     Financial Resource Strain: Low Risk  (1/31/2024)    Overall Financial Resource Strain (CARDIA)     Difficulty of Paying Living Expenses: Not very hard   Food Insecurity: No Food Insecurity (1/31/2024)    Hunger Vital Sign     Worried About Running Out of Food in the Last Year: Never true     Ran Out of Food in the Last Year: Never true   Transportation Needs: No Transportation Needs (1/31/2024)    PRAPARE - Transportation     Lack of Transportation (Medical): No     Lack of Transportation (Non-Medical): No   Physical " Activity: Inactive (1/31/2024)    Exercise Vital Sign     Days of Exercise per Week: 2 days     Minutes of Exercise per Session: 0 min   Stress: No Stress Concern Present (1/31/2024)    [employer] - Occupational Stress Questionnaire     Feeling of Stress : Not at all   Housing Stability: Low Risk  (1/31/2024)    Housing Stability Vital Sign     Unable to Pay for Housing in the Last Year: No     Number of Places Lived in the Last Year: 1     Unstable Housing in the Last Year: No        Family History   Problem Relation Name Age of Onset    Diabetes Mother Maria C garcia     Heart disease Mother Maria C garcia     Hypertension Mother Maria C garcia     Diabetes Father Jude garcia sr         Patient Care Team:  Pretty Flores FNP as PCP - General (Family Medicine)  Yary House LPN as Care Coordinator     Subjective:     Review of Systems     See HPI for details    Constitutional: Denies Change in appetite. Denies Chills. Denies Fever. Denies Night sweats.  Eye: Denies Blurred vision. Denies Discharge. Denies Eye pain.  ENT: Denies Decreased hearing. Denies Sore throat. Denies Swollen glands.  Respiratory: Denies Cough. Denies Shortness of breath. Denies Shortness of breath with exertion. Denies Wheezing.  Cardiovascular: DeniesChest pain at rest. Denies Chest pain with exertion. Denies Irregular heartbeat. Denies Palpitations. Denies Edema.  Gastrointestinal: Denies Abdominal pain. DeniesDiarrhea. Denies Nausea. Denies Vomiting. Denies Hematemesis or Hematochezia.  Genitourinary: Denies Dysuria. Denies Urinary frequency. Denies Urinary urgency. Denies Blood in urine.  Endocrine: Denies Cold intolerance. Denies Excessive thirst. Denies Heat intolerance. Denies Weight loss. Denies Weight gain.  Musculoskeletal: Denies Painful joints. Denies Weakness.  Integumentary: Denies Rash. Denies Itching. Denies Dry skin.  Neurologic: Denies Dizziness. Denies Fainting. Denies  "Headache.  Psychiatric: Denies Depression. Denies Anxiety. Denies Suicidal/Homicidal ideations.    All Other ROS: Negative except as stated in HPI.       Objective:     Visit Vitals  /76 (BP Location: Right arm, Patient Position: Sitting, BP Method: Large (Automatic))   Pulse 60   Temp 97.6 °F (36.4 °C) (Oral)   Ht 5' 5" (1.651 m)   Wt 81.6 kg (180 lb)   BMI 29.95 kg/m²       Physical Exam    General: Alert and oriented, No acute distress.  Head: Normocephalic, Atraumatic.  Eye: Pupils are equal, round and reactive to light, Extraocular movements are intact, Sclera non-icteric.  Ears/Nose/Throat: Normal, Mucosa moist,Clear.  Neck/Thyroid: Supple, Non-tender, No carotid bruit, No lymphadenopathy, No JVD, Full range of motion.  Respiratory: Clear to auscultation bilaterally; No wheezes, rales or rhonchi,Non-labored respirations, Symmetrical chest wall expansion.  Cardiovascular: Regular rate and rhythm, S1/S2 normal, No murmurs, rubs or gallops.  Gastrointestinal: Soft, Non-tender, Non-distended, Normal bowel sounds, No palpable organomegaly.  Musculoskeletal: Normal range of motion.  Integumentary: Warm, Dry, Intact, No suspicious lesions or rashes.  Extremities: No clubbing, cyanosis or edema  Neurologic: No focal deficits, Cranial Nerves II-XII are grossly intact, Motor strength normal upper and lower extremities, Sensory exam intact.  Psychiatric: Normal interaction, Coherent speech, Euthymic mood, Appropriate affect       Labs Reviewed:     Chemistry:  Lab Results   Component Value Date     05/18/2024    K 4.0 05/18/2024    BUN 11.1 05/18/2024    CREATININE 0.79 05/18/2024    EGFRNORACEVR >60 05/18/2024    GLUCOSE 101 05/18/2024    CALCIUM 9.3 05/18/2024    ALKPHOS 86 05/18/2024    LABPROT 7.4 05/18/2024    ALBUMIN 3.7 05/18/2024    BILIDIR 0.2 03/31/2022    IBILI 0.20 03/31/2022    AST 17 05/18/2024    ALT 44 05/18/2024    MG 1.90 10/11/2023        Lab Results   Component Value Date    HGBA1C 5.7 " 10/23/2023        Hematology:  Lab Results   Component Value Date    WBC 6.17 05/18/2024    HGB 12.2 (L) 05/18/2024    HCT 39.9 (L) 05/18/2024     05/18/2024       Lipid Panel:  Lab Results   Component Value Date    CHOL 144 03/24/2023    HDL 32 (L) 03/24/2023    LDL 92.00 03/24/2023    TRIG 99 03/24/2023    TOTALCHOLEST 5 03/24/2023        Urine:  Lab Results   Component Value Date    APPEARANCEUA Cloudy (A) 05/18/2024    SGUA 1.020 05/18/2024    PROTEINUA 1+ (A) 05/18/2024    KETONESUA Trace (A) 05/18/2024    LEUKOCYTESUR 250 (A) 05/18/2024    RBCUA >100 (A) 05/18/2024    WBCUA 11-20 (A) 05/18/2024    BACTERIA Rare 05/18/2024    SQEPUA Trace 05/18/2024    HYALINECASTS None Seen 05/18/2024    CREATRANDUR 57.6 (L) 10/23/2023        Assessment:       ICD-10-CM ICD-9-CM   1. Anemia, unspecified type  D64.9 285.9   2. Controlled type 2 diabetes mellitus without complication, without long-term current use of insulin  E11.9 250.00   3. History of fall  Z91.81 V15.88   4.  (ventriculoperitoneal) shunt status  Z98.2 V45.2   5. Hydrocephalus, unspecified type  G91.9 331.4   6. Prostate cancer screening  Z12.5 V76.44   7. Well adult exam  Z00.00 V70.0        Plan:     1. Anemia, unspecified type  CBC in 1 month.     2. Controlled type 2 diabetes mellitus without complication, without long-term current use of insulin  A1c 5.7. ADA diet and exercise. Controlled with diet and exercise. Urine microalbumin 10-23-23. DM foot done today. DM eye done 3-18-24. Pt refused statin therapy.     Protective Sensation (w/ 10 gram monofilament):  Right: Intact  Left: Intact    Visual Inspection:  Normal -  Bilateral    Pedal Pulses:   Right: Present  Left: Present    Posterior Tibialis Pulses:   Right:Present  Left: Present       3. History of fall  Encouraged fall precautions. Education provided.     4.  (ventriculoperitoneal) shunt status  Pt had CT head done 1-30-23 showing  CT Head Without Contrast  Order: [id number]  Status:  Final result     Visible to patient: Yes (not seen)     Next appt: 03/01/2023 at 11:30 AM in Urology (Andreea Villatoro, )     Dx: Dizziness; Hydrocephalus, unspecified...     0 Result Notes  Details     Reading Physician Reading Date Result Priority   Luis Blanco MD  [phone] 1/30/2023 Routine      Narrative & Impression  EXAMINATION:  CT HEAD WITHOUT CONTRAST     CLINICAL HISTORY:  57-year-old man for follow-up of hydrocephalus.     TECHNIQUE:  Thin slice noncontrast CT imaging was performed from below the skull base through the skull vertex per routine protocol and with coronal and sagittal reformatted images generated from the axial data set.  Automatic dose modulation and/or weight based mA/kv utilized to achieve as low as reasonable radiation dose.     COMPARISON:  Head CT without contrast 08/05/2020.     FINDINGS:  There are bilateral parietal approach ventriculoperitoneal shunt catheters remaining unchanged in position.  The ventricles are similar in morphology to the prior CT exam with diffusely dilated lateral ventricles and normal size 3rd and 4th ventricles.  No acute parenchymal abnormality is identified.  There is no intracranial hemorrhage or abnormal fluid collection.  No posterior fossa pathology is identified.  There is no significant extracranial soft tissue abnormality.  There is no acute osseous abnormality or osseous lesion.  Chronic thick periosteal reaction along the inner table of the left calvarium is unchanged in the interval.  The paranasal sinuses are well developed with mild, thin mucosal thickening at the bilateral ethmoid sinuses and several mucous retention cysts in the bilateral maxillary sinuses.  No paranasal sinus fluid.  The well pneumatized bilateral mastoids and middle ears are clear.     Impression:     Chronic dilation of the lateral ventricles which is unchanged since 08/05/2020 with bilateral ventriculoperitoneal shunts remaining in place and with no acute pathology  at the head or significant change since 08/05/2020.     Mild chronic sinusitis at the ethmoids and maxillary sinuses.        Electronically signed by: Luis Blanco  Date:                                            01/30/2023  Time:                                           10:34             Exam Ended: 01/30/23 10:24 Last Resulted: 01/30/23 10:34              5. Hydrocephalus, unspecified type  Pt had CT head done 1-30-23 showing  CT Head Without Contrast  Order: [id number]  Status: Final result     Visible to patient: Yes (not seen)     Next appt: 03/01/2023 at 11:30 AM in Urology (Andreea Villatoro, )     Dx: Dizziness; Hydrocephalus, unspecified...     0 Result Notes  Details     Reading Physician Reading Date Result Priority   Luis Blanco MD  [phone] 1/30/2023 Routine      Narrative & Impression  EXAMINATION:  CT HEAD WITHOUT CONTRAST     CLINICAL HISTORY:  57-year-old man for follow-up of hydrocephalus.     TECHNIQUE:  Thin slice noncontrast CT imaging was performed from below the skull base through the skull vertex per routine protocol and with coronal and sagittal reformatted images generated from the axial data set.  Automatic dose modulation and/or weight based mA/kv utilized to achieve as low as reasonable radiation dose.     COMPARISON:  Head CT without contrast 08/05/2020.     FINDINGS:  There are bilateral parietal approach ventriculoperitoneal shunt catheters remaining unchanged in position.  The ventricles are similar in morphology to the prior CT exam with diffusely dilated lateral ventricles and normal size 3rd and 4th ventricles.  No acute parenchymal abnormality is identified.  There is no intracranial hemorrhage or abnormal fluid collection.  No posterior fossa pathology is identified.  There is no significant extracranial soft tissue abnormality.  There is no acute osseous abnormality or osseous lesion.  Chronic thick periosteal reaction along the inner table of the left calvarium is  unchanged in the interval.  The paranasal sinuses are well developed with mild, thin mucosal thickening at the bilateral ethmoid sinuses and several mucous retention cysts in the bilateral maxillary sinuses.  No paranasal sinus fluid.  The well pneumatized bilateral mastoids and middle ears are clear.     Impression:     Chronic dilation of the lateral ventricles which is unchanged since 08/05/2020 with bilateral ventriculoperitoneal shunts remaining in place and with no acute pathology at the head or significant change since 08/05/2020.     Mild chronic sinusitis at the ethmoids and maxillary sinuses.        Electronically signed by: Luis Blanco  Date:                                            01/30/2023  Time:                                           10:34             Exam Ended: 01/30/23 10:24 Last Resulted: 01/30/23 10:34              6. Prostate cancer screening  UTD PSA.    7. Well adult exam  Labs in 1 month. UTD PSA. Colonoscopy done with Edson Haro due May 2024- will re-address on next visit.          Follow up in about 1 month (around 10/27/2024) for with labs 1 week prior to appt. . In addition to their scheduled follow up, the patient has also been instructed to follow up on as needed basis.     Future Appointments   Date Time Provider Department Center   3/5/2025  8:30 AM Andreea Flores,  [facility] Davey

## 2024-10-04 ENCOUNTER — HOSPITAL ENCOUNTER (EMERGENCY)
Facility: HOSPITAL | Age: 59
Discharge: HOME | End: 2024-10-04
Attending: EMERGENCY MEDICINE
Payer: MEDICARE

## 2024-10-04 ENCOUNTER — OFFICE VISIT (OUTPATIENT)
Dept: UROLOGY | Facility: CLINIC | Age: 59
End: 2024-10-04
Attending: INTERNAL MEDICINE
Payer: MEDICARE

## 2024-10-04 VITALS
HEART RATE: 60 BPM | TEMPERATURE: 98 F | RESPIRATION RATE: 20 BRPM | SYSTOLIC BLOOD PRESSURE: 105 MMHG | WEIGHT: 186 LBS | DIASTOLIC BLOOD PRESSURE: 71 MMHG | BODY MASS INDEX: 30.99 KG/M2 | HEIGHT: 65 IN | OXYGEN SATURATION: 100 %

## 2024-10-04 VITALS
WEIGHT: 183 LBS | OXYGEN SATURATION: 99 % | HEART RATE: 59 BPM | RESPIRATION RATE: 18 BRPM | TEMPERATURE: 98 F | SYSTOLIC BLOOD PRESSURE: 136 MMHG | BODY MASS INDEX: 30.45 KG/M2 | DIASTOLIC BLOOD PRESSURE: 88 MMHG

## 2024-10-04 DIAGNOSIS — N52.8 OTHER MALE ERECTILE DYSFUNCTION: ICD-10-CM

## 2024-10-04 DIAGNOSIS — R30.0 DYSURIA: Primary | ICD-10-CM

## 2024-10-04 DIAGNOSIS — N40.1 BPH WITH OBSTRUCTION/LOWER URINARY TRACT SYMPTOMS: ICD-10-CM

## 2024-10-04 DIAGNOSIS — N13.8 BPH WITH OBSTRUCTION/LOWER URINARY TRACT SYMPTOMS: ICD-10-CM

## 2024-10-04 DIAGNOSIS — R35.0 URINARY FREQUENCY: ICD-10-CM

## 2024-10-04 LAB
BILIRUB SERPL-MCNC: NEGATIVE MG/DL
BLOOD URINE, POC: NORMAL
COLOR, POC UA: YELLOW
GLUCOSE UR QL STRIP: NEGATIVE
KETONES UR QL STRIP: NEGATIVE
LEUKOCYTE ESTERASE URINE, POC: NEGATIVE
NITRITE, POC UA: NEGATIVE
PH, POC UA: 5.5
POC RESIDUAL URINE VOLUME: 119 ML (ref 0–100)
PROTEIN, POC: NEGATIVE
SPECIFIC GRAVITY, POC UA: 1.03
UROBILINOGEN, POC UA: 0.2

## 2024-10-04 PROCEDURE — 99214 OFFICE O/P EST MOD 30 MIN: CPT | Mod: S$PBB,,, | Performed by: UROLOGY

## 2024-10-04 PROCEDURE — 51798 US URINE CAPACITY MEASURE: CPT | Mod: PBBFAC | Performed by: UROLOGY

## 2024-10-04 PROCEDURE — 99282 EMERGENCY DEPT VISIT SF MDM: CPT

## 2024-10-04 PROCEDURE — 81001 URINALYSIS AUTO W/SCOPE: CPT | Mod: PBBFAC | Performed by: UROLOGY

## 2024-10-04 PROCEDURE — 99213 OFFICE O/P EST LOW 20 MIN: CPT | Mod: PBBFAC,27,25 | Performed by: UROLOGY

## 2024-10-04 PROCEDURE — 87184 SC STD DISK METHOD PER PLATE: CPT | Performed by: UROLOGY

## 2024-10-04 PROCEDURE — 87086 URINE CULTURE/COLONY COUNT: CPT | Performed by: UROLOGY

## 2024-10-04 PROCEDURE — 87077 CULTURE AEROBIC IDENTIFY: CPT | Performed by: UROLOGY

## 2024-10-04 RX ORDER — TADALAFIL 5 MG/1
5 TABLET ORAL DAILY PRN
Qty: 90 TABLET | Refills: 3 | Status: SHIPPED | OUTPATIENT
Start: 2024-10-04 | End: 2025-10-04

## 2024-10-04 RX ORDER — SULFAMETHOXAZOLE AND TRIMETHOPRIM 800; 160 MG/1; MG/1
1 TABLET ORAL 2 TIMES DAILY
Qty: 14 TABLET | Refills: 0 | Status: SHIPPED | OUTPATIENT
Start: 2024-10-04 | End: 2024-10-08 | Stop reason: ALTCHOICE

## 2024-10-04 NOTE — ED PROVIDER NOTES
"Encounter Date: 10/4/2024       History     Chief Complaint   Patient presents with    Urinary Frequency     PT W CO PROSTATE PROBLEMS, CO URINARY FREQ., DYSURIA AND PRESSURE X 4 DAYS.      58-year-old presents with burning with urination and lower abdominal pain.  He has a history of BPH and sees Dr. Villatoro.  In May he had to have an indwelling Velázquez catheter due to urinary retention.  He states this is not the inability to urinate but a burning and smell to his urine.  Denies fever.    The history is provided by the patient. No  was used.     Review of patient's allergies indicates:  No Known Allergies  Past Medical History:   Diagnosis Date    BPH (benign prostatic hyperplasia)     No known health problems      Past Surgical History:   Procedure Laterality Date    COLONOSCOPY  05/28/2014    Gonsalo Gonzalez MD    INSERTION OF SHUNT      "shunt in head as a baby"     Family History   Problem Relation Name Age of Onset    Diabetes Mother Maria C garcia     Heart disease Mother Maria C garcia     Hypertension Mother Maria C garcia     Diabetes Father Jude garcia sr      Social History     Tobacco Use    Smoking status: Never     Passive exposure: Never    Smokeless tobacco: Never   Substance Use Topics    Alcohol use: Never    Drug use: Never     Review of Systems   Constitutional:  Negative for fever.   HENT:  Negative for sore throat.    Respiratory:  Negative for shortness of breath.    Cardiovascular:  Negative for chest pain.   Gastrointestinal:  Negative for nausea.   Genitourinary:  Positive for dysuria and frequency.   Musculoskeletal:  Negative for back pain.   Skin:  Negative for rash.   Neurological:  Negative for weakness.   Hematological:  Does not bruise/bleed easily.       Physical Exam     Initial Vitals [10/04/24 0803]   BP Pulse Resp Temp SpO2   136/88 (!) 59 18 97.9 °F (36.6 °C) 99 %      MAP       --         Physical Exam    Nursing note and vitals " reviewed.  Constitutional: He appears well-developed and well-nourished. No distress.   HENT:   Head: Normocephalic and atraumatic.   Eyes: Pupils are equal, round, and reactive to light.   Neck: Neck supple.   Normal range of motion.  Cardiovascular:  Normal rate, regular rhythm, normal heart sounds and intact distal pulses.           Pulmonary/Chest: Breath sounds normal.   Abdominal: Abdomen is soft. Bowel sounds are normal.   Musculoskeletal:         General: Normal range of motion.      Cervical back: Normal range of motion and neck supple.     Neurological: He is alert and oriented to person, place, and time. He has normal strength. GCS score is 15. GCS eye subscore is 4. GCS verbal subscore is 5. GCS motor subscore is 6.   Skin: Skin is warm and dry.   Psychiatric: Thought content normal.         ED Course   Procedures  Labs Reviewed   URINALYSIS, REFLEX TO URINE CULTURE          Imaging Results    None          Medications - No data to display  Medical Decision Making  58-year-old presents with burning with urination and lower abdominal pain.  He has a history of BPH and sees Dr. Villatoro.  In May he had to have an indwelling Velázquez catheter due to urinary retention.  He states this is not the inability to urinate but a burning and smell to his urine.  Denies fever.    Before labs were ordered inpatient able to urinate, Dr. Villatoro office spoke to the patient and told him to report to the office now.  He was discharged from the ED. ER precautions given.  Go to Dr. Villatoro office now.      Amount and/or Complexity of Data Reviewed  External Data Reviewed: notes.     Details: Notes from Dr. Villatoro on 9/4/24  Labs: ordered. Decision-making details documented in ED Course.    Risk  Risk Details: Risk Details: Given strict ED return precautions. I have spoken with the patient and/or caregivers. I have explained the patient's condition, diagnoses and treatment plan based on the information available to me at this  time. I have answered the patient's and/or caregiver's questions and addressed any concerns. The patient and/or caregivers have as good an understanding of the patient's diagnosis, condition and treatment plan as can be expected at this point. The vital signs have been stable. The patient's condition is stable and appropriate for discharge from the emergency department.      The patient will pursue further outpatient evaluation with the primary care physician or other designated or consulting physician as outlined in the discharge instructions. The patient and/or caregivers are agreeable to this plan of care and follow-up instructions have been explained in detail. The patient and/or caregivers have received these instructions in written format and have expressed an understanding of the discharge instructions. The patient and/or caregivers are aware that any significant change in condition or worsening of symptoms should prompt an immediate return to this or the closest emergency department or a call to 911.           Additional MDM:   Differential Diagnosis:   Urethritis, cystitis, pyelonephritis, herpes, candidiasis, among others              ED Course as of 10/04/24 0830   Fri Oct 04, 2024   0828 Patient informed triage nurse he spoke to Dr. Flores's office, they told him to report there now. He will be discharged. [LS]      ED Course User Index  [LS] Heather Agee, FNP                   It is important that you follow up with your primary care provider or specialist if indicated for further evaluation, workup, and treatment as necessary. The exam and treatment you received in Emergency Department was for an urgent problem and NOT INTENDED AS COMPLETE CARE. It is important that you FOLLOW UP with a doctor for ongoing care. If your symptoms become WORSE or you DO NOT IMPROVE and you are unable to reach your health care provider, you should RETURN to the Emergency Department          Clinical  Impression:  Final diagnoses:  [R30.0] Dysuria (Primary)          ED Disposition Condition    Discharge Stable          ED Prescriptions    None       Follow-up Information       Follow up With Specialties Details Why Contact Info    Andreea Flores, DO Urology [facility]  [address] [facility] [address] [phone]               Heather Agee, CORYP  10/04/24 1067

## 2024-10-08 ENCOUNTER — TELEPHONE (OUTPATIENT)
Dept: UROLOGY | Facility: CLINIC | Age: 59
End: 2024-10-08
Payer: MEDICARE

## 2024-10-08 LAB — BACTERIA UR CULT: ABNORMAL

## 2024-10-08 RX ORDER — LEVOFLOXACIN 500 MG/1
500 TABLET, FILM COATED ORAL DAILY
Qty: 7 TABLET | Refills: 0 | Status: SHIPPED | OUTPATIENT
Start: 2024-10-08

## 2024-10-08 NOTE — TELEPHONE ENCOUNTER
Urine culture grew out 10,0000 Strep.  He is on Bactrim but that is not the right medication.  I have sent Levaquin to the pharmacy.  Please inform him and have him stop Bactrim and take Levaquin.

## 2024-10-31 ENCOUNTER — PATIENT OUTREACH (OUTPATIENT)
Facility: CLINIC | Age: 59
End: 2024-10-31
Payer: MEDICARE

## 2024-10-31 DIAGNOSIS — Z12.11 SCREENING FOR COLON CANCER: Primary | ICD-10-CM

## 2024-11-05 DIAGNOSIS — E11.9 CONTROLLED TYPE 2 DIABETES MELLITUS WITHOUT COMPLICATION, WITHOUT LONG-TERM CURRENT USE OF INSULIN: Primary | ICD-10-CM

## 2024-11-18 ENCOUNTER — LAB VISIT (OUTPATIENT)
Dept: LAB | Facility: HOSPITAL | Age: 59
End: 2024-11-18
Attending: NURSE PRACTITIONER
Payer: MEDICARE

## 2024-11-18 DIAGNOSIS — E11.9 CONTROLLED TYPE 2 DIABETES MELLITUS WITHOUT COMPLICATION, WITHOUT LONG-TERM CURRENT USE OF INSULIN: ICD-10-CM

## 2024-11-18 LAB
CHOLEST SERPL-MCNC: 150 MG/DL
CHOLEST/HDLC SERPL: 4 {RATIO} (ref 0–5)
CREAT UR-MCNC: 164.6 MG/DL (ref 63–166)
EST. AVERAGE GLUCOSE BLD GHB EST-MCNC: 122.6 MG/DL
HBA1C MFR BLD: 5.9 %
HDLC SERPL-MCNC: 36 MG/DL (ref 35–60)
LDLC SERPL CALC-MCNC: 96 MG/DL (ref 50–140)
MICROALBUMIN UR-MCNC: 10.9 UG/ML
MICROALBUMIN/CREAT RATIO PNL UR: 6.6 MG/GM CR (ref 0–30)
TRIGL SERPL-MCNC: 89 MG/DL (ref 34–140)
VLDLC SERPL CALC-MCNC: 18 MG/DL

## 2024-11-18 PROCEDURE — 80061 LIPID PANEL: CPT

## 2024-11-18 PROCEDURE — 82570 ASSAY OF URINE CREATININE: CPT

## 2024-11-18 PROCEDURE — 83036 HEMOGLOBIN GLYCOSYLATED A1C: CPT

## 2024-11-18 PROCEDURE — 36415 COLL VENOUS BLD VENIPUNCTURE: CPT

## 2024-11-25 ENCOUNTER — OFFICE VISIT (OUTPATIENT)
Dept: INTERNAL MEDICINE | Facility: CLINIC | Age: 59
End: 2024-11-25
Payer: MEDICARE

## 2024-11-25 VITALS
RESPIRATION RATE: 18 BRPM | SYSTOLIC BLOOD PRESSURE: 116 MMHG | BODY MASS INDEX: 31.16 KG/M2 | WEIGHT: 187 LBS | DIASTOLIC BLOOD PRESSURE: 74 MMHG | HEIGHT: 65 IN | OXYGEN SATURATION: 99 % | HEART RATE: 69 BPM | TEMPERATURE: 98 F

## 2024-11-25 DIAGNOSIS — E11.9 CONTROLLED TYPE 2 DIABETES MELLITUS WITHOUT COMPLICATION, WITHOUT LONG-TERM CURRENT USE OF INSULIN: ICD-10-CM

## 2024-11-25 DIAGNOSIS — Z98.2 VP (VENTRICULOPERITONEAL) SHUNT STATUS: ICD-10-CM

## 2024-11-25 DIAGNOSIS — Z00.00 WELL ADULT EXAM: ICD-10-CM

## 2024-11-25 DIAGNOSIS — G91.9 HYDROCEPHALUS, UNSPECIFIED TYPE: ICD-10-CM

## 2024-11-25 DIAGNOSIS — D64.9 ANEMIA, UNSPECIFIED TYPE: Primary | ICD-10-CM

## 2024-11-25 DIAGNOSIS — Z12.5 PROSTATE CANCER SCREENING: ICD-10-CM

## 2024-11-25 DIAGNOSIS — Z91.81 HISTORY OF FALL: ICD-10-CM

## 2024-11-25 DIAGNOSIS — R56.9 CONVULSIONS, UNSPECIFIED CONVULSION TYPE: ICD-10-CM

## 2024-11-25 PROCEDURE — 99213 OFFICE O/P EST LOW 20 MIN: CPT | Mod: PBBFAC | Performed by: NURSE PRACTITIONER

## 2024-11-25 NOTE — PROGRESS NOTES
"   Patient ID: [id number]     Chief Complaint: lab results (F/U GI REFERRAL FOR COLONOSCOPY)        HPI:     HPI      Jude Kaur Jr. is a 58 y.o. male here today for a follow up.         Immunizations:   Immunization History   Administered Date(s) Administered    COVID-19, MRNA, LN-S, PF (Pfizer) (Purple Cap) 07/24/2021, 08/14/2021        -------------------------------------    BPH (benign prostatic hyperplasia)    No known health problems        Past Surgical History:   Procedure Laterality Date    COLONOSCOPY  05/28/2014    Gonsalo Gonzalez MD    INSERTION OF SHUNT      "shunt in head as a baby"       Review of patient's allergies indicates:  No Known Allergies    Current Outpatient Medications   Medication Instructions    tamsulosin (FLOMAX) 0.8 mg, Oral, Daily       Social History     Socioeconomic History    Marital status:    Tobacco Use    Smoking status: Never     Passive exposure: Never    Smokeless tobacco: Never   Substance and Sexual Activity    Alcohol use: Never    Drug use: Never    Sexual activity: Yes     Partners: Female     Birth control/protection: None     Social Drivers of Health     Financial Resource Strain: Low Risk  (11/25/2024)    Overall Financial Resource Strain (CARDIA)     Difficulty of Paying Living Expenses: Not hard at all   Food Insecurity: No Food Insecurity (11/25/2024)    Hunger Vital Sign     Worried About Running Out of Food in the Last Year: Never true     Ran Out of Food in the Last Year: Never true   Transportation Needs: No Transportation Needs (11/25/2024)    TRANSPORTATION NEEDS     Transportation : No   Physical Activity: Inactive (11/25/2024)    Exercise Vital Sign     Days of Exercise per Week: 0 days     Minutes of Exercise per Session: 0 min   Stress: No Stress Concern Present (11/25/2024)    [employer] [place] [employer] - Occupational Stress Questionnaire     Feeling of Stress : Not at all   Housing Stability: Low Risk  (11/25/2024)    " "Housing Stability Vital Sign     Unable to Pay for Housing in the Last Year: No     Homeless in the Last Year: No        Family History   Problem Relation Name Age of Onset    Diabetes Mother Maria C garcia     Heart disease Mother Maria C garcia     Hypertension Mother Maria C garcia     Diabetes Father Jude garcia sr         Patient Care Team:  Pretty Flores FNP as PCP - General (Family Medicine)  Yary House LPN as Care Coordinator  Brennan Yuan OD (Optometry)     Subjective:     Review of Systems     See HPI for details    Constitutional: Denies Change in appetite. Denies Chills. Denies Fever. Denies Night sweats.  Eye: Denies Blurred vision. Denies Discharge. Denies Eye pain.  ENT: Denies Decreased hearing. Denies Sore throat. Denies Swollen glands.  Respiratory: Denies Cough. Denies Shortness of breath. Denies Shortness of breath with exertion. Denies Wheezing.  Cardiovascular: DeniesChest pain at rest. Denies Chest pain with exertion. Denies Irregular heartbeat. Denies Palpitations. Denies Edema.  Gastrointestinal: Denies Abdominal pain. DeniesDiarrhea. Denies Nausea. Denies Vomiting. Denies Hematemesis or Hematochezia.  Genitourinary: Denies Dysuria. Denies Urinary frequency. Denies Urinary urgency. Denies Blood in urine.  Endocrine: Denies Cold intolerance. Denies Excessive thirst. Denies Heat intolerance. Denies Weight loss. Denies Weight gain.  Musculoskeletal: Denies Painful joints. Denies Weakness.  Integumentary: Denies Rash. Denies Itching. Denies Dry skin.  Neurologic: Denies Dizziness. Denies Fainting. Denies Headache.  Psychiatric: Denies Depression. Denies Anxiety. Denies Suicidal/Homicidal ideations.    All Other ROS: Negative except as stated in HPI.       Objective:     Visit Vitals  /74 (BP Location: Right arm, Patient Position: Sitting)   Pulse 69   Temp 97.9 °F (36.6 °C) (Oral)   Resp 18   Ht 5' 5" (1.651 m)   Wt 84.8 kg (187 lb)   SpO2 99%   BMI 31.12 kg/m² "       Physical Exam    General: Alert and oriented, No acute distress.  Head: Normocephalic, Atraumatic.  Eye: Pupils are equal, round and reactive to light, Extraocular movements are intact, Sclera non-icteric.  Ears/Nose/Throat: Normal, Mucosa moist,Clear.  Neck/Thyroid: Supple, Non-tender, No carotid bruit, No lymphadenopathy, No JVD, Full range of motion.  Respiratory: Clear to auscultation bilaterally; No wheezes, rales or rhonchi,Non-labored respirations, Symmetrical chest wall expansion.  Cardiovascular: Regular rate and rhythm, S1/S2 normal, No murmurs, rubs or gallops.  Gastrointestinal: Soft, Non-tender, Non-distended, Normal bowel sounds, No palpable organomegaly.  Musculoskeletal: Normal range of motion.  Integumentary: Warm, Dry, Intact, No suspicious lesions or rashes.  Extremities: No clubbing, cyanosis or edema  Neurologic: No focal deficits, Cranial Nerves II-XII are grossly intact, Motor strength normal upper and lower extremities, Sensory exam intact.  Psychiatric: Normal interaction, Coherent speech, Euthymic mood, Appropriate affect       Labs Reviewed:     Chemistry:  Lab Results   Component Value Date     05/18/2024    K 4.0 05/18/2024    BUN 11.1 05/18/2024    CREATININE 0.79 05/18/2024    EGFRNORACEVR >60 05/18/2024    GLUCOSE 101 05/18/2024    CALCIUM 9.3 05/18/2024    ALKPHOS 86 05/18/2024    LABPROT 7.4 05/18/2024    ALBUMIN 3.7 05/18/2024    BILIDIR 0.2 03/31/2022    IBILI 0.20 03/31/2022    AST 17 05/18/2024    ALT 44 05/18/2024    MG 1.90 10/11/2023        Lab Results   Component Value Date    HGBA1C 5.9 11/18/2024        Hematology:  Lab Results   Component Value Date    WBC 6.17 05/18/2024    HGB 12.2 (L) 05/18/2024    HCT 39.9 (L) 05/18/2024     05/18/2024       Lipid Panel:  Lab Results   Component Value Date    CHOL 150 11/18/2024    HDL 36 11/18/2024    LDL 96.00 11/18/2024    TRIG 89 11/18/2024    TOTALCHOLEST 4 11/18/2024        Urine:  Lab Results   Component  Value Date    APPEARANCEUA Cloudy (A) 05/18/2024    SGUA 1.020 05/18/2024    PROTEINUA 1+ (A) 05/18/2024    KETONESUA Trace (A) 05/18/2024    LEUKOCYTESUR 250 (A) 05/18/2024    RBCUA >100 (A) 05/18/2024    WBCUA 11-20 (A) 05/18/2024    BACTERIA Rare 05/18/2024    SQEPUA Trace 05/18/2024    HYALINECASTS None Seen 05/18/2024    CREATRANDUR 164.6 11/18/2024        Assessment:       ICD-10-CM ICD-9-CM   1. Anemia, unspecified type  D64.9 285.9   2. Controlled type 2 diabetes mellitus without complication, without long-term current use of insulin  E11.9 250.00   3. History of fall  Z91.81 V15.88   4.  (ventriculoperitoneal) shunt status  Z98.2 V45.2   5. Hydrocephalus, unspecified type  G91.9 331.4   6. Prostate cancer screening  Z12.5 V76.44   7. Well adult exam  Z00.00 V70.0   8. Convulsions, unspecified convulsion type  R56.9 780.39        Plan:     1. Anemia, unspecified type (Primary)  CBC stable. CBC in 6 months.     2. Controlled type 2 diabetes mellitus without complication, without long-term current use of insulin  A1c 5.7. ADA diet and exercise. Controlled with diet and exercise. Urine microalbumin 11-18-24. DM foot done 9-27-24. DM eye done 3-18-24. Pt refused statin therapy.      3. History of fall  Encouraged fall precautions. Education provided.     4.  (ventriculoperitoneal) shunt status  Pt had CT head done 1-30-23 showing  CT Head Without Contrast  Order: [id number]  Status: Final result     Visible to patient: Yes (not seen)     Next appt: 03/01/2023 at 11:30 AM in Urology (Andreea Villatoro DO)     Dx: Dizziness; Hydrocephalus, unspecified...     0 Result Notes  Details     Reading Physician Reading Date Result Priority   Luis Blanco MD  [phone] 1/30/2023 Routine      Narrative & Impression  EXAMINATION:  CT HEAD WITHOUT CONTRAST     CLINICAL HISTORY:  57-year-old man for follow-up of hydrocephalus.     TECHNIQUE:  Thin slice noncontrast CT imaging was performed from below the skull base  through the skull vertex per routine protocol and with coronal and sagittal reformatted images generated from the axial data set.  Automatic dose modulation and/or weight based mA/kv utilized to achieve as low as reasonable radiation dose.     COMPARISON:  Head CT without contrast 08/05/2020.     FINDINGS:  There are bilateral parietal approach ventriculoperitoneal shunt catheters remaining unchanged in position.  The ventricles are similar in morphology to the prior CT exam with diffusely dilated lateral ventricles and normal size 3rd and 4th ventricles.  No acute parenchymal abnormality is identified.  There is no intracranial hemorrhage or abnormal fluid collection.  No posterior fossa pathology is identified.  There is no significant extracranial soft tissue abnormality.  There is no acute osseous abnormality or osseous lesion.  Chronic thick periosteal reaction along the inner table of the left calvarium is unchanged in the interval.  The paranasal sinuses are well developed with mild, thin mucosal thickening at the bilateral ethmoid sinuses and several mucous retention cysts in the bilateral maxillary sinuses.  No paranasal sinus fluid.  The well pneumatized bilateral mastoids and middle ears are clear.     Impression:     Chronic dilation of the lateral ventricles which is unchanged since 08/05/2020 with bilateral ventriculoperitoneal shunts remaining in place and with no acute pathology at the head or significant change since 08/05/2020.     Mild chronic sinusitis at the ethmoids and maxillary sinuses.        Electronically signed by: Luis Blanco  Date:                                            01/30/2023  Time:                                           10:34             Exam Ended: 01/30/23 10:24 Last Resulted: 01/30/23 10:34              5. Hydrocephalus, unspecified type  Pt had CT head done 1-30-23 showing  CT Head Without Contrast  Order: [id number]  Status: Final result     Visible to patient: Yes  (not seen)     Next appt: 03/01/2023 at 11:30 AM in Urology (Andreea Villatoro, )     Dx: Dizziness; Hydrocephalus, unspecified...     0 Result Notes  Details     Reading Physician Reading Date Result Priority   Luis Blanco MD  [phone] 1/30/2023 Routine      Narrative & Impression  EXAMINATION:  CT HEAD WITHOUT CONTRAST     CLINICAL HISTORY:  57-year-old man for follow-up of hydrocephalus.     TECHNIQUE:  Thin slice noncontrast CT imaging was performed from below the skull base through the skull vertex per routine protocol and with coronal and sagittal reformatted images generated from the axial data set.  Automatic dose modulation and/or weight based mA/kv utilized to achieve as low as reasonable radiation dose.     COMPARISON:  Head CT without contrast 08/05/2020.     FINDINGS:  There are bilateral parietal approach ventriculoperitoneal shunt catheters remaining unchanged in position.  The ventricles are similar in morphology to the prior CT exam with diffusely dilated lateral ventricles and normal size 3rd and 4th ventricles.  No acute parenchymal abnormality is identified.  There is no intracranial hemorrhage or abnormal fluid collection.  No posterior fossa pathology is identified.  There is no significant extracranial soft tissue abnormality.  There is no acute osseous abnormality or osseous lesion.  Chronic thick periosteal reaction along the inner table of the left calvarium is unchanged in the interval.  The paranasal sinuses are well developed with mild, thin mucosal thickening at the bilateral ethmoid sinuses and several mucous retention cysts in the bilateral maxillary sinuses.  No paranasal sinus fluid.  The well pneumatized bilateral mastoids and middle ears are clear.     Impression:     Chronic dilation of the lateral ventricles which is unchanged since 08/05/2020 with bilateral ventriculoperitoneal shunts remaining in place and with no acute pathology at the head or significant change since  08/05/2020.     Mild chronic sinusitis at the ethmoids and maxillary sinuses.        Electronically signed by: Luis Blanco  Date:                                            01/30/2023  Time:                                           10:34             Exam Ended: 01/30/23 10:24 Last Resulted: 01/30/23 10:34              6. Prostate cancer screening  PSA in 6 months.     7. Well adult exam  Labs in 6 months. PSA in 6 months. [facility]- will re-address on next visit. Pt referred to Gonsalo MCDUFFIE MD for colonoscopy on 10-31-24.     8. Unspecified convulsions  Denies seizure activity. ER precautions given.       Follow up in about 6 months (around 5/25/2025) for with labs 1 week prior to appt. . In addition to their scheduled follow up, the patient has also been instructed to follow up on as needed basis.     Future Appointments   Date Time Provider Department Center   3/5/2025  8:30 AM Andreea Flores DO [facility] GULSHAN Davey

## 2024-12-02 LAB — CRC RECOMMENDATION EXT: NORMAL

## 2024-12-07 DIAGNOSIS — N40.1 BPH WITH OBSTRUCTION/LOWER URINARY TRACT SYMPTOMS: ICD-10-CM

## 2024-12-07 DIAGNOSIS — N13.8 BPH WITH OBSTRUCTION/LOWER URINARY TRACT SYMPTOMS: ICD-10-CM

## 2024-12-07 RX ORDER — TAMSULOSIN HYDROCHLORIDE 0.4 MG/1
0.8 CAPSULE ORAL DAILY
Qty: 90 CAPSULE | Refills: 3 | Status: SHIPPED | OUTPATIENT
Start: 2024-12-07

## 2024-12-18 ENCOUNTER — DOCUMENTATION ONLY (OUTPATIENT)
Dept: INTERNAL MEDICINE | Facility: CLINIC | Age: 59
End: 2024-12-18
Payer: MEDICARE

## 2025-02-04 ENCOUNTER — PATIENT OUTREACH (OUTPATIENT)
Facility: CLINIC | Age: 60
End: 2025-02-04
Payer: MEDICARE

## 2025-02-04 NOTE — PROGRESS NOTES
Health Maintenance Topic(s) Outreach Outcomes & Actions Taken:    Medication Adherence / Statins - Outreach Outcomes & Actions Taken  : Patient Declined, Provider Notified if Needed and provider aware     Additional Notes:  VBC follow up.     Eye Exam request at last outreach. Records received inconclusive as to whether a comprehensive/diabetes eye exam was performed.  Spoke with pt states has upcoming appt for eyeglasses. Informed pt it is important to notify the optometrist that he had diabetes and needs a comprehensive eye exam. Understanding verbalized.     Called [facility] to inquire if last eye exam performed 3/18/2024 was comprehensive. Office will return call after record search.     Next PCP F/U: 5/57/2025  Health Maintenance Topics Overdue:  VBHM Score: 0     Patient is not due for any topics at this time.            Care Management, Digital Medicine, and/or Education Referrals      Next Steps - Referral Actions: Digital Medicine Outcomes and Actions Taken: Needs review

## 2025-03-03 ENCOUNTER — LAB VISIT (OUTPATIENT)
Dept: LAB | Facility: HOSPITAL | Age: 60
End: 2025-03-03
Attending: UROLOGY
Payer: MEDICARE

## 2025-03-03 DIAGNOSIS — N40.1 BPH WITH OBSTRUCTION/LOWER URINARY TRACT SYMPTOMS: ICD-10-CM

## 2025-03-03 DIAGNOSIS — N13.8 BPH WITH OBSTRUCTION/LOWER URINARY TRACT SYMPTOMS: ICD-10-CM

## 2025-03-03 LAB — PSA SERPL-MCNC: 4.84 NG/ML

## 2025-03-03 PROCEDURE — 36415 COLL VENOUS BLD VENIPUNCTURE: CPT

## 2025-03-03 PROCEDURE — 84153 ASSAY OF PSA TOTAL: CPT

## 2025-03-05 ENCOUNTER — OFFICE VISIT (OUTPATIENT)
Dept: UROLOGY | Facility: CLINIC | Age: 60
End: 2025-03-05
Payer: MEDICARE

## 2025-03-05 VITALS
WEIGHT: 182.19 LBS | OXYGEN SATURATION: 98 % | RESPIRATION RATE: 20 BRPM | BODY MASS INDEX: 30.35 KG/M2 | SYSTOLIC BLOOD PRESSURE: 116 MMHG | HEIGHT: 65 IN | HEART RATE: 69 BPM | DIASTOLIC BLOOD PRESSURE: 74 MMHG | TEMPERATURE: 98 F

## 2025-03-05 DIAGNOSIS — N40.1 BPH WITH OBSTRUCTION/LOWER URINARY TRACT SYMPTOMS: Primary | ICD-10-CM

## 2025-03-05 DIAGNOSIS — R97.20 ELEVATED PSA: ICD-10-CM

## 2025-03-05 DIAGNOSIS — R39.15 URINARY URGENCY: ICD-10-CM

## 2025-03-05 DIAGNOSIS — N13.8 BPH WITH OBSTRUCTION/LOWER URINARY TRACT SYMPTOMS: Primary | ICD-10-CM

## 2025-03-05 PROCEDURE — 99214 OFFICE O/P EST MOD 30 MIN: CPT | Mod: S$PBB,,, | Performed by: UROLOGY

## 2025-03-05 PROCEDURE — 99213 OFFICE O/P EST LOW 20 MIN: CPT | Mod: PBBFAC | Performed by: UROLOGY

## 2025-03-05 PROCEDURE — 87086 URINE CULTURE/COLONY COUNT: CPT | Performed by: UROLOGY

## 2025-03-05 PROCEDURE — 81001 URINALYSIS AUTO W/SCOPE: CPT | Mod: PBBFAC | Performed by: UROLOGY

## 2025-03-05 NOTE — PROGRESS NOTES
"CC:  PSA follow up with urinary frequency and s/p acute cystitis     HPI:  Jude Kaur Jr. is a 59 y.o. male seen for follow-up of elevated PSA.  Patient has been experiencing urinary urgency and urinary frequency especially at night; patient denies urinary retention, dysuria, gross hematuria, abdominal discomfort, flank pain, fevers, penile pain, and testicular pain.  Patient admits to continued fluid intake prior to going to bed.  Patient admits to having trouble initiating his urinary stream.  Compliant taking Flomax 0.8 mg daily.   Patient treated with Levaquin for prior GBS UTI <10,000 colonies. Patient admits to completing this antibiotic in October 2024.      Urinalysis:  Results for orders placed or performed in visit on 03/05/25   POCT URINE DIPSTICK WITH MICROSCOPE, AUTOMATED   Result Value Ref Range    Color, UA Yellow     Spec Grav UA 1.025     pH, UA 6.0     WBC, UA neg     Nitrite, UA neg     Protein, POC neg     Glucose, UA neg     Ketones, UA neg     Urobilinogen, UA 0.2     Bilirubin, POC neg     Blood, UA trace-intact      Microscopic Urinalysis:  WBC:   None per HPF     RBC:    None per HPF     Bacteria:    None per HPF     Squamous epithelial cells:  None per HPF      Crystals:   None    Lab Results:  PSA History:     Latest Reference Range & Units 04/19/21 08:28 10/17/22 09:36 11/02/23 15:41 03/03/25 08:05   Prostate Specific Antigen <=4.00 ng/mL 2.54 3.03 3.16 4.84 (H)       ROS:  All systems reviewed and are negative except as documented in HPI and/or Assessment and Plan.       Past Medical History:  Past Medical History:   Diagnosis Date    BPH (benign prostatic hyperplasia)     No known health problems         Past Surgical History:  Past Surgical History:   Procedure Laterality Date    COLONOSCOPY  05/28/2014    Gonsalo Gonzalez MD    COLONOSCOPY W/ POLYPECTOMY  12/02/2024    INSERTION OF SHUNT      "shunt in head as a baby"        Family History:  Family History   Problem Relation " Name Age of Onset    Diabetes Mother Maria C garcia     Heart disease Mother Maria C garcia     Hypertension Mother Maria C garcia     Diabetes Father Jude garcia sr         Social History:  Social History     Socioeconomic History    Marital status:    Tobacco Use    Smoking status: Never     Passive exposure: Never    Smokeless tobacco: Never   Substance and Sexual Activity    Alcohol use: Never    Drug use: Never    Sexual activity: Yes     Partners: Female     Birth control/protection: None     Social Drivers of Health     Financial Resource Strain: Low Risk  (11/25/2024)    Overall Financial Resource Strain (CARDIA)     Difficulty of Paying Living Expenses: Not hard at all   Food Insecurity: No Food Insecurity (11/25/2024)    Hunger Vital Sign     Worried About Running Out of Food in the Last Year: Never true     Ran Out of Food in the Last Year: Never true   Transportation Needs: No Transportation Needs (11/25/2024)    TRANSPORTATION NEEDS     Transportation : No   Physical Activity: Inactive (11/25/2024)    Exercise Vital Sign     Days of Exercise per Week: 0 days     Minutes of Exercise per Session: 0 min   Stress: No Stress Concern Present (11/25/2024)    [employer] [place] [employer] - Occupational Stress Questionnaire     Feeling of Stress : Not at all   Housing Stability: Unknown (11/25/2024)    Housing Stability Vital Sign     Unable to Pay for Housing in the Last Year: No     Homeless in the Last Year: No        Allergies:  Review of patient's allergies indicates:  No Known Allergies     Objective:  Vitals:    03/05/25 0832   BP: 116/74   Pulse: 69   Resp: 20   Temp: 98 °F (36.7 °C)     General:  Well developed, well nourished adult male in no acute distress  Abdomen: Soft, nontender, no masses  Extremities:  No clubbing, cyanosis, or edema  Neurologic:  Grossly intact  Musculoskeletal:  Normal tone    Assessment:  1. BPH with obstruction/lower urinary tract symptoms  - POCT URINE  DIPSTICK WITH MICROSCOPE, AUTOMATED    2. Elevated PSA    3. Urinary urgency  - PSA, Total (Diagnostic); Future  - Urine Culture High Risk      Plan:  Continue taking Flomax 0.8 mg daily as previously prescribed.  Continued observation.  Rise in PSA noted and discussed with the patient.  PSA to be repeated prior to next appointment given patient's recent UTI.  Consider ordering MRI at next follow up visit if PSA continues to remain elevated or increase further.  Urine to be sent off for urine culture today.  Will call to follow up results.      Follow-up tests needed:   Repeat PSA   Urine culture    Return appointment:  2 months with JAY JAY Funez DO

## 2025-03-05 NOTE — PROGRESS NOTES
Pt seen by HALIMA Pate; Urine collected & sent to lab; Pt instructed to return to clinic in 2 months w/PSA; Discharge paperwork given w/pt verbalizing understanding

## 2025-03-07 LAB — BACTERIA UR CULT: NO GROWTH

## 2025-03-30 ENCOUNTER — OFFICE VISIT (OUTPATIENT)
Dept: URGENT CARE | Facility: CLINIC | Age: 60
End: 2025-03-30
Payer: MEDICARE

## 2025-03-30 VITALS
TEMPERATURE: 98 F | HEART RATE: 63 BPM | OXYGEN SATURATION: 100 % | BODY MASS INDEX: 29.99 KG/M2 | DIASTOLIC BLOOD PRESSURE: 68 MMHG | RESPIRATION RATE: 16 BRPM | WEIGHT: 180 LBS | HEIGHT: 65 IN | SYSTOLIC BLOOD PRESSURE: 115 MMHG

## 2025-03-30 DIAGNOSIS — M54.42 LEFT-SIDED LOW BACK PAIN WITH BILATERAL SCIATICA, UNSPECIFIED CHRONICITY: Primary | ICD-10-CM

## 2025-03-30 DIAGNOSIS — M54.41 LEFT-SIDED LOW BACK PAIN WITH BILATERAL SCIATICA, UNSPECIFIED CHRONICITY: Primary | ICD-10-CM

## 2025-03-30 PROCEDURE — 99214 OFFICE O/P EST MOD 30 MIN: CPT | Mod: PBBFAC | Performed by: NURSE PRACTITIONER

## 2025-03-30 PROCEDURE — 99213 OFFICE O/P EST LOW 20 MIN: CPT | Mod: S$PBB,,, | Performed by: NURSE PRACTITIONER

## 2025-03-30 PROCEDURE — 63600175 PHARM REV CODE 636 W HCPCS: Mod: JZ,TB

## 2025-03-30 RX ORDER — BACLOFEN 10 MG/1
10 TABLET ORAL 3 TIMES DAILY
Qty: 21 TABLET | Refills: 0 | Status: SHIPPED | OUTPATIENT
Start: 2025-03-30 | End: 2025-04-06

## 2025-03-30 RX ORDER — KETOROLAC TROMETHAMINE 30 MG/ML
60 INJECTION, SOLUTION INTRAMUSCULAR; INTRAVENOUS
Status: COMPLETED | OUTPATIENT
Start: 2025-03-30 | End: 2025-03-30

## 2025-03-30 RX ADMIN — KETOROLAC TROMETHAMINE 60 MG: 60 INJECTION, SOLUTION INTRAMUSCULAR at 05:03

## 2025-03-30 NOTE — PATIENT INSTRUCTIONS
Please follow instructions on patient education material.  Return to urgent care in 2 to 3 days if symptoms are not improving, immediately if you develop any new or worsening symptoms.     You received a Toradol injection in clinic - do not take NSAIDs (ibuprofen, naproxen, Advil, Motrin, Aleve) for the next 8 hours.   Please read the attached information about NSAIDs.    Please use the prescriptions that were sent for you.     You got a Toradol shot in clinic today. For the next 8 hours do not take:  Ibuprofen  Naproxen  Advil  Aleve  Motrin  Ketorolac   Diclofenac  Meloxicam     Take Rx medications as prescribed and use only when needed, not on a schedule.    You need to follow up with a PCP to properly treat your ongoing back pain.    Use good body mechanics when working/lifting. Stretch your abdominals, hamstrings, buttocks, and thighs frequently. Drink plenty of water.

## 2025-03-30 NOTE — PROGRESS NOTES
"Subjective:      Patient ID: Jude Kaur Jr. is a 59 y.o. male.    Vitals:  height is 5' 5" (1.651 m) and weight is 81.6 kg (180 lb). His oral temperature is 98.2 °F (36.8 °C). His blood pressure is 115/68 and his pulse is 63. His respiration is 16 and oxygen saturation is 100%.     Chief Complaint: Back Pain (Lower left sided back pain going into his leg x 2 days. Reports taking Advil and epsom salt soaks with relief at night)    Back Pain    As stated in CC. Pt reports lifting heavy boxes in last week, Pt has been taking Advil with some relief of pain. 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  Pt denies fever, cough, chest pain or shortness of breath headache, dizziness, weakness, stomach pain, n/v/d, numbness or tingling in extremities, bowel or bladder incontinence.     Musculoskeletal:  Positive for back pain.      Objective:     Physical Exam   Constitutional: He appears well-developed.  Non-toxic appearance. He does not appear ill. No distress.   HENT:   Head: Atraumatic.   Nose: No purulent discharge. Right sinus exhibits no maxillary sinus tenderness and no frontal sinus tenderness. Left sinus exhibits no maxillary sinus tenderness and no frontal sinus tenderness.   Mouth/Throat: Uvula is midline.   Eyes: Right eye exhibits no discharge. Left eye exhibits no discharge. Extraocular movement intact   Neck: Neck supple. No neck rigidity present.   Cardiovascular: Regular rhythm.   Pulmonary/Chest: Effort normal and breath sounds normal. No respiratory distress. He has no wheezes. He has no rhonchi. He has no rales.   Musculoskeletal: Normal range of motion.         General: Tenderness present. No swelling, deformity or signs of injury. Normal range of motion.      Thoracic back: Normal.      Lumbar back: He exhibits tenderness and spasm. He exhibits normal range of motion and no bony tenderness.        Back:       Right lower leg: No edema.      Left lower leg: No edema.   Lymphadenopathy:     " He has no cervical adenopathy.   Neurological: He is alert.   Skin: Skin is warm, dry and not diaphoretic.   Psychiatric: His behavior is normal.   Nursing note and vitals reviewed.      Assessment:     1. Left-sided low back pain with bilateral sciatica, unspecified chronicity        Plan:   ER Precautions given and discussed  Pt given IM injection at request. Baclofen sent to pharmacy.   Use good body mechanics when working/lifting. Stretch your abdominals, hamstrings, buttocks, and thighs frequently. Drink plenty of water.   Left-sided low back pain with bilateral sciatica, unspecified chronicity    Other orders  -     ketorolac injection 60 mg  -     baclofen (LIORESAL) 10 MG tablet; Take 1 tablet (10 mg total) by mouth 3 (three) times daily. for 7 days  Dispense: 21 tablet; Refill: 0

## 2025-05-07 ENCOUNTER — LAB VISIT (OUTPATIENT)
Dept: LAB | Facility: HOSPITAL | Age: 60
End: 2025-05-07
Attending: NURSE PRACTITIONER
Payer: MEDICARE

## 2025-05-07 ENCOUNTER — PATIENT OUTREACH (OUTPATIENT)
Facility: CLINIC | Age: 60
End: 2025-05-07
Payer: MEDICARE

## 2025-05-07 DIAGNOSIS — E11.9 CONTROLLED TYPE 2 DIABETES MELLITUS WITHOUT COMPLICATION, WITHOUT LONG-TERM CURRENT USE OF INSULIN: ICD-10-CM

## 2025-05-07 DIAGNOSIS — R39.15 URINARY URGENCY: ICD-10-CM

## 2025-05-07 DIAGNOSIS — Z12.5 PROSTATE CANCER SCREENING: ICD-10-CM

## 2025-05-07 LAB
ALBUMIN SERPL-MCNC: 3.7 G/DL (ref 3.4–4.8)
ALBUMIN/GLOB SERPL: 1 RATIO (ref 1.1–2)
ALP SERPL-CCNC: 92 UNIT/L (ref 40–150)
ALT SERPL-CCNC: 37 UNIT/L (ref 0–55)
ANION GAP SERPL CALC-SCNC: 9 MEQ/L
AST SERPL-CCNC: 23 UNIT/L (ref 11–45)
BASOPHILS # BLD AUTO: 0.02 X10(3)/MCL
BASOPHILS NFR BLD AUTO: 0.4 %
BILIRUB SERPL-MCNC: 0.4 MG/DL
BUN SERPL-MCNC: 15.9 MG/DL (ref 8.4–25.7)
CALCIUM SERPL-MCNC: 8.8 MG/DL (ref 8.8–10)
CHLORIDE SERPL-SCNC: 109 MMOL/L (ref 98–107)
CO2 SERPL-SCNC: 25 MMOL/L (ref 23–31)
CREAT SERPL-MCNC: 0.78 MG/DL (ref 0.72–1.25)
CREAT/UREA NIT SERPL: 20
EOSINOPHIL # BLD AUTO: 0.05 X10(3)/MCL (ref 0–0.9)
EOSINOPHIL NFR BLD AUTO: 0.9 %
ERYTHROCYTE [DISTWIDTH] IN BLOOD BY AUTOMATED COUNT: 14.7 % (ref 11.5–17)
EST. AVERAGE GLUCOSE BLD GHB EST-MCNC: 119.8 MG/DL
GFR SERPLBLD CREATININE-BSD FMLA CKD-EPI: >60 ML/MIN/1.73/M2
GLOBULIN SER-MCNC: 3.6 GM/DL (ref 2.4–3.5)
GLUCOSE SERPL-MCNC: 101 MG/DL (ref 82–115)
HBA1C MFR BLD: 5.8 %
HCT VFR BLD AUTO: 39.3 % (ref 42–52)
HGB BLD-MCNC: 12.6 G/DL (ref 14–18)
IMM GRANULOCYTES # BLD AUTO: 0.01 X10(3)/MCL (ref 0–0.04)
IMM GRANULOCYTES NFR BLD AUTO: 0.2 %
LYMPHOCYTES # BLD AUTO: 2.58 X10(3)/MCL (ref 0.6–4.6)
LYMPHOCYTES NFR BLD AUTO: 47.3 %
MCH RBC QN AUTO: 24.4 PG (ref 27–31)
MCHC RBC AUTO-ENTMCNC: 32.1 G/DL (ref 33–36)
MCV RBC AUTO: 76 FL (ref 80–94)
MONOCYTES # BLD AUTO: 0.43 X10(3)/MCL (ref 0.1–1.3)
MONOCYTES NFR BLD AUTO: 7.9 %
NEUTROPHILS # BLD AUTO: 2.36 X10(3)/MCL (ref 2.1–9.2)
NEUTROPHILS NFR BLD AUTO: 43.3 %
NRBC BLD AUTO-RTO: 0 %
PLATELET # BLD AUTO: 246 X10(3)/MCL (ref 130–400)
PMV BLD AUTO: 10.4 FL (ref 7.4–10.4)
POTASSIUM SERPL-SCNC: 4.1 MMOL/L (ref 3.5–5.1)
PROT SERPL-MCNC: 7.3 GM/DL (ref 5.8–7.6)
PSA SERPL-MCNC: 4.33 NG/ML
RBC # BLD AUTO: 5.17 X10(6)/MCL (ref 4.7–6.1)
SODIUM SERPL-SCNC: 143 MMOL/L (ref 136–145)
TSH SERPL-ACNC: 1.74 UIU/ML (ref 0.35–4.94)
WBC # BLD AUTO: 5.45 X10(3)/MCL (ref 4.5–11.5)

## 2025-05-07 PROCEDURE — 85025 COMPLETE CBC W/AUTO DIFF WBC: CPT

## 2025-05-07 PROCEDURE — 80053 COMPREHEN METABOLIC PANEL: CPT

## 2025-05-07 PROCEDURE — 84443 ASSAY THYROID STIM HORMONE: CPT

## 2025-05-07 PROCEDURE — 84153 ASSAY OF PSA TOTAL: CPT

## 2025-05-07 PROCEDURE — 36415 COLL VENOUS BLD VENIPUNCTURE: CPT

## 2025-05-07 PROCEDURE — 83036 HEMOGLOBIN GLYCOSYLATED A1C: CPT

## 2025-05-07 NOTE — LETTER
AUTHORIZATION FOR RELEASE OF   CONFIDENTIAL INFORMATION    Dear Dr Yuan,    We are seeing Jude Kaur Jr., date of birth 1965, in the clinic at [facility]. Pretty Flores FNP is the patient's PCP. Jude Kaur Jr. has an outstanding lab/procedure at the time we reviewed his chart. In order to help keep his health information updated, he has authorized us to request the following medical record(s):                                                  (X) EYE EXAM     Please fax records to [facility], Boudreaux, Tina M., FNP,                                        FAX  [phone]      Patient Name: Jude Kaur Jr.  : 1965  Patient Phone #: [phone]   Jude Kaur Jr.  MRN: [id number]  : 1965  Age: 59 y.o.  Sex: male         Patient/Legal Guardian Signature  This signature was collected at 2025

## 2025-05-07 NOTE — PROGRESS NOTES
Health Maintenance Topic(s) Outreach Outcomes & Actions Taken:    Medication Adherence / Statins - Outreach Outcomes & Actions Taken  : per pcp, pt declines due to Lipids WNL    Eye Exam - Outreach Outcomes & Actions Taken  : External Records Requested & Care Team Updated if Applicable and [facility]- Dr Brennan Yuan     Additional Notes:  Annual Wellness Visit: Due     Next PCP F/U: 5/27/2025  Health Maintenance Topics Overdue:  VBHM Score: 1   Eye Exam- Pt states recent exam. Record request to [employer]       DM: Last A1c 5.9. Diet controlled.    Statin Therapy for prevention of CVD: Declines       Care Management, Digital Medicine, and/or Education Referrals      Next Steps - Referral Actions: Digital Medicine Outcomes and Actions Taken: Needs review

## 2025-05-12 ENCOUNTER — OFFICE VISIT (OUTPATIENT)
Dept: UROLOGY | Facility: CLINIC | Age: 60
End: 2025-05-12
Payer: MEDICARE

## 2025-05-12 VITALS
BODY MASS INDEX: 30.66 KG/M2 | DIASTOLIC BLOOD PRESSURE: 72 MMHG | TEMPERATURE: 98 F | WEIGHT: 184 LBS | HEART RATE: 53 BPM | SYSTOLIC BLOOD PRESSURE: 118 MMHG | HEIGHT: 65 IN | RESPIRATION RATE: 20 BRPM | OXYGEN SATURATION: 97 %

## 2025-05-12 DIAGNOSIS — N13.8 BPH WITH OBSTRUCTION/LOWER URINARY TRACT SYMPTOMS: Primary | ICD-10-CM

## 2025-05-12 DIAGNOSIS — N40.1 BPH WITH OBSTRUCTION/LOWER URINARY TRACT SYMPTOMS: Primary | ICD-10-CM

## 2025-05-12 DIAGNOSIS — N52.8 OTHER MALE ERECTILE DYSFUNCTION: ICD-10-CM

## 2025-05-12 DIAGNOSIS — R97.20 ELEVATED PSA: ICD-10-CM

## 2025-05-12 PROCEDURE — 81001 URINALYSIS AUTO W/SCOPE: CPT | Mod: PBBFAC | Performed by: UROLOGY

## 2025-05-12 PROCEDURE — 99214 OFFICE O/P EST MOD 30 MIN: CPT | Mod: PBBFAC | Performed by: UROLOGY

## 2025-05-12 PROCEDURE — 99214 OFFICE O/P EST MOD 30 MIN: CPT | Mod: S$PBB,,, | Performed by: UROLOGY

## 2025-05-12 RX ORDER — TAMSULOSIN HYDROCHLORIDE 0.4 MG/1
0.8 CAPSULE ORAL DAILY
Qty: 180 CAPSULE | Refills: 3 | Status: SHIPPED | OUTPATIENT
Start: 2025-05-12

## 2025-05-12 RX ORDER — TADALAFIL 5 MG/1
5 TABLET ORAL DAILY PRN
Qty: 90 TABLET | Refills: 3 | Status: SHIPPED | OUTPATIENT
Start: 2025-05-12 | End: 2026-05-12

## 2025-05-12 RX ORDER — TAMSULOSIN HYDROCHLORIDE 0.4 MG/1
0.8 CAPSULE ORAL DAILY
Qty: 90 CAPSULE | Refills: 3 | Status: SHIPPED | OUTPATIENT
Start: 2025-05-12 | End: 2025-05-12

## 2025-05-12 NOTE — PROGRESS NOTES
"CC:  Six month    HPI:  Jude Kaur Jr. is a 59 y.o. male seen for follow-up.  He has a history of urinary retention and is taking tamsulosin 0.8 mg in the morning.    He complains of erectile dysfunction.  He was given tadalafil but hasn't filled the prescription. He is wondering about taking this daily for help with urination as well as erectile dysfunction.    His PSA was a little elevated at the last visit.  He did have a repeat for today's visit.    Urinalysis:  Results for orders placed or performed in visit on 05/12/25   POCT URINE DIPSTICK WITH MICROSCOPE, AUTOMATED   Result Value Ref Range    Color, UA Yellow     Spec Grav UA 1.020     pH, UA 6.0     WBC, UA neg     Nitrite, UA neg     Protein, POC neg     Glucose, UA neg     Ketones, UA neg     Urobilinogen, UA 0.2     Bilirubin, POC neg     Blood, UA trace-intact      Microscopic Urinalysis:  WBC:   None per HPF     RBC:    0-1 per HPF     Bacteria:    None per HPF     Squamous epithelial cells:  None per HPF      Crystals:   None    Lab Results:  PSA History:    05/18/17 10:50 06/18/18 09:25 07/17/19 10:05 07/15/20 09:39 04/19/21 08:28 10/17/22 09:36 11/02/23 15:41 03/03/25 08:05 05/07/25 10:03   1.5 1.8 2.3 2.9 2.54 3.03 3.16 4.84 (H) 4.33 (H)     Recent Labs     05/07/25  1003   CREATININE 0.78       ROS:  All systems reviewed and are negative except as documented in HPI and/or Assessment and Plan.     Patient Active Problem List:     Problem List[1]     Past Medical History:  Past Medical History:   Diagnosis Date    BPH (benign prostatic hyperplasia)     No known health problems         Past Surgical History:  Past Surgical History:   Procedure Laterality Date    COLONOSCOPY  05/28/2014    Gonsalo Gonzalez MD    COLONOSCOPY W/ POLYPECTOMY  12/02/2024    INSERTION OF SHUNT      "shunt in head as a baby"        Family History:  Family History   Problem Relation Name Age of Onset    Diabetes Mother Maria C kaur     Heart disease Mother Maria C " jose     Hypertension Mother Maria C garcia     Diabetes Father Jude garcia sr         Social History:  Social History     Socioeconomic History    Marital status:    Tobacco Use    Smoking status: Never     Passive exposure: Never    Smokeless tobacco: Never   Substance and Sexual Activity    Alcohol use: Never    Drug use: Never    Sexual activity: Yes     Partners: Female     Birth control/protection: None     Social Drivers of Health     Financial Resource Strain: Low Risk  (11/25/2024)    Overall Financial Resource Strain (CARDIA)     Difficulty of Paying Living Expenses: Not hard at all   Food Insecurity: No Food Insecurity (11/25/2024)    Hunger Vital Sign     Worried About Running Out of Food in the Last Year: Never true     Ran Out of Food in the Last Year: Never true   Transportation Needs: No Transportation Needs (11/25/2024)    TRANSPORTATION NEEDS     Transportation : No   Physical Activity: Inactive (11/25/2024)    Exercise Vital Sign     Days of Exercise per Week: 0 days     Minutes of Exercise per Session: 0 min   Stress: No Stress Concern Present (11/25/2024)    [employer] [place] [employer] - Occupational Stress Questionnaire     Feeling of Stress : Not at all   Housing Stability: Unknown (11/25/2024)    Housing Stability Vital Sign     Unable to Pay for Housing in the Last Year: No     Homeless in the Last Year: No        Allergies:  Review of patient's allergies indicates:  No Known Allergies     Objective:  Vitals:    05/12/25 0836   BP: 118/72   Pulse: (!) 53   Resp: 20   Temp: 97.7 °F (36.5 °C)     General:  Well developed, well nourished adult male in no acute distress  Abdomen: Soft, nontender, no masses  Extremities:  No clubbing, cyanosis, or edema  Neurologic:  Grossly intact  Musculoskeletal:  Normal tone    Assessment:  1. BPH with obstruction/lower urinary tract symptoms  - POCT URINE DIPSTICK WITH MICROSCOPE, AUTOMATED  - tamsulosin (FLOMAX) 0.4 mg Cap; Take  2 capsules (0.8 mg total) by mouth once daily.  Dispense: 180 capsule; Refill: 3    2. Elevated PSA  - MRI Prostate W W/O Contrast; Future    3. Other male erectile dysfunction  - tadalafiL (CIALIS) 5 MG tablet; Take 1 tablet (5 mg total) by mouth daily as needed for Erectile Dysfunction.  Dispense: 90 tablet; Refill: 3    Plan:  Continue tamsulosin 0.8 mg.  Refill given today.  I am going to get an MRI of the prostate to help with the decision on the need for a prostate biopsy.  He was given Tadalafil to take 5 mg daily.  Should help with the urinary complaints as well as erectile dysfunction.    Follow-up tests needed:   MRI of the prostate.      Return appointment:  After above study.                   [1]   Patient Active Problem List  Diagnosis    Anemia    Controlled type 2 diabetes mellitus without complication, without long-term current use of insulin    Hematuria    History of fall    Hydrocephalus    Loss of balance    Obesity    Pain in head     (ventriculoperitoneal) shunt status    Well adult exam    Prostate cancer screening    COVID    Acute left-sided low back pain with left-sided sciatica    Convulsions, unspecified convulsion type

## 2025-05-27 ENCOUNTER — OFFICE VISIT (OUTPATIENT)
Dept: INTERNAL MEDICINE | Facility: CLINIC | Age: 60
End: 2025-05-27
Payer: MEDICARE

## 2025-05-27 VITALS
TEMPERATURE: 98 F | HEART RATE: 70 BPM | BODY MASS INDEX: 30.99 KG/M2 | RESPIRATION RATE: 18 BRPM | SYSTOLIC BLOOD PRESSURE: 111 MMHG | WEIGHT: 186 LBS | HEIGHT: 65 IN | DIASTOLIC BLOOD PRESSURE: 73 MMHG

## 2025-05-27 DIAGNOSIS — G91.8 OTHER HYDROCEPHALUS: Primary | ICD-10-CM

## 2025-05-27 DIAGNOSIS — E11.9 CONTROLLED TYPE 2 DIABETES MELLITUS WITHOUT COMPLICATION, WITHOUT LONG-TERM CURRENT USE OF INSULIN: ICD-10-CM

## 2025-05-27 DIAGNOSIS — R79.89 ABNORMAL CBC: Primary | ICD-10-CM

## 2025-05-27 PROCEDURE — 99213 OFFICE O/P EST LOW 20 MIN: CPT | Mod: S$PBB,,, | Performed by: NURSE PRACTITIONER

## 2025-05-27 PROCEDURE — 99213 OFFICE O/P EST LOW 20 MIN: CPT | Mod: PBBFAC | Performed by: NURSE PRACTITIONER

## 2025-05-27 NOTE — PROGRESS NOTES
"   Patient ID: [id number]     Chief Complaint: Results        HPI:     HPI      Jude Kaur Jr. is a 59 y.o. male here today for a follow up. Pt followed in Uro cl for elevated PSA. Pt has hx Anemia, DM2, history of fall, Elevated PSA.         Immunizations:   Immunization History   Administered Date(s) Administered    COVID-19, MRNA, LN-S, PF (Pfizer) (Purple Cap) 07/24/2021, 08/14/2021        -------------------------------------    BPH (benign prostatic hyperplasia)    No known health problems        Past Surgical History:   Procedure Laterality Date    COLONOSCOPY  05/28/2014    Gonsalo Gonzalez MD    COLONOSCOPY W/ POLYPECTOMY  12/02/2024    INSERTION OF SHUNT      "shunt in head as a baby"       Review of patient's allergies indicates:  No Known Allergies    Current Outpatient Medications   Medication Instructions    baclofen (LIORESAL) 10 mg, Oral, 3 times daily    tadalafiL (CIALIS) 5 mg, Oral, Daily PRN    tamsulosin (FLOMAX) 0.8 mg, Oral, Daily       Social History[1]     Family History   Problem Relation Name Age of Onset    Diabetes Mother Maria C kaur     Heart disease Mother Maria C kaur     Hypertension Mother Maria C kaur     Diabetes Father Jude kaur sr         Patient Care Team:  Pretty Flores FNP as PCP - General (Family Medicine)  Yary House LPN as Care Coordinator  Brennan Yuan OD (Optometry)     Subjective:     Review of Systems     See HPI for details    Constitutional: Denies Change in appetite. Denies Chills. Denies Fever. Denies Night sweats.  Eye: Denies Blurred vision. Denies Discharge. Denies Eye pain.  ENT: Denies Decreased hearing. Denies Sore throat. Denies Swollen glands.  Respiratory: Denies Cough. Denies Shortness of breath. Denies Shortness of breath with exertion. Denies Wheezing.  Cardiovascular: DeniesChest pain at rest. Denies Chest pain with exertion. Denies Irregular heartbeat. Denies Palpitations. Denies Edema.  Gastrointestinal: Denies " "Abdominal pain. DeniesDiarrhea. Denies Nausea. Denies Vomiting. Denies Hematemesis or Hematochezia.  Genitourinary: Denies Dysuria. Denies Urinary frequency. Denies Urinary urgency. Denies Blood in urine.  Endocrine: Denies Cold intolerance. Denies Excessive thirst. Denies Heat intolerance. Denies Weight loss. Denies Weight gain.  Musculoskeletal: Denies Painful joints. Denies Weakness.  Integumentary: Denies Rash. Denies Itching. Denies Dry skin.  Neurologic: Denies Dizziness. Denies Fainting. Denies Headache.  Psychiatric: Denies Depression. Denies Anxiety. Denies Suicidal/Homicidal ideations.    All Other ROS: Negative except as stated in HPI.       Objective:     Visit Vitals  /73   Pulse 70   Temp 98 °F (36.7 °C) (Oral)   Resp 18   Ht 5' 5" (1.651 m)   Wt 84.4 kg (186 lb)   BMI 30.95 kg/m²       Physical Exam    General: Alert and oriented, No acute distress.  Head: Normocephalic, Atraumatic.  Eye: Pupils are equal, round and reactive to light, Extraocular movements are intact, Sclera non-icteric.  Ears/Nose/Throat: Normal, Mucosa moist,Clear.  Neck/Thyroid: Supple, Non-tender, No carotid bruit, No lymphadenopathy, No JVD, Full range of motion.  Respiratory: Clear to auscultation bilaterally; No wheezes, rales or rhonchi,Non-labored respirations, Symmetrical chest wall expansion.  Cardiovascular: Regular rate and rhythm, S1/S2 normal, No murmurs, rubs or gallops.  Gastrointestinal: Soft, Non-tender, Non-distended, Normal bowel sounds, No palpable organomegaly.  Musculoskeletal: Normal range of motion.  Integumentary: Warm, Dry, Intact, No suspicious lesions or rashes.  Extremities: No clubbing, cyanosis or edema  Neurologic: No focal deficits, Cranial Nerves II-XII are grossly intact, Motor strength normal upper and lower extremities, Sensory exam intact.  Psychiatric: Normal interaction, Coherent speech, Euthymic mood, Appropriate affect       Labs Reviewed:     Chemistry:  Lab Results   Component Value " Date     05/07/2025    K 4.1 05/07/2025    BUN 15.9 05/07/2025    CREATININE 0.78 05/07/2025    EGFRNORACEVR >60 05/07/2025    CALCIUM 8.8 05/07/2025    ALKPHOS 92 05/07/2025    ALBUMIN 3.7 05/07/2025    BILIDIR 0.2 03/31/2022    IBILI 0.20 03/31/2022    AST 23 05/07/2025    ALT 37 05/07/2025    MG 1.90 10/11/2023        Lab Results   Component Value Date    HGBA1C 5.8 05/07/2025        Hematology:  Lab Results   Component Value Date    WBC 5.45 05/07/2025    HGB 12.6 (L) 05/07/2025    HCT 39.3 (L) 05/07/2025     05/07/2025       Lipid Panel:  Lab Results   Component Value Date    CHOL 150 11/18/2024    HDL 36 11/18/2024    LDL 96.00 11/18/2024    TRIG 89 11/18/2024    TOTALCHOLEST 4 11/18/2024        Urine:  Lab Results   Component Value Date    APPEARANCEUA Cloudy (A) 05/18/2024    SGUA 1.020 05/18/2024    PROTEINUA 1+ (A) 05/18/2024    KETONESUA Trace (A) 05/18/2024    LEUKOCYTESUR 250 (A) 05/18/2024    RBCUA >100 (A) 05/18/2024    WBCUA 11-20 (A) 05/18/2024    BACTERIA Rare 05/18/2024    SQEPUA Trace 05/18/2024    HYALINECASTS None Seen 05/18/2024    CREATRANDUR 164.6 11/18/2024        Assessment:       ICD-10-CM ICD-9-CM   1. Abnormal CBC  R79.89 790.6   2. Controlled type 2 diabetes mellitus without complication, without long-term current use of insulin  E11.9 250.00        Plan:     1. Abnormal CBC (Primary)  CBC stable. CBC in 6 months.     2. Controlled type 2 diabetes mellitus without complication, without long-term current use of insulin  A1c 5.8. ADA diet and exercise. Controlled with diet and exercise.   Urine microalbumin  11-18-24. DM foot 9-27-24. DM eye 3-3-25.       Follow up in about 6 months (around 11/27/2025) for with labs 1 week prior to appt. . In addition to their scheduled follow up, the patient has also been instructed to follow up on as needed basis.     Future Appointments   Date Time Provider Department Center   6/26/2025  9:15 AM Andreea Flores DO [facility]         GULSHAN Butcher             [1]   Social History  Socioeconomic History    Marital status:    Tobacco Use    Smoking status: Never     Passive exposure: Never    Smokeless tobacco: Never   Substance and Sexual Activity    Alcohol use: Never    Drug use: Never    Sexual activity: Yes     Partners: Female     Birth control/protection: None     Social Drivers of Health     Financial Resource Strain: Low Risk  (11/25/2024)    Overall Financial Resource Strain (CARDIA)     Difficulty of Paying Living Expenses: Not hard at all   Food Insecurity: No Food Insecurity (11/25/2024)    Hunger Vital Sign     Worried About Running Out of Food in the Last Year: Never true     Ran Out of Food in the Last Year: Never true   Transportation Needs: No Transportation Needs (11/25/2024)    TRANSPORTATION NEEDS     Transportation : No   Physical Activity: Inactive (11/25/2024)    Exercise Vital Sign     Days of Exercise per Week: 0 days     Minutes of Exercise per Session: 0 min   Stress: No Stress Concern Present (11/25/2024)    [employer] [place] [employer] - Occupational Stress Questionnaire     Feeling of Stress : Not at all   Housing Stability: Unknown (11/25/2024)    Housing Stability Vital Sign     Unable to Pay for Housing in the Last Year: No     Homeless in the Last Year: No

## 2025-06-13 ENCOUNTER — HOSPITAL ENCOUNTER (OUTPATIENT)
Dept: RADIOLOGY | Facility: HOSPITAL | Age: 60
Discharge: HOME | End: 2025-06-13
Attending: UROLOGY
Payer: MEDICARE

## 2025-06-13 DIAGNOSIS — G91.8 OTHER HYDROCEPHALUS: ICD-10-CM

## 2025-06-13 DIAGNOSIS — R97.20 ELEVATED PSA: ICD-10-CM

## 2025-06-26 ENCOUNTER — HOSPITAL ENCOUNTER (OUTPATIENT)
Dept: RADIOLOGY | Facility: HOSPITAL | Age: 60
Discharge: HOME | End: 2025-06-26
Attending: UROLOGY
Payer: MEDICARE

## 2025-06-26 PROCEDURE — 72197 MRI PELVIS W/O & W/DYE: CPT | Mod: TC

## 2025-06-26 PROCEDURE — 25500020 PHARM REV CODE 255

## 2025-06-26 PROCEDURE — A9577 INJ MULTIHANCE: HCPCS

## 2025-06-26 RX ADMIN — GADOBENATE DIMEGLUMINE 18 ML: 529 INJECTION, SOLUTION INTRAVENOUS at 07:06

## 2025-07-07 NOTE — PROGRESS NOTES
Placed in room. To be seen by Dr. Flores. Bladder scan done. 116 ml of residual urine noted.      yes

## 2025-07-10 ENCOUNTER — OFFICE VISIT (OUTPATIENT)
Dept: UROLOGY | Facility: CLINIC | Age: 60
End: 2025-07-10
Payer: MEDICARE

## 2025-07-10 VITALS
WEIGHT: 191.63 LBS | HEIGHT: 65 IN | DIASTOLIC BLOOD PRESSURE: 77 MMHG | OXYGEN SATURATION: 98 % | RESPIRATION RATE: 20 BRPM | TEMPERATURE: 98 F | HEART RATE: 59 BPM | SYSTOLIC BLOOD PRESSURE: 127 MMHG | BODY MASS INDEX: 31.93 KG/M2

## 2025-07-10 DIAGNOSIS — N40.1 BPH WITH OBSTRUCTION/LOWER URINARY TRACT SYMPTOMS: ICD-10-CM

## 2025-07-10 DIAGNOSIS — N13.8 BPH WITH OBSTRUCTION/LOWER URINARY TRACT SYMPTOMS: ICD-10-CM

## 2025-07-10 DIAGNOSIS — R97.20 ELEVATED PSA: Primary | ICD-10-CM

## 2025-07-10 DIAGNOSIS — N52.8 OTHER MALE ERECTILE DYSFUNCTION: ICD-10-CM

## 2025-07-10 LAB
BILIRUB SERPL-MCNC: NEGATIVE MG/DL
BLOOD URINE, POC: NORMAL
COLOR, POC UA: YELLOW
GLUCOSE UR QL STRIP: NEGATIVE
KETONES UR QL STRIP: NEGATIVE
LEUKOCYTE ESTERASE URINE, POC: NEGATIVE
NITRITE, POC UA: NEGATIVE
PH, POC UA: 5.5
PROTEIN, POC: NEGATIVE
SPECIFIC GRAVITY, POC UA: 1.02
UROBILINOGEN, POC UA: 0.2

## 2025-07-10 PROCEDURE — 81001 URINALYSIS AUTO W/SCOPE: CPT | Mod: PBBFAC | Performed by: UROLOGY

## 2025-07-10 PROCEDURE — 99214 OFFICE O/P EST MOD 30 MIN: CPT | Mod: PBBFAC | Performed by: UROLOGY

## 2025-07-10 PROCEDURE — 99214 OFFICE O/P EST MOD 30 MIN: CPT | Mod: S$PBB,,, | Performed by: UROLOGY

## 2025-07-10 RX ORDER — FINASTERIDE 5 MG/1
5 TABLET, FILM COATED ORAL DAILY
Qty: 90 TABLET | Refills: 3 | Status: SHIPPED | OUTPATIENT
Start: 2025-07-10 | End: 2026-07-10

## 2025-07-10 NOTE — PROGRESS NOTES
CC:  MRI results    HPI:  Jude Kaur Jr. is a 59 y.o. male seen for follow-up of MRI.  His PSA was a little elevated to 4.84 in March 2025.  A repeat continued to be elevated to 4.33 so an MRI was obtained.   He has a history of urinary retention and is taking tamsulosin 0.8 mg in the morning.  He continues to have urinary hesitancy, slow stream, and increased nocturia.  He complains of erectile dysfunction.  He was given tadalafil but didn't fill the prescription.     Urinalysis  Results for orders placed or performed in visit on 07/10/25   POCT URINE DIPSTICK WITH MICROSCOPE, AUTOMATED   Result Value Ref Range    Color, UA Yellow     Spec Grav UA 1.025     pH, UA 5.5     WBC, UA Negative     Nitrite, UA Negative     Protein, POC Negative     Glucose, UA Negative     Ketones, UA Negative     Urobilinogen, UA 0.2     Bilirubin, POC Negative     Blood, UA Trace-intact      Microscopic Urinalysis:  WBC:   None per HPF     RBC:    0-1 per HPF     Bacteria:    None per HPF     Squamous epithelial cells:  None per HPF      Crystals:   None    Lab Results:  PSA History:    05/18/17 10:50 06/18/18 09:25 07/17/19 10:05 07/15/20 09:39 04/19/21 08:28 10/17/22 09:36 11/02/23 15:41 03/03/25 08:05 05/07/25 10:03   1.5 1.8 2.3 2.9 2.54 3.03 3.16 4.84 (H) 4.33 (H)       Imaging:  MRI Prostate - 26 June 2025:  Prostate volume estimate:  50 cc  Lesion 1:  A 10 mm crescentic nodule is present to the left transitional zone between the mid gland and the apex and demonstrates subtle restricted diffusion. Multi parametric characteristics are minimally concerning for malignancy (PI-RADS 2/5).   Capsule:  Intact  Lymph Nodes:  No lymphadenopathy  Seminal vesicles:  Unremarkable      ROS:  All systems reviewed and are negative except as documented in HPI and/or Assessment and Plan.     Patient Active Problem List:     Problem List[1]     Past Medical History:  Past Medical History:   Diagnosis Date    BPH (benign prostatic hyperplasia)  "    No known health problems         Past Surgical History:  Past Surgical History:   Procedure Laterality Date    COLONOSCOPY  05/28/2014    Gonsalo Gonzalez MD    COLONOSCOPY W/ POLYPECTOMY  12/02/2024    INSERTION OF SHUNT      "shunt in head as a baby"        Family History:  Family History   Problem Relation Name Age of Onset    Diabetes Mother Maria C garcia     Heart disease Mother Maria C garcia     Hypertension Mother Maria C garcia     Diabetes Father Jude garcia sr         Social History:  Social History     Socioeconomic History    Marital status:    Tobacco Use    Smoking status: Never     Passive exposure: Never    Smokeless tobacco: Never   Substance and Sexual Activity    Alcohol use: Never    Drug use: Never    Sexual activity: Yes     Partners: Female     Birth control/protection: None     Social Drivers of Health     Financial Resource Strain: Low Risk  (11/25/2024)    Overall Financial Resource Strain (CARDIA)     Difficulty of Paying Living Expenses: Not hard at all   Food Insecurity: No Food Insecurity (11/25/2024)    Hunger Vital Sign     Worried About Running Out of Food in the Last Year: Never true     Ran Out of Food in the Last Year: Never true   Transportation Needs: No Transportation Needs (11/25/2024)    TRANSPORTATION NEEDS     Transportation : No   Physical Activity: Inactive (11/25/2024)    Exercise Vital Sign     Days of Exercise per Week: 0 days     Minutes of Exercise per Session: 0 min   Stress: No Stress Concern Present (11/25/2024)    [employer] [place] [employer] - Occupational Stress Questionnaire     Feeling of Stress : Not at all   Housing Stability: Unknown (11/25/2024)    Housing Stability Vital Sign     Unable to Pay for Housing in the Last Year: No     Homeless in the Last Year: No        Allergies:  Review of patient's allergies indicates:  No Known Allergies     Objective:  Vitals:    07/10/25 0921   BP: 127/77   Pulse: (!) 59   Resp: 20   Temp: " 98.2 °F (36.8 °C)     General:  Well developed, well nourished adult male in no acute distress  Abdomen: Soft, nontender, no masses  Extremities:  No clubbing, cyanosis, or edema  Neurologic:  Grossly intact  Musculoskeletal:  Normal tone    Assessment:  1. Elevated PSA  - PSA, Total (Diagnostic); Future    2. BPH with obstruction/lower urinary tract symptoms  - POCT URINE DIPSTICK WITH MICROSCOPE, AUTOMATED  - finasteride (PROSCAR) 5 mg tablet; Take 1 tablet (5 mg total) by mouth once daily.  Dispense: 90 tablet; Refill: 3    3. Other male erectile dysfunction    Plan:  The MRI shows only a PI-RADS 2/5 lesion so biopsy is not indicated at this time.  Continue to watch the PSA with a repeat in November 2025.  Continue tamsulosin 0.8 mg.  He was given finasteride to add to the tamsulosin.  He is not currently taking Tadalafil.    Follow-up tests needed:   PSA in November.     Return appointment:  November with above lab.                     [1]   Patient Active Problem List  Diagnosis    Anemia    Controlled type 2 diabetes mellitus without complication, without long-term current use of insulin    Hematuria    History of fall    Hydrocephalus    Loss of balance    Obesity    Pain in head     (ventriculoperitoneal) shunt status    Well adult exam    Prostate cancer screening    COVID    Acute left-sided low back pain with left-sided sciatica    Convulsions, unspecified convulsion type

## 2025-07-31 ENCOUNTER — CLINICAL SUPPORT (OUTPATIENT)
Dept: UROLOGY | Facility: CLINIC | Age: 60
End: 2025-07-31
Payer: MEDICARE

## 2025-07-31 DIAGNOSIS — R30.0 DYSURIA: Primary | ICD-10-CM

## 2025-07-31 PROCEDURE — 99211 OFF/OP EST MAY X REQ PHY/QHP: CPT | Mod: PBBFAC

## 2025-07-31 PROCEDURE — 81001 URINALYSIS AUTO W/SCOPE: CPT | Mod: PBBFAC

## 2025-07-31 PROCEDURE — 87086 URINE CULTURE/COLONY COUNT: CPT

## 2025-07-31 NOTE — PROGRESS NOTES
He came in today complaining of dysuria and urinary frequency.  A urine was collected.    Results for orders placed or performed in visit on 07/31/25   POCT URINE DIPSTICK WITH MICROSCOPE, AUTOMATED   Result Value Ref Range    Color, UA Yellow     Spec Grav UA 1.025     pH, UA 5.5     WBC, UA Negative     Nitrite, UA Negative     Protein, POC Negative     Glucose, UA Negative     Ketones, UA Negative     Urobilinogen, UA 0.2     Bilirubin, POC Negative     Blood, UA Trace-intact          Microscopic Urinalysis:  WBC:   2-3 per HPF     RBC:    3-4 per HPF     Bacteria:    Few per HPF     Squamous epithelial cells:  None per HPF  Crystals:   None      Urine culture was sent.

## 2025-07-31 NOTE — PROGRESS NOTES
Patient presented to clinic today for nurse visit for UA due to dysuria and urinary frequency.  Informed pt that he would receive a call concerning any abnormality and notification of any prescribed medicine after UA completed.  Pharmacy and contact information confirmed with patient.

## 2025-08-02 LAB — BACTERIA UR CULT: NO GROWTH

## 2025-08-23 ENCOUNTER — PATIENT MESSAGE (OUTPATIENT)
Dept: RESEARCH | Facility: HOSPITAL | Age: 60
End: 2025-08-23
Payer: MEDICARE